# Patient Record
Sex: MALE | Race: WHITE | NOT HISPANIC OR LATINO | Employment: OTHER | URBAN - METROPOLITAN AREA
[De-identification: names, ages, dates, MRNs, and addresses within clinical notes are randomized per-mention and may not be internally consistent; named-entity substitution may affect disease eponyms.]

---

## 2019-02-04 ENCOUNTER — OFFICE VISIT (OUTPATIENT)
Dept: ORTHOPEDIC SURGERY | Facility: CLINIC | Age: 74
End: 2019-02-04

## 2019-02-04 VITALS — TEMPERATURE: 98.6 F | HEIGHT: 75 IN | BODY MASS INDEX: 30.44 KG/M2 | WEIGHT: 244.8 LBS

## 2019-02-04 DIAGNOSIS — M17.11 ARTHRITIS OF RIGHT KNEE: ICD-10-CM

## 2019-02-04 DIAGNOSIS — S89.91XA RIGHT KNEE INJURY, INITIAL ENCOUNTER: Primary | ICD-10-CM

## 2019-02-04 DIAGNOSIS — M25.561 ACUTE PAIN OF RIGHT KNEE: ICD-10-CM

## 2019-02-04 PROCEDURE — 73562 X-RAY EXAM OF KNEE 3: CPT | Performed by: ORTHOPAEDIC SURGERY

## 2019-02-04 PROCEDURE — 99203 OFFICE O/P NEW LOW 30 MIN: CPT | Performed by: ORTHOPAEDIC SURGERY

## 2019-02-04 RX ORDER — AMLODIPINE BESYLATE 5 MG/1
TABLET ORAL
COMMUNITY
Start: 2019-01-22 | End: 2019-08-14 | Stop reason: SDUPTHER

## 2019-02-04 RX ORDER — ATORVASTATIN CALCIUM 20 MG/1
TABLET, FILM COATED ORAL
COMMUNITY
Start: 2019-01-22 | End: 2019-08-14 | Stop reason: SDUPTHER

## 2019-02-04 RX ORDER — LIFITEGRAST 50 MG/ML
SOLUTION/ DROPS OPHTHALMIC
COMMUNITY
Start: 2019-01-25 | End: 2019-05-30

## 2019-02-04 NOTE — PROGRESS NOTES
Patient Name: Gary Grullon   YOB: 1945  Referring Primary Care Physician: Matthew Gomez MD  BMI: Body mass index is 31.01 kg/m².    Chief Complaint:    Chief Complaint   Patient presents with   • Right Knee - Establish Care, Pain        Subjective:    HPI:   Gary Grullon is a pleasant 73 y.o. year old who presents today for evaluation of   Chief Complaint   Patient presents with   • Right Knee - Establish Care, Pain    (Location). Duration: This has been going on for 1 week Timing: The pain is acute. Context or causative factors: unknown, playing tennis and it hurt- got sever that night interfering with his sleep.  improving.  Relieving Factors:  In the past has tried ice and advil.     This problem is new to this examiner.     Medications:   Home Medications:  Current Outpatient Medications on File Prior to Visit   Medication Sig   • amLODIPine (NORVASC) 5 MG tablet    • atorvastatin (LIPITOR) 20 MG tablet    • XIIDRA 5 % solution    • [DISCONTINUED] azithromycin (ZITHROMAX Z-TEVIN) 250 MG tablet Take 2 tablets the first day, then 1 tablet daily for 4 days.   • [DISCONTINUED] azithromycin (ZITHROMAX Z-TEVIN) 250 MG tablet Take 2 tablets the first day, then 1 tablet daily for 4 days.   • [DISCONTINUED] benzonatate (TESSALON) 200 MG capsule Take 1 capsule by mouth 3 (Three) Times a Day As Needed for Cough.   • [DISCONTINUED] benzonatate (TESSALON) 200 MG capsule Take 1 capsule by mouth 3 (Three) Times a Day As Needed for Cough.   • [DISCONTINUED] doxycycline (VIBRAMYCIN) 100 MG capsule 1 po bid   • [DISCONTINUED] tobramycin 0.3 % solution ophthalmic solution Use 2 drops in affected eye every two hours the first day then 2 drops 4 x's a day for 6 days.     No current facility-administered medications on file prior to visit.      Current Medications:  Scheduled Meds:  Continuous Infusions:  No current facility-administered medications for this visit.   PRN Meds:.    I have reviewed the patient's  medical history in detail and updated the computerized patient record.  Review and summarization of old records includes:    Past Medical History:   Diagnosis Date   • Fracture of elbow    • Fracture, finger     left; pinky   • Hyperlipidemia    • Hypertension         Past Surgical History:   Procedure Laterality Date   • CYST REMOVAL  1970        Social History     Occupational History   • Not on file   Tobacco Use   • Smoking status: Never Smoker   • Smokeless tobacco: Never Used   Substance and Sexual Activity   • Alcohol use: Yes     Comment: SOCIALLY   • Drug use: No   • Sexual activity: Defer    Social History     Social History Narrative   • Not on file        Family History   Problem Relation Age of Onset   • No Known Problems Mother    • Hypertension Father    • Diabetes Father    • Stroke Father    • Hypertension Sister    • Diabetes Sister    • Hypertension Brother    • Diabetes Brother    • No Known Problems Maternal Aunt    • No Known Problems Maternal Uncle    • No Known Problems Paternal Aunt    • No Known Problems Paternal Uncle    • No Known Problems Maternal Grandmother    • No Known Problems Maternal Grandfather    • No Known Problems Paternal Grandmother    • No Known Problems Paternal Grandfather    • Anesthesia problems Neg Hx    • Broken bones Neg Hx    • Cancer Neg Hx    • Clotting disorder Neg Hx    • Collagen disease Neg Hx    • Dislocations Neg Hx    • Osteoporosis Neg Hx    • Rheumatologic disease Neg Hx    • Scoliosis Neg Hx    • Severe sprains Neg Hx        ROS: 14 point review of systems was performed and all other systems were reviewed and are negative except for documented findings in HPI and today's encounter.     Allergies: No Known Allergies  Constitutional:  Denies fever, shaking or chills   Eyes:  Denies change in visual acuity   HENT:  Denies nasal congestion or sore throat   Respiratory:  Denies cough or shortness of breath   Cardiovascular:  Denies chest pain or severe LE  "edema   GI:  Denies abdominal pain, nausea, vomiting, bloody stools or diarrhea   Musculoskeletal:  Numbness, tingling, pain, or loss of motor function only as noted above in history of present illness.  : Denies painful urination or hematuria  Integument:  Denies rash, lesion or ulceration   Neurologic:  Denies headache or focal weakness  Endocrine:  Denies lymphadenopathy  Psych:  Denies confusion or change in mental status   Hem:  Denies active bleeding    Subjective     Objective:    Physical Exam: 73 y.o. male  Wt Readings from Last 3 Encounters:   02/04/19 111 kg (244 lb 12.8 oz)   11/11/17 103 kg (228 lb)   04/05/17 102 kg (225 lb)     Ht Readings from Last 3 Encounters:   02/04/19 189.2 cm (74.5\")   11/11/17 189.2 cm (74.5\")   04/05/17 189.2 cm (74.5\")     Body mass index is 31.01 kg/m².    Vitals:    02/04/19 0749   Temp: 98.6 °F (37 °C)       Vital signs reviewed.   General Appearance:    Alert, cooperative, in no acute distress                  Eyes: conjunctiva clear  ENT: external ears and nose atraumatic  CV: no peripheral edema  Resp: normal respiratory effort  Skin: no rashes or wounds; normal turgor  Psych: mood and affect appropriate  Lymph: no nodes appreciated  Neuro: gross sensation intact  Vascular:  Palpable peripheral pulse in noted extremity  Musculoskeletal Extremities: min joint line tenderness. mcm neg, min swelling, lig stable, crep      Radiology:   Imaging done today and discussed at length with the patient:    Indication: pain related symptoms,  Views: 3V AP, LAT & 40 degree PA right knee(s)   Findings: mod to advanced oa of the right knee best appreciated o nthe lateral  Comparison views: not available      Assessment:     ICD-10-CM ICD-9-CM   1. Right knee injury, initial encounter S89.91XA 959.7   2. Acute pain of right knee M25.561 719.46   3. Arthritis of right knee M17.11 716.96        Procedures       Plan: Biomechanics of pertinent body area discussed.  Risks, benefits, " alternatives, comparisons, and complications of accepted medicines, injections, recommendations, surgical procedures, and therapies explained and education provided in laymen's terms. The patient was given the opportunity to ask questions and they were answerved to their satisfaction.   Natural history and expected course of this patient's diagnosis discussed along with evaluation of therapies. Questions answered.  OTC analgesics as needed with dosage warning and instructions given: OTC meds: Ibuprofen  Cryotherapy/brachy therapy as indicated with instructions.   Biomechanics and proper use of ambulatory aids:  crutches, walker, cane, &/or trekking poles.      2/4/2019

## 2019-05-30 ENCOUNTER — OFFICE VISIT (OUTPATIENT)
Dept: INTERNAL MEDICINE | Facility: CLINIC | Age: 74
End: 2019-05-30

## 2019-05-30 VITALS
DIASTOLIC BLOOD PRESSURE: 76 MMHG | HEIGHT: 75 IN | BODY MASS INDEX: 30.24 KG/M2 | RESPIRATION RATE: 20 BRPM | OXYGEN SATURATION: 99 % | TEMPERATURE: 96.9 F | SYSTOLIC BLOOD PRESSURE: 118 MMHG | HEART RATE: 56 BPM | WEIGHT: 243.2 LBS

## 2019-05-30 DIAGNOSIS — E78.5 HYPERLIPIDEMIA, UNSPECIFIED HYPERLIPIDEMIA TYPE: Primary | ICD-10-CM

## 2019-05-30 DIAGNOSIS — N28.9 RENAL INSUFFICIENCY, MILD: ICD-10-CM

## 2019-05-30 DIAGNOSIS — D12.6 TUBULAR ADENOMA OF COLON: ICD-10-CM

## 2019-05-30 DIAGNOSIS — Z12.5 PROSTATE CANCER SCREENING: ICD-10-CM

## 2019-05-30 DIAGNOSIS — I10 ESSENTIAL HYPERTENSION: ICD-10-CM

## 2019-05-30 PROBLEM — S42.409A FRACTURE OF ELBOW: Status: RESOLVED | Noted: 2019-05-30 | Resolved: 2019-05-30

## 2019-05-30 PROBLEM — Z12.11 SCREENING FOR COLON CANCER: Status: ACTIVE | Noted: 2018-03-28

## 2019-05-30 PROCEDURE — 99213 OFFICE O/P EST LOW 20 MIN: CPT | Performed by: INTERNAL MEDICINE

## 2019-05-30 RX ORDER — ASPIRIN 81 MG/1
81 TABLET, CHEWABLE ORAL DAILY
COMMUNITY
End: 2020-06-18

## 2019-05-30 NOTE — PROGRESS NOTES
Subjective        Chief Complaint   Patient presents with   • Follow-up     fup  5 month fup   • Hypertension   • Hyperlipidemia       PHQ-2 Depression Screening  Little interest or pleasure in doing things? 0   Feeling down, depressed, or hopeless? 0   PHQ-2 Total Score 0         Gary Grullon is a 73 y.o. male who presents for    Patient Active Problem List   Diagnosis   • Seborrheic keratosis   • Hyperlipidemia   • Hypertension   • Tubular adenoma of colon   • Renal insufficiency, mild       History of Present Illness     He plays tennis 4 days per week. He denies chest pain, dyspnea, nausea or abdominal pain. He has been traveling some.   No Known Allergies    Current Outpatient Medications on File Prior to Visit   Medication Sig Dispense Refill   • amLODIPine (NORVASC) 5 MG tablet      • aspirin 81 MG chewable tablet Chew 81 mg Daily.     • atorvastatin (LIPITOR) 20 MG tablet      • Omega-3 Fatty Acids (FISH OIL) 1200 MG capsule delayed-release Take 1,200 mg by mouth Daily.     • TURMERIC PO Take  by mouth 2 (Two) Times a Day.     • [DISCONTINUED] XIIDRA 5 % solution        No current facility-administered medications on file prior to visit.        Past Medical History:   Diagnosis Date   • Fracture of elbow    • Hyperlipidemia    • Hypertension    • Renal insufficiency, mild        Past Surgical History:   Procedure Laterality Date   • CATARACT EXTRACTION     • COLONOSCOPY  04/2018    DR Gary Guerrero [facility]   • CYST REMOVAL  1970   • EYE SURGERY     • PILONIDAL CYST DRAINAGE         Family History   Problem Relation Age of Onset   • COPD Mother    • Hypertension Father    • Diabetes Father    • Stroke Father    • Coronary artery disease Father    • Kidney disease Sister    • Diabetes Brother    • No Known Problems Maternal Aunt    • No Known Problems Maternal Uncle    • No Known Problems Paternal Aunt    • No Known Problems Paternal Uncle    • No Known Problems Maternal Grandmother    • No Known  Problems Maternal Grandfather    • No Known Problems Paternal Grandmother    • No Known Problems Paternal Grandfather    • Anesthesia problems Neg Hx    • Broken bones Neg Hx    • Cancer Neg Hx    • Clotting disorder Neg Hx    • Collagen disease Neg Hx    • Dislocations Neg Hx    • Osteoporosis Neg Hx    • Rheumatologic disease Neg Hx    • Scoliosis Neg Hx    • Severe sprains Neg Hx        Social History     Socioeconomic History   • Marital status:      Spouse name: Not on file   • Number of children: Not on file   • Years of education: Not on file   • Highest education level: Not on file   Tobacco Use   • Smoking status: Never Smoker   • Smokeless tobacco: Never Used   Substance and Sexual Activity   • Alcohol use: Yes     Frequency: 4 or more times a week     Drinks per session: 1 or 2     Comment: 2 glasses of wine with dinner   • Drug use: No   • Sexual activity: Defer           The following portions of the patient's history were reviewed and updated as appropriate: problem list, allergies, current medications, past medical history, past family history, past social history and past surgical history.    Review of Systems   Respiratory: Negative for shortness of breath.    Cardiovascular: Negative for chest pain.       Immunization History   Administered Date(s) Administered   • Flu Vaccine High Dose PF 65YR+ 09/01/2016, 09/06/2018   • Hepatitis A 03/21/2006, 10/25/2011   • Hepatitis B 03/21/2006, 07/08/2007, 02/02/2012   • MMR 03/13/2008   • Meningococcal, Unspecified 01/14/2013   • Pneumococcal Conjugate 13-Valent (PCV13) 08/31/2016   • Pneumococcal Polysaccharide (PPSV23) 12/10/2011   • Polio, Unspecified 01/15/2003   • Tdap 01/01/2012   • Typhoid, Unspecified 01/14/2013   • Yellow Fever 01/14/2013   • Zostavax 10/19/2012       Objective   Vitals:    05/30/19 1352   BP: 118/76   Pulse: 56   Resp: 20   Temp: 96.9 °F (36.1 °C)   TempSrc: Oral   SpO2: 99%   Weight: 110 kg (243 lb 3.2 oz)   Height: 189.2  "cm (74.5\")     Physical Exam   Constitutional: He appears well-developed and well-nourished.   HENT:   Head: Normocephalic and atraumatic.   Cardiovascular: Normal rate, regular rhythm, S1 normal, S2 normal and normal heart sounds.   Pulmonary/Chest: Effort normal and breath sounds normal.   Neurological: He is alert.   Skin: Skin is warm.   Psychiatric: He has a normal mood and affect.   Vitals reviewed.      Procedures    Assessment/Plan   Gary was seen today for follow-up, hypertension and hyperlipidemia.    Diagnoses and all orders for this visit:    Hyperlipidemia, unspecified hyperlipidemia type  -     Comprehensive Metabolic Panel; Future  -     Lipid Panel With / Chol / HDL Ratio; Future    Essential hypertension    Tubular adenoma of colon    Renal insufficiency, mild    Prostate cancer screening  -     PSA Screen; Future        BP is good. Check labs next time including lipids and cr. He will need a repeat cscope in 4 years. Immunizations are UTD.         Return in about 3 months (around 8/30/2019) for Annual physical.  "

## 2019-08-14 RX ORDER — ATORVASTATIN CALCIUM 20 MG/1
TABLET, FILM COATED ORAL
Qty: 90 TABLET | Refills: 1 | Status: SHIPPED | OUTPATIENT
Start: 2019-08-14 | End: 2019-11-21 | Stop reason: SDUPTHER

## 2019-08-14 RX ORDER — AMLODIPINE BESYLATE 5 MG/1
TABLET ORAL
Qty: 90 TABLET | Refills: 1 | Status: SHIPPED | OUTPATIENT
Start: 2019-08-14 | End: 2019-11-21 | Stop reason: SDUPTHER

## 2019-08-28 ENCOUNTER — RESULTS ENCOUNTER (OUTPATIENT)
Dept: INTERNAL MEDICINE | Facility: CLINIC | Age: 74
End: 2019-08-28

## 2019-08-28 DIAGNOSIS — E78.5 HYPERLIPIDEMIA, UNSPECIFIED HYPERLIPIDEMIA TYPE: ICD-10-CM

## 2019-08-28 DIAGNOSIS — Z12.5 PROSTATE CANCER SCREENING: ICD-10-CM

## 2019-09-10 LAB
ALBUMIN SERPL-MCNC: 4.2 G/DL (ref 3.5–5.2)
ALBUMIN/GLOB SERPL: 2 G/DL
ALP SERPL-CCNC: 69 U/L (ref 39–117)
ALT SERPL-CCNC: 26 U/L (ref 1–41)
AST SERPL-CCNC: 27 U/L (ref 1–40)
BILIRUB SERPL-MCNC: 0.8 MG/DL (ref 0.2–1.2)
BUN SERPL-MCNC: 17 MG/DL (ref 8–23)
BUN/CREAT SERPL: 15.5 (ref 7–25)
CALCIUM SERPL-MCNC: 9.4 MG/DL (ref 8.6–10.5)
CHLORIDE SERPL-SCNC: 102 MMOL/L (ref 98–107)
CHOLEST SERPL-MCNC: 140 MG/DL (ref 0–200)
CHOLEST/HDLC SERPL: 2.59 {RATIO}
CO2 SERPL-SCNC: 24.5 MMOL/L (ref 22–29)
CREAT SERPL-MCNC: 1.1 MG/DL (ref 0.76–1.27)
GLOBULIN SER CALC-MCNC: 2.1 GM/DL
GLUCOSE SERPL-MCNC: 114 MG/DL (ref 65–99)
HDLC SERPL-MCNC: 54 MG/DL (ref 40–60)
LDLC SERPL CALC-MCNC: 67 MG/DL (ref 0–100)
POTASSIUM SERPL-SCNC: 4.5 MMOL/L (ref 3.5–5.2)
PROT SERPL-MCNC: 6.3 G/DL (ref 6–8.5)
PSA SERPL-MCNC: 1.52 NG/ML (ref 0–4)
SODIUM SERPL-SCNC: 141 MMOL/L (ref 136–145)
TRIGL SERPL-MCNC: 94 MG/DL (ref 0–150)
VLDLC SERPL CALC-MCNC: 18.8 MG/DL

## 2019-09-13 ENCOUNTER — OFFICE VISIT (OUTPATIENT)
Dept: INTERNAL MEDICINE | Facility: CLINIC | Age: 74
End: 2019-09-13

## 2019-09-13 VITALS
HEIGHT: 75 IN | SYSTOLIC BLOOD PRESSURE: 122 MMHG | WEIGHT: 242.2 LBS | BODY MASS INDEX: 30.11 KG/M2 | DIASTOLIC BLOOD PRESSURE: 80 MMHG

## 2019-09-13 DIAGNOSIS — E78.5 HYPERLIPIDEMIA, UNSPECIFIED HYPERLIPIDEMIA TYPE: Primary | ICD-10-CM

## 2019-09-13 DIAGNOSIS — I10 ESSENTIAL HYPERTENSION: ICD-10-CM

## 2019-09-13 PROCEDURE — 90653 IIV ADJUVANT VACCINE IM: CPT | Performed by: INTERNAL MEDICINE

## 2019-09-13 PROCEDURE — G0008 ADMIN INFLUENZA VIRUS VAC: HCPCS | Performed by: INTERNAL MEDICINE

## 2019-09-13 PROCEDURE — 99213 OFFICE O/P EST LOW 20 MIN: CPT | Performed by: INTERNAL MEDICINE

## 2019-09-13 RX ORDER — ASCORBIC ACID 500 MG
1000 TABLET ORAL DAILY
COMMUNITY

## 2019-09-13 RX ORDER — MELATONIN
1000 DAILY
COMMUNITY

## 2019-09-13 NOTE — PROGRESS NOTES
Subjective        Chief Complaint   Patient presents with   • Follow-up     6 month fup review labs    • Hyperlipidemia   • Hypertension           Gary Grullon is a 74 y.o. male who presents for    Patient Active Problem List   Diagnosis   • Seborrheic keratosis   • Hyperlipidemia   • Hypertension   • Tubular adenoma of colon   • Renal insufficiency, mild       History of Present Illness     He plays tennis 2-3 times per week. He denies chest pain, dyspnea, nausea or abdominal pain. He has not been checking his BP.   No Known Allergies    Current Outpatient Medications on File Prior to Visit   Medication Sig Dispense Refill   • amLODIPine (NORVASC) 5 MG tablet TAKE 1 TABLET DAILY 90 tablet 1   • aspirin 81 MG chewable tablet Chew 81 mg Daily.     • atorvastatin (LIPITOR) 20 MG tablet TAKE 1 TABLET DAILY 90 tablet 1   • cholecalciferol (VITAMIN D3) 1000 units tablet Take 1,000 Units by mouth Daily.     • Omega-3 Fatty Acids (FISH OIL) 1200 MG capsule delayed-release Take 1,200 mg by mouth Daily.     • TURMERIC PO Take  by mouth 2 (Two) Times a Day.     • vitamin C (ASCORBIC ACID) 500 MG tablet Take 500 mg by mouth Daily.       No current facility-administered medications on file prior to visit.        Past Medical History:   Diagnosis Date   • Fracture of elbow    • Hyperlipidemia    • Hypertension    • Renal insufficiency, mild        Past Surgical History:   Procedure Laterality Date   • CATARACT EXTRACTION     • COLONOSCOPY  04/04/2018    DR Gary Guerrero [facility]   • CYST REMOVAL  1970   • EYE SURGERY     • PILONIDAL CYST DRAINAGE         Family History   Problem Relation Age of Onset   • COPD Mother    • Hypertension Father    • Diabetes Father    • Stroke Father    • Coronary artery disease Father    • Kidney disease Sister    • Diabetes Brother    • No Known Problems Maternal Aunt    • No Known Problems Maternal Uncle    • No Known Problems Paternal Aunt    • No Known Problems Paternal Uncle    • No  Known Problems Maternal Grandmother    • No Known Problems Maternal Grandfather    • No Known Problems Paternal Grandmother    • No Known Problems Paternal Grandfather    • Anesthesia problems Neg Hx    • Broken bones Neg Hx    • Cancer Neg Hx    • Clotting disorder Neg Hx    • Collagen disease Neg Hx    • Dislocations Neg Hx    • Osteoporosis Neg Hx    • Rheumatologic disease Neg Hx    • Scoliosis Neg Hx    • Severe sprains Neg Hx        Social History     Socioeconomic History   • Marital status:      Spouse name: Not on file   • Number of children: Not on file   • Years of education: Not on file   • Highest education level: Not on file   Tobacco Use   • Smoking status: Never Smoker   • Smokeless tobacco: Never Used   Substance and Sexual Activity   • Alcohol use: Yes     Frequency: 4 or more times a week     Drinks per session: 1 or 2     Comment: 2 glasses of wine with dinner   • Drug use: No   • Sexual activity: Defer           The following portions of the patient's history were reviewed and updated as appropriate: problem list, allergies, current medications, past medical history, past family history, past social history and past surgical history.    Review of Systems   Respiratory: Negative for shortness of breath.    Cardiovascular: Negative for chest pain.       Immunization History   Administered Date(s) Administered   • Flu Vaccine High Dose PF 65YR+ 09/01/2016, 09/06/2018   • Fluad Quad 09/13/2019   • Hepatitis A 03/21/2006, 10/25/2011   • Hepatitis B 03/21/2006, 07/08/2007, 02/02/2012   • MMR 03/13/2008   • Meningococcal, Unspecified 01/14/2013   • Pneumococcal Conjugate 13-Valent (PCV13) 08/31/2016   • Pneumococcal Polysaccharide (PPSV23) 12/10/2011   • Polio, Unspecified 01/15/2003   • Shingrix 08/12/2019   • Tdap 01/01/2012   • Typhoid, Unspecified 01/14/2013   • Yellow Fever 01/14/2013   • Zostavax 10/19/2012       Objective   Vitals:    09/13/19 0826   BP: 122/80   Weight: 110 kg (242 lb  "3.2 oz)   Height: 189.2 cm (74.5\")     Physical Exam   Constitutional: He appears well-developed and well-nourished.   HENT:   Head: Normocephalic and atraumatic.   Cardiovascular: Normal rate, regular rhythm, S1 normal, S2 normal and normal heart sounds.   Pulmonary/Chest: Effort normal and breath sounds normal.   Neurological: He is alert.   Skin: Skin is warm.   Psychiatric: He has a normal mood and affect.   Vitals reviewed.      Procedures    Assessment/Plan   Gary was seen today for follow-up, hyperlipidemia and hypertension.    Diagnoses and all orders for this visit:    Hyperlipidemia, unspecified hyperlipidemia type    Essential hypertension    Other orders  -     Fluad Quad >65 years ([id number])             Labs reviewed. LDL and BP are at goal. Medicare Wellness with rectal next time.    Return in about 6 months (around 3/13/2020) for Medicare Wellness.  "

## 2019-11-12 ENCOUNTER — TELEPHONE (OUTPATIENT)
Dept: INTERNAL MEDICINE | Facility: CLINIC | Age: 74
End: 2019-11-12

## 2019-11-12 DIAGNOSIS — M25.519 SHOULDER PAIN, UNSPECIFIED CHRONICITY, UNSPECIFIED LATERALITY: Primary | ICD-10-CM

## 2019-11-12 NOTE — TELEPHONE ENCOUNTER
Gary Grullon would like a referral for a rehab facility for a shoulder impingement please.  He can be reached at [phone].  Thank you

## 2019-11-21 RX ORDER — AMLODIPINE BESYLATE 5 MG/1
5 TABLET ORAL DAILY
Qty: 90 TABLET | Refills: 1 | Status: SHIPPED | OUTPATIENT
Start: 2019-11-21 | End: 2020-06-18

## 2019-11-21 RX ORDER — ATORVASTATIN CALCIUM 20 MG/1
20 TABLET, FILM COATED ORAL DAILY
Qty: 90 TABLET | Refills: 1 | Status: SHIPPED | OUTPATIENT
Start: 2019-11-21 | End: 2020-11-23

## 2019-12-12 ENCOUNTER — TELEPHONE (OUTPATIENT)
Dept: INTERNAL MEDICINE | Facility: CLINIC | Age: 74
End: 2019-12-12

## 2019-12-13 ENCOUNTER — OFFICE VISIT (OUTPATIENT)
Dept: INTERNAL MEDICINE | Facility: CLINIC | Age: 74
End: 2019-12-13

## 2019-12-13 ENCOUNTER — LAB (OUTPATIENT)
Dept: LAB | Facility: HOSPITAL | Age: 74
End: 2019-12-13

## 2019-12-13 VITALS
DIASTOLIC BLOOD PRESSURE: 82 MMHG | WEIGHT: 240 LBS | BODY MASS INDEX: 29.84 KG/M2 | SYSTOLIC BLOOD PRESSURE: 142 MMHG | HEIGHT: 75 IN

## 2019-12-13 DIAGNOSIS — E78.5 HYPERLIPIDEMIA, UNSPECIFIED HYPERLIPIDEMIA TYPE: Primary | ICD-10-CM

## 2019-12-13 DIAGNOSIS — M25.511 ACUTE PAIN OF BOTH SHOULDERS: Primary | ICD-10-CM

## 2019-12-13 DIAGNOSIS — I10 ESSENTIAL HYPERTENSION: ICD-10-CM

## 2019-12-13 DIAGNOSIS — M25.512 ACUTE PAIN OF BOTH SHOULDERS: Primary | ICD-10-CM

## 2019-12-13 LAB
ALBUMIN SERPL-MCNC: 4.4 G/DL (ref 3.5–5.2)
ALBUMIN/GLOB SERPL: 1.6 G/DL
ALP SERPL-CCNC: 100 U/L (ref 39–117)
ALT SERPL W P-5'-P-CCNC: 26 U/L (ref 1–41)
ANION GAP SERPL CALCULATED.3IONS-SCNC: 12.7 MMOL/L (ref 5–15)
AST SERPL-CCNC: 23 U/L (ref 1–40)
BASOPHILS # BLD AUTO: 0.03 10*3/MM3 (ref 0–0.2)
BASOPHILS NFR BLD AUTO: 0.3 % (ref 0–1.5)
BILIRUB SERPL-MCNC: 0.3 MG/DL (ref 0.2–1.2)
BUN BLD-MCNC: 20 MG/DL (ref 8–23)
BUN/CREAT SERPL: 16.4 (ref 7–25)
CALCIUM SPEC-SCNC: 9.6 MG/DL (ref 8.6–10.5)
CHLORIDE SERPL-SCNC: 102 MMOL/L (ref 98–107)
CK SERPL-CCNC: 74 U/L (ref 20–200)
CO2 SERPL-SCNC: 27.3 MMOL/L (ref 22–29)
CREAT BLD-MCNC: 1.22 MG/DL (ref 0.76–1.27)
DEPRECATED RDW RBC AUTO: 40.3 FL (ref 37–54)
EOSINOPHIL # BLD AUTO: 0.37 10*3/MM3 (ref 0–0.4)
EOSINOPHIL NFR BLD AUTO: 3.8 % (ref 0.3–6.2)
ERYTHROCYTE [DISTWIDTH] IN BLOOD BY AUTOMATED COUNT: 12.2 % (ref 12.3–15.4)
ERYTHROCYTE [SEDIMENTATION RATE] IN BLOOD: 32 MM/HR (ref 0–20)
GFR SERPL CREATININE-BSD FRML MDRD: 58 ML/MIN/1.73
GLOBULIN UR ELPH-MCNC: 2.7 GM/DL
GLUCOSE BLD-MCNC: 92 MG/DL (ref 65–99)
HCT VFR BLD AUTO: 40.9 % (ref 37.5–51)
HGB BLD-MCNC: 14 G/DL (ref 13–17.7)
IMM GRANULOCYTES # BLD AUTO: 0.04 10*3/MM3 (ref 0–0.05)
IMM GRANULOCYTES NFR BLD AUTO: 0.4 % (ref 0–0.5)
LYMPHOCYTES # BLD AUTO: 2 10*3/MM3 (ref 0.7–3.1)
LYMPHOCYTES NFR BLD AUTO: 20.5 % (ref 19.6–45.3)
MCH RBC QN AUTO: 31.3 PG (ref 26.6–33)
MCHC RBC AUTO-ENTMCNC: 34.2 G/DL (ref 31.5–35.7)
MCV RBC AUTO: 91.3 FL (ref 79–97)
MONOCYTES # BLD AUTO: 1 10*3/MM3 (ref 0.1–0.9)
MONOCYTES NFR BLD AUTO: 10.2 % (ref 5–12)
NEUTROPHILS # BLD AUTO: 6.32 10*3/MM3 (ref 1.7–7)
NEUTROPHILS NFR BLD AUTO: 64.8 % (ref 42.7–76)
NRBC BLD AUTO-RTO: 0 /100 WBC (ref 0–0.2)
PLATELET # BLD AUTO: 293 10*3/MM3 (ref 140–450)
PMV BLD AUTO: 10.6 FL (ref 6–12)
POTASSIUM BLD-SCNC: 4.4 MMOL/L (ref 3.5–5.2)
PROT SERPL-MCNC: 7.1 G/DL (ref 6–8.5)
RBC # BLD AUTO: 4.48 10*6/MM3 (ref 4.14–5.8)
SODIUM BLD-SCNC: 142 MMOL/L (ref 136–145)
WBC NRBC COR # BLD: 9.76 10*3/MM3 (ref 3.4–10.8)

## 2019-12-13 PROCEDURE — 80053 COMPREHEN METABOLIC PANEL: CPT

## 2019-12-13 PROCEDURE — 99213 OFFICE O/P EST LOW 20 MIN: CPT | Performed by: INTERNAL MEDICINE

## 2019-12-13 PROCEDURE — 85025 COMPLETE CBC W/AUTO DIFF WBC: CPT

## 2019-12-13 PROCEDURE — 82550 ASSAY OF CK (CPK): CPT

## 2019-12-13 PROCEDURE — 85652 RBC SED RATE AUTOMATED: CPT

## 2019-12-13 RX ORDER — DICLOFENAC SODIUM 75 MG/1
TABLET, DELAYED RELEASE ORAL
COMMUNITY
Start: 2019-10-23 | End: 2020-01-13

## 2019-12-13 NOTE — PROGRESS NOTES
Subjective        Chief Complaint   Patient presents with   • Shoulder Pain     bilateral shoulder pain x1 month c/o not sleeping pain            Dinojason Grullon is a 74 y.o. male who presents for    Patient Active Problem List   Diagnosis   • Seborrheic keratosis   • Hyperlipidemia   • Hypertension   • Tubular adenoma of colon   • Renal insufficiency, mild   • Bilateral shoulder pain       History of Present Illness     He has pain in both of his shoulders without any inciting events. He did PT and it did not help. He went to see Dr. Rdz and he had xrays. He had cortisone shots in both shoulders and it may have helped for a few days. Tonight he is getting MRIs of both shoulders. It makes it hard for him to sleep at night. He has pain in his legs as well. The pain is not as much after he gets to walking. He denies fever, chills or red swollen joints.   No Known Allergies    Current Outpatient Medications on File Prior to Visit   Medication Sig Dispense Refill   • amLODIPine (NORVASC) 5 MG tablet Take 1 tablet by mouth Daily. 90 tablet 1   • aspirin 81 MG chewable tablet Chew 81 mg Daily.     • atorvastatin (LIPITOR) 20 MG tablet Take 1 tablet by mouth Daily. 90 tablet 1   • cholecalciferol (VITAMIN D3) 1000 units tablet Take 1,000 Units by mouth Daily.     • diclofenac (VOLTAREN) 75 MG EC tablet      • Omega-3 Fatty Acids (FISH OIL) 1200 MG capsule delayed-release Take 1,200 mg by mouth Daily.     • TURMERIC PO Take  by mouth 2 (Two) Times a Day.     • vitamin C (ASCORBIC ACID) 500 MG tablet Take 500 mg by mouth Daily.       No current facility-administered medications on file prior to visit.        Past Medical History:   Diagnosis Date   • Fracture of elbow    • Hyperlipidemia    • Hypertension    • Renal insufficiency, mild        Past Surgical History:   Procedure Laterality Date   • CATARACT EXTRACTION     • [facility]  [facility]04/2018    [facility]   • CYST REMOVAL  1970   • EYE SURGERY      • PILONIDAL CYST DRAINAGE         Family History   Problem Relation Age of Onset   • COPD Mother    • Hypertension Father    • Diabetes Father    • Stroke Father    • Coronary artery disease Father    • Kidney disease Sister    • Diabetes Brother    • No Known Problems Maternal Aunt    • No Known Problems Maternal Uncle    • No Known Problems Paternal Aunt    • No Known Problems Paternal Uncle    • No Known Problems Maternal Grandmother    • No Known Problems Maternal Grandfather    • No Known Problems Paternal Grandmother    • No Known Problems Paternal Grandfather    • Anesthesia problems Neg Hx    • Broken bones Neg Hx    • Cancer Neg Hx    • Clotting disorder Neg Hx    • Collagen disease Neg Hx    • Dislocations Neg Hx    • Osteoporosis Neg Hx    • Rheumatologic disease Neg Hx    • Scoliosis Neg Hx    • Severe sprains Neg Hx        Social History     Socioeconomic History   • Marital status:      Spouse name: Not on file   • Number of children: Not on file   • Years of education: Not on file   • Highest education level: Not on file   Tobacco Use   • Smoking status: Never Smoker   • Smokeless tobacco: Never Used   Substance and Sexual Activity   • Alcohol use: Yes     Frequency: 4 or more times a week     Drinks per session: 1 or 2     Comment: 2 glasses of wine with dinner   • Drug use: No   • Sexual activity: Defer           The following portions of the patient's history were reviewed and updated as appropriate: problem list, allergies, current medications, past medical history, past family history, past social history and past surgical history.    Review of Systems   Musculoskeletal: Positive for myalgias.       Immunization History   Administered Date(s) Administered   • Fluad Quad 09/13/2019   • Fluzone High Dose =>65 Years ([employer] ONLY) 09/01/2016, 09/06/2018   • Hepatitis A 03/21/2006, 10/25/2011   • Hepatitis B 03/21/2006, 07/08/2007, 02/02/2012   • MMR 03/13/2008   • Meningococcal, Unspecified  "01/14/2013   • Pneumococcal Conjugate 13-Valent (PCV13) 08/31/2016   • Pneumococcal Polysaccharide (PPSV23) 12/10/2011   • Polio, Unspecified 01/15/2003   • Shingrix 08/12/2019   • Tdap 01/01/2012, 11/02/2019   • Typhoid, Unspecified 01/14/2013   • Yellow Fever 01/14/2013   • Zostavax 10/19/2012, 08/12/2019       Objective   Vitals:    12/13/19 1705   BP: 142/82   Weight: 109 kg (240 lb)   Height: 189.2 cm (74.5\")     Body mass index is 30.4 kg/m².  Physical Exam   Constitutional: He appears well-developed and well-nourished.   HENT:   Head: Normocephalic and atraumatic.   Cardiovascular: Normal rate, regular rhythm, S1 normal, S2 normal and normal heart sounds.   Pulmonary/Chest: Effort normal and breath sounds normal.   Musculoskeletal:   5/5 strength except 4+/5 with abduction of shoulders    Arms and legs non tender but he does have pain in shoulders with taking off shirt   Neurological: He is alert.   Skin: Skin is warm.   Psychiatric: He has a normal mood and affect.   Vitals reviewed.      Procedures    Assessment/Plan   Edward was seen today for shoulder pain.    Diagnoses and all orders for this visit:    Acute pain of both shoulders  -     CK  -     Sedimentation rate, automated  -     CBC & Differential  -     Comprehensive Metabolic Panel  -     CBC Auto Differential               Hold statin over weekend. Check CPK and ESR to eval for muscle disease and PMR respectively.  No follow-ups on file.  "

## 2019-12-16 ENCOUNTER — TELEPHONE (OUTPATIENT)
Dept: INTERNAL MEDICINE | Facility: CLINIC | Age: 74
End: 2019-12-16

## 2020-01-06 ENCOUNTER — TELEPHONE (OUTPATIENT)
Dept: INTERNAL MEDICINE | Facility: CLINIC | Age: 75
End: 2020-01-06

## 2020-01-06 NOTE — TELEPHONE ENCOUNTER
Ortho should be managing all of  His shoulder  pain. That is what he is seeing them for. They are the ones to prescribe any pain meds. We never do it when being seen by someone else

## 2020-01-06 NOTE — TELEPHONE ENCOUNTER
Spoke with pt informed we recd the letter he sent me via e-mail he decided to call ortho  And has appt on Wenesday

## 2020-01-06 NOTE — TELEPHONE ENCOUNTER
Pt calling back again on shoulder pain and not sleeping has had issues with this and has saw ortho and had injections still in a lot of pain please advise

## 2020-01-09 DIAGNOSIS — M25.511 ACUTE PAIN OF BOTH SHOULDERS: Primary | ICD-10-CM

## 2020-01-09 DIAGNOSIS — M25.512 ACUTE PAIN OF BOTH SHOULDERS: Primary | ICD-10-CM

## 2020-01-13 ENCOUNTER — TELEPHONE (OUTPATIENT)
Dept: INTERNAL MEDICINE | Facility: CLINIC | Age: 75
End: 2020-01-13

## 2020-01-13 ENCOUNTER — OFFICE VISIT (OUTPATIENT)
Dept: INTERNAL MEDICINE | Facility: CLINIC | Age: 75
End: 2020-01-13

## 2020-01-13 VITALS
BODY MASS INDEX: 29.34 KG/M2 | DIASTOLIC BLOOD PRESSURE: 86 MMHG | HEIGHT: 75 IN | SYSTOLIC BLOOD PRESSURE: 122 MMHG | WEIGHT: 236 LBS

## 2020-01-13 DIAGNOSIS — M25.511 ACUTE PAIN OF BOTH SHOULDERS: Primary | ICD-10-CM

## 2020-01-13 DIAGNOSIS — M25.512 ACUTE PAIN OF BOTH SHOULDERS: Primary | ICD-10-CM

## 2020-01-13 PROCEDURE — 99213 OFFICE O/P EST LOW 20 MIN: CPT | Performed by: INTERNAL MEDICINE

## 2020-01-13 RX ORDER — MELOXICAM 7.5 MG/1
7.5 TABLET ORAL DAILY
Qty: 30 TABLET | Refills: 0 | Status: SHIPPED | OUTPATIENT
Start: 2020-01-13 | End: 2020-03-12

## 2020-01-13 NOTE — PROGRESS NOTES
Subjective  Answers for HPI/ROS submitted by the patient on 1/12/2020   How long have you been having these symptoms?: 1-4 weeks ago         Chief Complaint   Patient presents with   • Shoulder Pain     bilateral shoulder pain review labs mri from ortho            Dinojason Grullon is a 74 y.o. male who presents for    Patient Active Problem List   Diagnosis   • Seborrheic keratosis   • Hyperlipidemia   • Hypertension   • Tubular adenoma of colon   • Renal insufficiency, mild   • Bilateral shoulder pain       History of Present Illness     He had both shoulders injected again. He is 75 percent better. The pain is better; he does ache. He had an MRI of his right shoulder with arthrosis and tendinosis and bursitis and tendinosis on the left with capsulitis. He has been doing PT as well. He has no red hot swollen joints. He has no headaches. He is some weaker with the pain. He is not sure if his pain is any different off the Lipitor. He was having a lot of pain in his shoulders at night but that has improved. He has no pain in his jaws with chewing. He has no hip pain. He can be stiff for less than 10 minutes when he gets up.  No Known Allergies    Current Outpatient Medications on File Prior to Visit   Medication Sig Dispense Refill   • amLODIPine (NORVASC) 5 MG tablet Take 1 tablet by mouth Daily. 90 tablet 1   • aspirin 81 MG chewable tablet Chew 81 mg Daily.     • cholecalciferol (VITAMIN D3) 1000 units tablet Take 1,000 Units by mouth Daily.     • Omega-3 Fatty Acids (FISH OIL) 1200 MG capsule delayed-release Take 1,200 mg by mouth Daily.     • TURMERIC PO Take  by mouth 2 (Two) Times a Day.     • vitamin C (ASCORBIC ACID) 500 MG tablet Take 500 mg by mouth Daily.     • atorvastatin (LIPITOR) 20 MG tablet Take 1 tablet by mouth Daily. 90 tablet 1   • [DISCONTINUED] diclofenac (VOLTAREN) 75 MG EC tablet        No current facility-administered medications on file prior to visit.        Past Medical History:   Diagnosis  Date   • Fracture of elbow    • Hyperlipidemia    • Hypertension    • Renal insufficiency, mild        Past Surgical History:   Procedure Laterality Date   • CATARACT EXTRACTION     • COLONOSCOPY  04/04/2018    DR Gary Guerrero [facility]   • CYST REMOVAL  1970   • EYE SURGERY     • PILONIDAL CYST DRAINAGE         Family History   Problem Relation Age of Onset   • COPD Mother    • Hypertension Father    • Diabetes Father    • Stroke Father    • Coronary artery disease Father    • Kidney disease Sister    • Diabetes Brother    • No Known Problems Maternal Aunt    • No Known Problems Maternal Uncle    • No Known Problems Paternal Aunt    • No Known Problems Paternal Uncle    • No Known Problems Maternal Grandmother    • No Known Problems Maternal Grandfather    • No Known Problems Paternal Grandmother    • No Known Problems Paternal Grandfather    • Anesthesia problems Neg Hx    • Broken bones Neg Hx    • Cancer Neg Hx    • Clotting disorder Neg Hx    • Collagen disease Neg Hx    • Dislocations Neg Hx    • Osteoporosis Neg Hx    • Rheumatologic disease Neg Hx    • Scoliosis Neg Hx    • Severe sprains Neg Hx        Social History     Socioeconomic History   • Marital status:      Spouse name: Not on file   • Number of children: Not on file   • Years of education: Not on file   • Highest education level: Not on file   Tobacco Use   • Smoking status: Never Smoker   • Smokeless tobacco: Never Used   Substance and Sexual Activity   • Alcohol use: Yes     Frequency: 4 or more times a week     Drinks per session: 1 or 2     Comment: 2 glasses of wine with dinner   • Drug use: No   • Sexual activity: Defer           The following portions of the patient's history were reviewed and updated as appropriate:  .    Review of Systems   Constitutional: Negative for fever.   Musculoskeletal:        Bilateral shoulder pain       Immunization History   Administered Date(s) Administered   • Fluad Quad 09/13/2019   • Fluzone  "High Dose =>65 Years (Vaxcare ONLY) 09/01/2016, 09/06/2018   • Hepatitis A 03/21/2006, 10/25/2011   • Hepatitis B 03/21/2006, 07/08/2007, 02/02/2012   • MMR 03/13/2008   • Meningococcal, Unspecified 01/14/2013   • Pneumococcal Conjugate 13-Valent (PCV13) 08/31/2016   • Pneumococcal Polysaccharide (PPSV23) 12/10/2011   • Polio, Unspecified 01/15/2003   • Shingrix 08/12/2019   • Tdap 01/01/2012, 11/02/2019   • Typhoid, Unspecified 01/14/2013   • Yellow Fever 01/14/2013   • Zostavax 10/19/2012, 08/12/2019       Objective   Vitals:    01/13/20 1400   BP: 122/86   Weight: 107 kg (236 lb)   Height: 189.2 cm (74.5\")     Body mass index is 29.9 kg/m².  Physical Exam   Constitutional: He appears well-developed and well-nourished.   HENT:   Head: Normocephalic and atraumatic.   Cardiovascular: Normal rate, regular rhythm, S1 normal, S2 normal and normal heart sounds.   Pulmonary/Chest: Effort normal and breath sounds normal.   Musculoskeletal:   Bilateral FROM of his shoulders. He has no crepitus. He has no weakness. 5/5 strength in his arms.   Neurological: He is alert.   Skin: Skin is warm.   Psychiatric: He has a normal mood and affect.   Vitals reviewed.      Procedures    Assessment/Plan   Gary was seen today for shoulder pain.    Diagnoses and all orders for this visit:    Acute pain of both shoulders  -     meloxicam (MOBIC) 7.5 MG tablet; Take 1 tablet by mouth Daily.             Reviewed labs, Dr. Rdz's note, and his MRIs. ESR and CRP barely increased. TRAVON is pending. Send in Meloxicam. He will call if his pain goes back to where it was prior to his injections and then I would do a short burst of prednisone to see if PMR.    No follow-ups on file.  Answers for HPI/ROS submitted by the patient on 1/12/2020   How long have you been having these symptoms?: 1-4 weeks ago    "

## 2020-01-14 LAB
ALBUMIN SERPL-MCNC: 4.1 G/DL (ref 3.5–5.2)
ALBUMIN/GLOB SERPL: 1.6 G/DL
ALP SERPL-CCNC: 77 U/L (ref 39–117)
ALT SERPL-CCNC: 20 U/L (ref 1–41)
ANA TITR SER IF: POSITIVE {TITER}
AST SERPL-CCNC: 22 U/L (ref 1–40)
BASOPHILS # BLD AUTO: 0.06 10*3/MM3 (ref 0–0.2)
BASOPHILS NFR BLD AUTO: 0.7 % (ref 0–1.5)
BILIRUB SERPL-MCNC: 0.4 MG/DL (ref 0.2–1.2)
BUN SERPL-MCNC: 27 MG/DL (ref 8–23)
BUN/CREAT SERPL: 25.2 (ref 7–25)
CALCIUM SERPL-MCNC: 9.6 MG/DL (ref 8.6–10.5)
CCP IGA+IGG SERPL IA-ACNC: 10 UNITS (ref 0–19)
CENTROMERE AB TITR SER IF: ABNORMAL {TITER}
CENTROMERE B AB SER-ACNC: <0.2 AI (ref 0–0.9)
CHLORIDE SERPL-SCNC: 102 MMOL/L (ref 98–107)
CHROMATIN AB SERPL-ACNC: <0.2 AI (ref 0–0.9)
CK SERPL-CCNC: 96 U/L (ref 20–200)
CO2 SERPL-SCNC: 24.8 MMOL/L (ref 22–29)
CREAT SERPL-MCNC: 1.07 MG/DL (ref 0.76–1.27)
CRP SERPL-MCNC: 0.86 MG/DL (ref 0–0.5)
DSDNA AB SER-ACNC: <1 IU/ML (ref 0–9)
ENA JO1 AB SER-ACNC: <0.2 AI (ref 0–0.9)
ENA RNP AB SER-ACNC: 0.5 AI (ref 0–0.9)
ENA SCL70 AB SER-ACNC: <0.2 AI (ref 0–0.9)
ENA SM AB SER-ACNC: <0.2 AI (ref 0–0.9)
ENA SS-A AB SER-ACNC: <0.2 AI (ref 0–0.9)
ENA SS-B AB SER-ACNC: <0.2 AI (ref 0–0.9)
EOSINOPHIL # BLD AUTO: 0.09 10*3/MM3 (ref 0–0.4)
EOSINOPHIL NFR BLD AUTO: 1.1 % (ref 0.3–6.2)
ERYTHROCYTE [DISTWIDTH] IN BLOOD BY AUTOMATED COUNT: 12.4 % (ref 12.3–15.4)
ERYTHROCYTE [SEDIMENTATION RATE] IN BLOOD BY WESTERGREN METHOD: 36 MM/HR (ref 0–20)
GLOBULIN SER CALC-MCNC: 2.6 GM/DL
GLUCOSE SERPL-MCNC: 107 MG/DL (ref 65–99)
HCT VFR BLD AUTO: 40.2 % (ref 37.5–51)
HGB BLD-MCNC: 13.6 G/DL (ref 13–17.7)
IMM GRANULOCYTES # BLD AUTO: 0.03 10*3/MM3 (ref 0–0.05)
IMM GRANULOCYTES NFR BLD AUTO: 0.4 % (ref 0–0.5)
LABORATORY COMMENT REPORT: ABNORMAL
LYMPHOCYTES # BLD AUTO: 2.34 10*3/MM3 (ref 0.7–3.1)
LYMPHOCYTES NFR BLD AUTO: 28.5 % (ref 19.6–45.3)
Lab: ABNORMAL
Lab: ABNORMAL
MCH RBC QN AUTO: 30.9 PG (ref 26.6–33)
MCHC RBC AUTO-ENTMCNC: 33.8 G/DL (ref 31.5–35.7)
MCV RBC AUTO: 91.4 FL (ref 79–97)
MONOCYTES # BLD AUTO: 0.74 10*3/MM3 (ref 0.1–0.9)
MONOCYTES NFR BLD AUTO: 9 % (ref 5–12)
NEUTROPHILS # BLD AUTO: 4.95 10*3/MM3 (ref 1.7–7)
NEUTROPHILS NFR BLD AUTO: 60.3 % (ref 42.7–76)
NRBC BLD AUTO-RTO: 0 /100 WBC (ref 0–0.2)
PLATELET # BLD AUTO: 288 10*3/MM3 (ref 140–450)
POTASSIUM SERPL-SCNC: 4.3 MMOL/L (ref 3.5–5.2)
PROT SERPL-MCNC: 6.7 G/DL (ref 6–8.5)
RBC # BLD AUTO: 4.4 10*6/MM3 (ref 4.14–5.8)
RHEUMATOID FACT SERPL-ACNC: <10 IU/ML (ref 0–13.9)
SODIUM SERPL-SCNC: 141 MMOL/L (ref 136–145)
WBC # BLD AUTO: 8.21 10*3/MM3 (ref 3.4–10.8)

## 2020-01-15 DIAGNOSIS — M25.511 BILATERAL SHOULDER PAIN, UNSPECIFIED CHRONICITY: ICD-10-CM

## 2020-01-15 DIAGNOSIS — M25.512 BILATERAL SHOULDER PAIN, UNSPECIFIED CHRONICITY: ICD-10-CM

## 2020-01-15 DIAGNOSIS — R76.8 POSITIVE ANA (ANTINUCLEAR ANTIBODY): Primary | ICD-10-CM

## 2020-01-20 ENCOUNTER — TELEPHONE (OUTPATIENT)
Dept: INTERNAL MEDICINE | Facility: CLINIC | Age: 75
End: 2020-01-20

## 2020-01-20 NOTE — TELEPHONE ENCOUNTER
Pt calling states he recd a call from rheumatology and wanted to know why Dr NASEEM thacker referred him states he does not recall at last visit talking about this

## 2020-02-26 DIAGNOSIS — Z00.00 WELLNESS EXAMINATION: ICD-10-CM

## 2020-02-26 DIAGNOSIS — I10 ESSENTIAL HYPERTENSION: Primary | ICD-10-CM

## 2020-02-26 DIAGNOSIS — E78.5 HYPERLIPIDEMIA, UNSPECIFIED HYPERLIPIDEMIA TYPE: ICD-10-CM

## 2020-03-05 LAB
ALBUMIN SERPL-MCNC: 3.9 G/DL (ref 3.5–5.2)
ALBUMIN/GLOB SERPL: 1.8 G/DL
ALP SERPL-CCNC: 59 U/L (ref 39–117)
ALT SERPL-CCNC: 16 U/L (ref 1–41)
AST SERPL-CCNC: 13 U/L (ref 1–40)
BILIRUB SERPL-MCNC: 0.7 MG/DL (ref 0.2–1.2)
BUN SERPL-MCNC: 15 MG/DL (ref 8–23)
BUN/CREAT SERPL: 12.7 (ref 7–25)
CALCIUM SERPL-MCNC: 9.2 MG/DL (ref 8.6–10.5)
CHLORIDE SERPL-SCNC: 102 MMOL/L (ref 98–107)
CHOLEST SERPL-MCNC: 226 MG/DL (ref 0–200)
CHOLEST/HDLC SERPL: 2.66 {RATIO}
CO2 SERPL-SCNC: 23.9 MMOL/L (ref 22–29)
CREAT SERPL-MCNC: 1.18 MG/DL (ref 0.76–1.27)
GLOBULIN SER CALC-MCNC: 2.2 GM/DL
GLUCOSE SERPL-MCNC: 108 MG/DL (ref 65–99)
HDLC SERPL-MCNC: 85 MG/DL (ref 40–60)
LDLC SERPL CALC-MCNC: 116 MG/DL (ref 0–100)
POTASSIUM SERPL-SCNC: 4.2 MMOL/L (ref 3.5–5.2)
PROT SERPL-MCNC: 6.1 G/DL (ref 6–8.5)
SODIUM SERPL-SCNC: 139 MMOL/L (ref 136–145)
TRIGL SERPL-MCNC: 124 MG/DL (ref 0–150)
VLDLC SERPL CALC-MCNC: 24.8 MG/DL

## 2020-03-12 ENCOUNTER — OFFICE VISIT (OUTPATIENT)
Dept: INTERNAL MEDICINE | Facility: CLINIC | Age: 75
End: 2020-03-12

## 2020-03-12 VITALS
DIASTOLIC BLOOD PRESSURE: 78 MMHG | SYSTOLIC BLOOD PRESSURE: 122 MMHG | BODY MASS INDEX: 29.37 KG/M2 | HEIGHT: 75 IN | WEIGHT: 236.2 LBS

## 2020-03-12 DIAGNOSIS — E78.49 OTHER HYPERLIPIDEMIA: ICD-10-CM

## 2020-03-12 DIAGNOSIS — I10 ESSENTIAL HYPERTENSION: ICD-10-CM

## 2020-03-12 DIAGNOSIS — Z00.00 MEDICARE ANNUAL WELLNESS VISIT, SUBSEQUENT: Primary | ICD-10-CM

## 2020-03-12 DIAGNOSIS — M35.3 PMR (POLYMYALGIA RHEUMATICA) (HCC): ICD-10-CM

## 2020-03-12 PROCEDURE — G0439 PPPS, SUBSEQ VISIT: HCPCS | Performed by: INTERNAL MEDICINE

## 2020-03-12 RX ORDER — PREDNISONE 1 MG/1
TABLET ORAL DAILY
COMMUNITY
Start: 2020-02-26 | End: 2021-06-15

## 2020-03-12 NOTE — PROGRESS NOTES
The ABCs of the Annual Wellness Visit  Subsequent Medicare Wellness Visit    Chief Complaint   Patient presents with   • Medicare Wellness-subsequent     yearly exam lab review        Subjective   History of Present Illness:  Gary Grullon is a 74 y.o. male who presents for a Subsequent Medicare Wellness Visit.  He is on oral steroids for PMR. His pain resolved in his shoulders immediately. He is being weaned to 15 mg of prednisone. He is back to playing tennis. He denies chest pain or dyspnea. He has been checking his BP and it has been 115/80.  HEALTH RISK ASSESSMENT    Recent Hospitalizations:  No hospitalization(s) within the last year.    Current Medical Providers:  Patient Care Team:  Rommel Wilkerson MD as PCP - General (Internal Medicine)  Rommel Wilkerson MD as PCP - Claims Attributed    Smoking Status:  Social History     Tobacco Use   Smoking Status Never Smoker   Smokeless Tobacco Never Used       Alcohol Consumption:  Social History     Substance and Sexual Activity   Alcohol Use Yes   • Frequency: 4 or more times a week   • Drinks per session: 1 or 2    Comment: 2 glasses of wine with dinner       Depression Screen:   PHQ-2/PHQ-9 Depression Screening 3/12/2020   Little interest or pleasure in doing things 0   Feeling down, depressed, or hopeless 0   Trouble falling or staying asleep, or sleeping too much 0   Feeling tired or having little energy 0   Poor appetite or overeating 0   Feeling bad about yourself - or that you are a failure or have let yourself or your family down 0   Trouble concentrating on things, such as reading the newspaper or watching television 0   Moving or speaking so slowly that other people could have noticed. Or the opposite - being so fidgety or restless that you have been moving around a lot more than usual 0   Thoughts that you would be better off dead, or of hurting yourself in some way 0   Total Score 0       Fall Risk Screen:  LAURA Fall Risk Assessment was  completed, and patient is at LOW risk for falls.Assessment completed on:1/13/2020    Health Habits and Functional and Cognitive Screening:  Functional & Cognitive Status 3/12/2020   Do you have difficulty preparing food and eating? No   Do you have difficulty bathing yourself, getting dressed or grooming yourself? No   Do you have difficulty using the toilet? No   Do you have difficulty moving around from place to place? No   Do you have trouble with steps or getting out of a bed or a chair? No   Current Diet Well Balanced Diet   Dental Exam Up to date   Eye Exam Up to date   Exercise (times per week) 3 times per week   Current Exercise Activities Include Walking   Do you need help using the phone?  No   Are you deaf or do you have serious difficulty hearing?  No   Do you need help with transportation? No   Do you need help shopping? No   Do you need help preparing meals?  No   Do you need help with housework?  No   Do you need help with laundry? No   Do you need help taking your medications? No   Do you need help managing money? No   Do you ever drive or ride in a car without wearing a seat belt? No   Have you felt unusual stress, anger or loneliness in the last month? No   Who do you live with? Spouse   If you need help, do you have trouble finding someone available to you? No   Have you been bothered in the last four weeks by sexual problems? No   Do you have difficulty concentrating, remembering or making decisions? No         Does the patient have evidence of cognitive impairment? No    Asprin use counseling:Does not need ASA (and currently is not on it)    Age-appropriate Screening Schedule:  Refer to the list below for future screening recommendations based on patient's age, sex and/or medical conditions. Orders for these recommended tests are listed in the plan section. The patient has been provided with a written plan.    Health Maintenance   Topic Date Due   • ZOSTER VACCINE (3 of 3) 10/07/2019   • LIPID  PANEL  03/05/2021   • COLONOSCOPY  04/04/2028   • TDAP/TD VACCINES (3 - Td) 11/02/2029   • INFLUENZA VACCINE  Completed          The following portions of the patient's history were reviewed and updated as appropriate: allergies, current medications, past family history, past medical history, past social history, past surgical history and problem list.    Outpatient Medications Prior to Visit   Medication Sig Dispense Refill   • amLODIPine (NORVASC) 5 MG tablet Take 1 tablet by mouth Daily. 90 tablet 1   • aspirin 81 MG chewable tablet Chew 81 mg Daily.     • cholecalciferol (VITAMIN D3) 1000 units tablet Take 1,000 Units by mouth Daily.     • Omega-3 Fatty Acids (FISH OIL) 1200 MG capsule delayed-release Take 1,200 mg by mouth Daily.     • predniSONE (DELTASONE) 5 MG tablet      • vitamin C (ASCORBIC ACID) 500 MG tablet Take 500 mg by mouth Daily.     • atorvastatin (LIPITOR) 20 MG tablet Take 1 tablet by mouth Daily. 90 tablet 1   • TURMERIC PO Take  by mouth 2 (Two) Times a Day.     • meloxicam (MOBIC) 7.5 MG tablet Take 1 tablet by mouth Daily. 30 tablet 0     No facility-administered medications prior to visit.        Patient Active Problem List   Diagnosis   • Seborrheic keratosis   • Hyperlipidemia   • Hypertension   • Tubular adenoma of colon   • Renal insufficiency, mild   • Medicare annual wellness visit, subsequent   • PMR (polymyalgia rheumatica) (CMS/[facility])       Advanced Care Planning:  ACP discussion was held with the patient during this visit. Patient has an advance directive (not in EMR), copy requested.    Review of Systems   Respiratory: Negative for shortness of breath.    Cardiovascular: Negative for chest pain.       Compared to one year ago, the patient feels his physical health is the same.  Compared to one year ago, the patient feels his mental health is the same.    Reviewed chart for potential of high risk medication in the elderly: yes  Reviewed chart for potential of harmful drug  "interactions in the elderly:yes    Objective         Vitals:    03/12/20 0821   BP: 122/78   Weight: 107 kg (236 lb 3.2 oz)   Height: 189.2 cm (74.5\")   PainSc: 0-No pain       Body mass index is 29.92 kg/m².  Discussed the patient's BMI with him. The BMI is above average; BMI management plan is completed.    Physical Exam   Constitutional: He appears well-developed and well-nourished.   Neck: Carotid bruit is not present.   Cardiovascular: Normal rate, regular rhythm and normal heart sounds.   Pulmonary/Chest: Effort normal and breath sounds normal.   Genitourinary: Rectum normal and prostate normal.   Nursing note and vitals reviewed.      Lab Results   Component Value Date     (H) 03/05/2020    CHLPL 226 (H) 03/05/2020    TRIG 124 03/05/2020    HDL 85 (H) 03/05/2020     (H) 03/05/2020    VLDL 24.8 03/05/2020        Assessment/Plan   Medicare Risks and Personalized Health Plan  CMS Preventative Services Quick Reference  Advance Directive Discussion    The above risks/problems have been discussed with the patient.  Pertinent information has been shared with the patient in the After Visit Summary.  Follow up plans and orders are seen below in the Assessment/Plan Section.    Diagnoses and all orders for this visit:    1. Medicare annual wellness visit, subsequent (Primary)    2. Essential hypertension    3. Other hyperlipidemia  -     Comprehensive Metabolic Panel; Future  -     Lipid Panel With / Chol / HDL Ratio; Future    4. PMR (polymyalgia rheumatica) (CMS/[facility])      Follow Up:  Return in about 3 months (around 6/12/2020).     An After Visit Summary and PPPS were given to the patient.       Reviewed labs. He will r/s his Lipitor as his shoulder pain appears to be related to PMR. Asked for advanced directive. Discussed exercising 150 minutes per week.  BP is good.    Answers for HPI/ROS submitted by the patient on 3/10/2020   What is the primary reason for your visit?: Physical    "

## 2020-06-10 ENCOUNTER — RESULTS ENCOUNTER (OUTPATIENT)
Dept: INTERNAL MEDICINE | Facility: CLINIC | Age: 75
End: 2020-06-10

## 2020-06-10 DIAGNOSIS — E78.49 OTHER HYPERLIPIDEMIA: ICD-10-CM

## 2020-06-16 LAB
ALBUMIN SERPL-MCNC: 4.1 G/DL (ref 3.5–5.2)
ALBUMIN/GLOB SERPL: 2.1 G/DL
ALP SERPL-CCNC: 55 U/L (ref 39–117)
ALT SERPL-CCNC: 19 U/L (ref 1–41)
AST SERPL-CCNC: 17 U/L (ref 1–40)
BILIRUB SERPL-MCNC: 0.8 MG/DL (ref 0.2–1.2)
BUN SERPL-MCNC: 21 MG/DL (ref 8–23)
BUN/CREAT SERPL: 16.9 (ref 7–25)
CALCIUM SERPL-MCNC: 8.9 MG/DL (ref 8.6–10.5)
CHLORIDE SERPL-SCNC: 105 MMOL/L (ref 98–107)
CHOLEST SERPL-MCNC: 174 MG/DL (ref 0–200)
CHOLEST/HDLC SERPL: 2.35 {RATIO}
CO2 SERPL-SCNC: 23.3 MMOL/L (ref 22–29)
CREAT SERPL-MCNC: 1.24 MG/DL (ref 0.76–1.27)
GLOBULIN SER CALC-MCNC: 2 GM/DL
GLUCOSE SERPL-MCNC: 136 MG/DL (ref 65–99)
HDLC SERPL-MCNC: 74 MG/DL (ref 40–60)
LDLC SERPL CALC-MCNC: 79 MG/DL (ref 0–100)
POTASSIUM SERPL-SCNC: 4.3 MMOL/L (ref 3.5–5.2)
PROT SERPL-MCNC: 6.1 G/DL (ref 6–8.5)
SODIUM SERPL-SCNC: 140 MMOL/L (ref 136–145)
TRIGL SERPL-MCNC: 107 MG/DL (ref 0–150)
VLDLC SERPL CALC-MCNC: 21.4 MG/DL

## 2020-06-18 ENCOUNTER — OFFICE VISIT (OUTPATIENT)
Dept: INTERNAL MEDICINE | Facility: CLINIC | Age: 75
End: 2020-06-18

## 2020-06-18 VITALS
HEIGHT: 75 IN | TEMPERATURE: 96.4 F | HEART RATE: 120 BPM | DIASTOLIC BLOOD PRESSURE: 80 MMHG | BODY MASS INDEX: 29.47 KG/M2 | SYSTOLIC BLOOD PRESSURE: 110 MMHG | WEIGHT: 237 LBS

## 2020-06-18 DIAGNOSIS — I48.19 PERSISTENT ATRIAL FIBRILLATION (HCC): ICD-10-CM

## 2020-06-18 DIAGNOSIS — E78.49 OTHER HYPERLIPIDEMIA: ICD-10-CM

## 2020-06-18 DIAGNOSIS — R73.9 HYPERGLYCEMIA: ICD-10-CM

## 2020-06-18 DIAGNOSIS — I10 ESSENTIAL HYPERTENSION: Primary | ICD-10-CM

## 2020-06-18 PROCEDURE — 93000 ELECTROCARDIOGRAM COMPLETE: CPT | Performed by: INTERNAL MEDICINE

## 2020-06-18 PROCEDURE — 99214 OFFICE O/P EST MOD 30 MIN: CPT | Performed by: INTERNAL MEDICINE

## 2020-06-18 RX ORDER — ALENDRONATE SODIUM 70 MG/1
TABLET ORAL
COMMUNITY
Start: 2020-06-04 | End: 2020-11-30

## 2020-06-18 RX ORDER — METOPROLOL SUCCINATE 50 MG/1
50 TABLET, EXTENDED RELEASE ORAL DAILY
Qty: 30 TABLET | Refills: 3 | Status: SHIPPED | OUTPATIENT
Start: 2020-06-18 | End: 2020-07-09 | Stop reason: SDUPTHER

## 2020-06-18 NOTE — PROGRESS NOTES
Subjective  Answers for HPI/ROS submitted by the patient on 6/11/2020   What is the primary reason for your visit?: Physical         Chief Complaint   Patient presents with   • Hypertension   • Hyperlipidemia           Gary Grullon is a 74 y.o. male who presents for    Patient Active Problem List   Diagnosis   • Seborrheic keratosis   • Hyperlipidemia   • Hypertension   • Tubular adenoma of colon   • Renal insufficiency, mild   • PMR (polymyalgia rheumatica) (CMS/HCC)   • Hyperglycemia   • Persistent atrial fibrillation (CMS/HCC)       History of Present Illness     He is in the process of weaning down on his prednisone to 5 mg daily for PMR. The pain is down to 1/10. He is playing tennis 4 days per week. He denies palpitations, presyncope, melena, hematochezia, chest pain or dyspnea. He has not been checking his BP. He has no problem with Lipitor.  No Known Allergies    Current Outpatient Medications on File Prior to Visit   Medication Sig Dispense Refill   • alendronate (FOSAMAX) 70 MG tablet      • atorvastatin (LIPITOR) 20 MG tablet Take 1 tablet by mouth Daily. 90 tablet 1   • cholecalciferol (VITAMIN D3) 1000 units tablet Take 1,000 Units by mouth Daily.     • Omega-3 Fatty Acids (FISH OIL) 1200 MG capsule delayed-release Take 1,200 mg by mouth Daily.     • predniSONE (DELTASONE) 5 MG tablet      • vitamin C (ASCORBIC ACID) 500 MG tablet Take 500 mg by mouth Daily.     • [DISCONTINUED] amLODIPine (NORVASC) 5 MG tablet Take 1 tablet by mouth Daily. 90 tablet 1   • [DISCONTINUED] aspirin 81 MG chewable tablet Chew 81 mg Daily.     • [DISCONTINUED] TURMERIC PO Take  by mouth 2 (Two) Times a Day.       No current facility-administered medications on file prior to visit.        Past Medical History:   Diagnosis Date   • Fracture of elbow    • Hyperlipidemia    • Hypertension    • Polymyalgia rheumatica (CMS/HCC)    • Renal insufficiency, mild        Past Surgical History:   Procedure Laterality Date   • CATARACT  EXTRACTION     • COLONOSCOPY  04/04/2018    DR Gary Guerrero [facility]   • CYST REMOVAL  1970   • EYE SURGERY     • PILONIDAL CYST DRAINAGE         Family History   Problem Relation Age of Onset   • COPD Mother    • Hypertension Father    • Diabetes Father    • Stroke Father    • Coronary artery disease Father    • Kidney disease Sister    • Diabetes Brother    • No Known Problems Maternal Aunt    • No Known Problems Maternal Uncle    • No Known Problems Paternal Aunt    • No Known Problems Paternal Uncle    • No Known Problems Maternal Grandmother    • No Known Problems Maternal Grandfather    • No Known Problems Paternal Grandmother    • No Known Problems Paternal Grandfather    • Anesthesia problems Neg Hx    • Broken bones Neg Hx    • Cancer Neg Hx    • Clotting disorder Neg Hx    • Collagen disease Neg Hx    • Dislocations Neg Hx    • Osteoporosis Neg Hx    • Rheumatologic disease Neg Hx    • Scoliosis Neg Hx    • Severe sprains Neg Hx        Social History     Socioeconomic History   • Marital status:      Spouse name: Not on file   • Number of children: Not on file   • Years of education: Not on file   • Highest education level: Not on file   Tobacco Use   • Smoking status: Never Smoker   • Smokeless tobacco: Never Used   Substance and Sexual Activity   • Alcohol use: Yes     Frequency: 4 or more times a week     Drinks per session: 1 or 2     Comment: 2 glasses of wine with dinner   • Drug use: No   • Sexual activity: Defer           The following portions of the patient's history were reviewed and updated as appropriate: problem list, allergies, current medications, past medical history, past family history, past social history and past surgical history.    Review of Systems   Respiratory: Negative for shortness of breath.    Cardiovascular: Negative for chest pain.       Immunization History   Administered Date(s) Administered   • Fluad Quad 09/13/2019   • Fluzone High Dose =>65 Years (Vaxcare  "ONLY) 09/01/2016, 09/06/2018   • Hepatitis A 03/21/2006, 10/25/2011   • Hepatitis B 03/21/2006, 07/08/2007, 02/02/2012   • MMR 03/13/2008   • Meningococcal, Unspecified 01/14/2013   • Pneumococcal Conjugate 13-Valent (PCV13) 08/31/2016   • Pneumococcal Polysaccharide (PPSV23) 12/10/2011   • Polio, Unspecified 01/15/2003   • Shingrix 08/12/2019   • Tdap 01/01/2012, 11/02/2019   • Typhoid, Unspecified 01/14/2013   • Yellow Fever 01/14/2013   • Zostavax 10/19/2012, 08/12/2019       Objective   Vitals:    06/18/20 1117   BP: 110/80   Pulse: 120   Temp: 96.4 °F (35.8 °C)   Weight: 108 kg (237 lb)   Height: 189.2 cm (74.5\")     Body mass index is 30.02 kg/m².  Physical Exam   Constitutional: He appears well-developed and well-nourished.   HENT:   Head: Normocephalic and atraumatic.   Cardiovascular: S1 normal, S2 normal and normal heart sounds. An irregularly irregular rhythm present. Tachycardia present.   Pulmonary/Chest: Effort normal and breath sounds normal.   Neurological: He is alert.   Skin: Skin is warm.   Psychiatric: He has a normal mood and affect.   Vitals reviewed.        ECG 12 Lead  Date/Time: 6/18/2020 12:37 PM  Performed by: Rommel Wilkerson MD  Authorized by: Rommel Wilkerson MD   Comparison: not compared with previous ECG   Rhythm: atrial fibrillation  Rate: tachycardic    Clinical impression: abnormal EKG  Comments: afib with RVR            Assessment/Plan   Edward was seen today for hypertension and hyperlipidemia.    Diagnoses and all orders for this visit:    Essential hypertension  -     metoprolol succinate XL (TOPROL-XL) 50 MG 24 hr tablet; Take 1 tablet by mouth Daily.    Other hyperlipidemia  Comments:  LDL is good. Tolerating Lipitor    Hyperglycemia  Comments:  Likely related to prednisone; it is being tapered.  Orders:  -     Hemoglobin A1c    Persistent atrial fibrillation (CMS/HCC)  -     metoprolol succinate XL (TOPROL-XL) 50 MG 24 hr tablet; Take 1 tablet by mouth Daily.  -     " apixaban (ELIQUIS) 5 MG tablet tablet; Take 1 tablet by mouth Every 12 (Twelve) Hours.  -     Adult Transthoracic Echo Complete W/ Cont if Necessary Per Protocol; Future  -     TSH  -     CBC & Differential  -     Magnesium  -     Ambulatory Referral to Cardiology  -     ECG 12 Lead             New onset asymptomatic afib. Stop aspirin and Norvasc. Start eliquis and toprol; discussed risk bleeding. He will start Prilosec OTC since on Prednisone. His heart rate by me is 120 so I think he is okay to go home and start Toprol since no symptoms. His CHADSVASC2 score is 2.    Return in about 6 weeks (around 7/30/2020), or 30 minutes.

## 2020-06-19 LAB
BASOPHILS # BLD AUTO: 0.05 10*3/MM3 (ref 0–0.2)
BASOPHILS NFR BLD AUTO: 0.5 % (ref 0–1.5)
EOSINOPHIL # BLD AUTO: 0.13 10*3/MM3 (ref 0–0.4)
EOSINOPHIL NFR BLD AUTO: 1.3 % (ref 0.3–6.2)
ERYTHROCYTE [DISTWIDTH] IN BLOOD BY AUTOMATED COUNT: 12.6 % (ref 12.3–15.4)
HBA1C MFR BLD: 6 % (ref 4.8–5.6)
HCT VFR BLD AUTO: 48.7 % (ref 37.5–51)
HGB BLD-MCNC: 16.1 G/DL (ref 13–17.7)
IMM GRANULOCYTES # BLD AUTO: 0.04 10*3/MM3 (ref 0–0.05)
IMM GRANULOCYTES NFR BLD AUTO: 0.4 % (ref 0–0.5)
LYMPHOCYTES # BLD AUTO: 1.77 10*3/MM3 (ref 0.7–3.1)
LYMPHOCYTES NFR BLD AUTO: 18.2 % (ref 19.6–45.3)
MAGNESIUM SERPL-MCNC: 2 MG/DL (ref 1.6–2.4)
MCH RBC QN AUTO: 31.1 PG (ref 26.6–33)
MCHC RBC AUTO-ENTMCNC: 33.1 G/DL (ref 31.5–35.7)
MCV RBC AUTO: 94 FL (ref 79–97)
MONOCYTES # BLD AUTO: 0.66 10*3/MM3 (ref 0.1–0.9)
MONOCYTES NFR BLD AUTO: 6.8 % (ref 5–12)
NEUTROPHILS # BLD AUTO: 7.08 10*3/MM3 (ref 1.7–7)
NEUTROPHILS NFR BLD AUTO: 72.8 % (ref 42.7–76)
NRBC BLD AUTO-RTO: 0 /100 WBC (ref 0–0.2)
PLATELET # BLD AUTO: 283 10*3/MM3 (ref 140–450)
RBC # BLD AUTO: 5.18 10*6/MM3 (ref 4.14–5.8)
TSH SERPL DL<=0.005 MIU/L-ACNC: 2.27 UIU/ML (ref 0.27–4.2)
WBC # BLD AUTO: 9.73 10*3/MM3 (ref 3.4–10.8)

## 2020-06-22 ENCOUNTER — TELEPHONE (OUTPATIENT)
Dept: INTERNAL MEDICINE | Facility: CLINIC | Age: 75
End: 2020-06-22

## 2020-06-22 NOTE — TELEPHONE ENCOUNTER
He sent Roberta an email as he has not heard about his cardiologist appointment. Can you look into and let him know?    Please tell him we don't use email

## 2020-06-25 ENCOUNTER — TELEPHONE (OUTPATIENT)
Dept: INTERNAL MEDICINE | Facility: CLINIC | Age: 75
End: 2020-06-25

## 2020-07-04 ENCOUNTER — TELEPHONE (OUTPATIENT)
Dept: INTERNAL MEDICINE | Facility: CLINIC | Age: 75
End: 2020-07-04

## 2020-07-04 DIAGNOSIS — I48.19 PERSISTENT ATRIAL FIBRILLATION (HCC): Primary | ICD-10-CM

## 2020-07-04 NOTE — TELEPHONE ENCOUNTER
Tell him that with his atrial fibrillation, I am going to refer him for a sleep evaluation to check him for sleep apnea.

## 2020-07-07 ENCOUNTER — OFFICE VISIT (OUTPATIENT)
Dept: CARDIOLOGY | Facility: CLINIC | Age: 75
End: 2020-07-07

## 2020-07-07 ENCOUNTER — HOSPITAL ENCOUNTER (OUTPATIENT)
Dept: CARDIOLOGY | Facility: HOSPITAL | Age: 75
Discharge: HOME | End: 2020-07-07
Admitting: INTERNAL MEDICINE

## 2020-07-07 VITALS
DIASTOLIC BLOOD PRESSURE: 80 MMHG | HEIGHT: 73 IN | WEIGHT: 237 LBS | BODY MASS INDEX: 31.41 KG/M2 | HEART RATE: 63 BPM | SYSTOLIC BLOOD PRESSURE: 120 MMHG

## 2020-07-07 VITALS
DIASTOLIC BLOOD PRESSURE: 84 MMHG | HEART RATE: 53 BPM | SYSTOLIC BLOOD PRESSURE: 126 MMHG | BODY MASS INDEX: 30.75 KG/M2 | OXYGEN SATURATION: 98 % | HEIGHT: 73 IN | WEIGHT: 232 LBS

## 2020-07-07 DIAGNOSIS — M35.3 PMR (POLYMYALGIA RHEUMATICA) (HCC): ICD-10-CM

## 2020-07-07 DIAGNOSIS — I48.0 PAROXYSMAL ATRIAL FIBRILLATION (HCC): Primary | ICD-10-CM

## 2020-07-07 DIAGNOSIS — I48.19 PERSISTENT ATRIAL FIBRILLATION (HCC): ICD-10-CM

## 2020-07-07 DIAGNOSIS — E78.2 MIXED HYPERLIPIDEMIA: ICD-10-CM

## 2020-07-07 DIAGNOSIS — I10 ESSENTIAL HYPERTENSION: ICD-10-CM

## 2020-07-07 PROCEDURE — 99204 OFFICE O/P NEW MOD 45 MIN: CPT | Performed by: INTERNAL MEDICINE

## 2020-07-07 PROCEDURE — 93306 TTE W/DOPPLER COMPLETE: CPT

## 2020-07-07 PROCEDURE — 93306 TTE W/DOPPLER COMPLETE: CPT | Performed by: INTERNAL MEDICINE

## 2020-07-07 PROCEDURE — 93000 ELECTROCARDIOGRAM COMPLETE: CPT | Performed by: INTERNAL MEDICINE

## 2020-07-07 NOTE — PROGRESS NOTES
PATIENTINFORMATION    Date of Office Visit: 2020  Encounter Provider: Yash Soliz MD  Place of Service: [facility] CARDIOLOGY  Patient Name: Gary Grullon  : 1945    Subjective:     Encounter Date:2020      Patient ID: Gary Grullon is a 74 y.o. male.    Chief Complaint   Patient presents with   • Atrial Fibrillation     new patient      HPI  Mr. Grullon is a 74 years old man with past medical history of hyperlipidemia, hypertension, polymyalgia rheumatica referred to cardiology clinic for evaluation treatment of newly diagnosed paroxysmal atrial fibrillation.  He is pretty active physically and plays tennis most days of the week without any significant symptoms and specifically denied any chest pain or shortness of breath.  During routine monitoring of his blood pressure and heart rate at home his machine kept saying irregular heartbeat that prompted him to go to his PCP Dr. Wilkerson 2020 and he was noted to  have A. fib with RVR in the office.  He was a started on metoprolol and Eliquis right after and eventually converted to sinus rhythm.  Baseline rhythm is sinus bradycardia.  He had few recurrences after that and last one was on .  He denied any palpitations, shortness of breath or any other new symptoms during these episodes.  He was noted to have hypertension in the past besides hyperlipidemia that is on treatment.  He was diagnosed with polymyalgia rheumatica after he developed bilateral shoulder pain radiating to both arms and currently on tapering dose of prednisone.   Otherwise no prior diagnosis of heart disease.  He used to have frequent stress tests when he was serving in the [employer] and it was always normal.  No current tobacco, alcohol or recreational drug use.    ROS   All systems reviewed and negative except as noted in HPI.    Past Medical History:   Diagnosis Date   • Fracture of elbow    • Hyperlipidemia    • Hypertension    •  Persistent atrial fibrillation (CMS/HCC)    • Polymyalgia rheumatica (CMS/HCC)    • Renal insufficiency, mild        Past Surgical History:   Procedure Laterality Date   • CATARACT EXTRACTION     • COLONOSCOPY  04/04/2018    DR aGry Guerrero [facility]   • CYST REMOVAL  1970   • EYE SURGERY     • PILONIDAL CYST DRAINAGE         Social History     Socioeconomic History   • Marital status:      Spouse name: Not on file   • Number of children: Not on file   • Years of education: Not on file   • Highest education level: Not on file   Tobacco Use   • Smoking status: Never Smoker   • Smokeless tobacco: Never Used   Substance and Sexual Activity   • Alcohol use: Yes     Frequency: 4 or more times a week     Drinks per session: 1 or 2     Comment: 2 glasses of wine with dinner   • Drug use: No   • Sexual activity: Defer       Family History   Problem Relation Age of Onset   • COPD Mother    • Hypertension Father    • Diabetes Father    • Stroke Father    • Coronary artery disease Father    • Kidney disease Sister    • Diabetes Brother    • No Known Problems Maternal Aunt    • No Known Problems Maternal Uncle    • No Known Problems Paternal Aunt    • No Known Problems Paternal Uncle    • No Known Problems Maternal Grandmother    • No Known Problems Maternal Grandfather    • No Known Problems Paternal Grandmother    • No Known Problems Paternal Grandfather    • Anesthesia problems Neg Hx    • Broken bones Neg Hx    • Cancer Neg Hx    • Clotting disorder Neg Hx    • Collagen disease Neg Hx    • Dislocations Neg Hx    • Osteoporosis Neg Hx    • Rheumatologic disease Neg Hx    • Scoliosis Neg Hx    • Severe sprains Neg Hx            ECG 12 Lead  Date/Time: 7/7/2020 12:32 PM  Performed by: Yash Soliz MD  Authorized by: Yash Soliz MD   Comparison: compared with previous ECG from 6/18/2020  Comparison to previous ECG: A. fib resolved  Rhythm: sinus rhythm  Rate: normal  Conduction: conduction  "normal  ST Segments: ST segments normal  T Waves: T waves normal  QRS axis: normal  Other: no other findings    Clinical impression: normal ECG               Objective:     /84   Pulse 53   Ht 185.4 cm (73\")   Wt 105 kg (232 lb)   SpO2 98%   BMI 30.61 kg/m²  Body mass index is 30.61 kg/m².     Physical Exam   Constitutional: He is oriented to person, place, and time. He appears well-developed and well-nourished. No distress.   HENT:   Head: Normocephalic and atraumatic.   Eyes: Pupils are equal, round, and reactive to light. EOM are normal.   Neck: Normal range of motion. Neck supple. No thyromegaly present.   Cardiovascular: Normal rate, regular rhythm, normal heart sounds and intact distal pulses. Exam reveals no gallop and no friction rub.   No murmur heard.  Pulmonary/Chest: Effort normal and breath sounds normal. No respiratory distress. He has no wheezes. He has no rales. He exhibits no tenderness.   Abdominal: Soft. Bowel sounds are normal. He exhibits no distension. There is no guarding.   Musculoskeletal: Normal range of motion. He exhibits no edema or deformity.   Neurological: He is alert and oriented to person, place, and time. He has normal reflexes. No cranial nerve deficit.   Skin: Skin is warm and dry. No rash noted. He is not diaphoretic.   Psychiatric: He has a normal mood and affect. Judgment normal.       Review Of Data: I have obtained reviewed documents from his PCPs office visit and labs      Assessment/Plan:         Paroxysmal atrial fibrillation (CMS/HCC)    Essential hypertension    Mixed hyperlipidemia    PMR (polymyalgia rheumatica) (CMS/[facility])     Patient with history of asymptomatic paroxysmal A. Fib( [id number] score of 2).  He had echocardiogram in the office today and he has normal left ventricular systolic and diastolic functions.  He had thickening of the noncoronary aortic cusp with no significant aortic valve stenosis or regurgitation.  I will follow-up the valve with a " repeat echocardiogram in 6-12 months.  Continue Eliquis and metoprolol for now.  Patient encouraged to continue regular exercising.  BP controlled in the office today.    Diagnosis and plan of care discussed with patient and verbalized understanding.           Yash Soliz MD  07/07/20  13:28    Answers for HPI/ROS submitted by the patient on 7/6/2020   What is the primary reason for your visit?: Other  Please describe your symptoms.: AFib episode during physical with Dr. Gasper Wilkerson.  Have you had these symptoms before?: Yes  How long have you been having these symptoms?: 1-4 days  Please list any medications you are currently taking for this condition.: As of June 19, 2020 - AFIB Diagnosis, 2- Eliquis (5mg) - prescription (blood thinner), 1 - Toprol   - Metoprol Succ ER 50mg prescription (beta blocker - heart rate / blood pressure), 1 - AtorvaStatin 20mg - prescription - Cholesterol , 1 - Prilosec OTC - (stomach), 1 - Citracal 1200mg - Calcium Supplement, 1 - Vitamin C - 500 mg, 1 - Vitamin D3 - (2,000 I.U.), 2 - Nordic Naturals Complete Omega 3 - (Softgel 565 mg capsules), Prednisone -  5mg decreasing  Please describe any probable cause for these symptoms. : Don’t know - that why I have this appointment and echocardiogram - will have full record of B/P/HR since the [facility] Appointment with me when I arrive showing AFib episodes

## 2020-07-08 LAB
ASCENDING AORTA: 4.2 CM
BH CV ECHO MEAS - ACS: 1.6 CM
BH CV ECHO MEAS - AO MAX PG (FULL): 3.7 MMHG
BH CV ECHO MEAS - AO MAX PG: 7.1 MMHG
BH CV ECHO MEAS - AO MEAN PG (FULL): 2.3 MMHG
BH CV ECHO MEAS - AO MEAN PG: 4.2 MMHG
BH CV ECHO MEAS - AO ROOT AREA (BSA CORRECTED): 1.9
BH CV ECHO MEAS - AO ROOT AREA: 15.1 CM^2
BH CV ECHO MEAS - AO ROOT DIAM: 4.4 CM
BH CV ECHO MEAS - AO V2 MAX: 133 CM/SEC
BH CV ECHO MEAS - AO V2 MEAN: 98.1 CM/SEC
BH CV ECHO MEAS - AO V2 VTI: 30.6 CM
BH CV ECHO MEAS - ASC AORTA: 4.2 CM
BH CV ECHO MEAS - AVA(I,A): 2.1 CM^2
BH CV ECHO MEAS - AVA(I,D): 2.1 CM^2
BH CV ECHO MEAS - AVA(V,A): 2.2 CM^2
BH CV ECHO MEAS - AVA(V,D): 2.2 CM^2
BH CV ECHO MEAS - BSA(HAYCOCK): 2.4 M^2
BH CV ECHO MEAS - BSA: 2.3 M^2
BH CV ECHO MEAS - BZI_BMI: 30.4 KILOGRAMS/M^2
BH CV ECHO MEAS - BZI_METRIC_HEIGHT: 188 CM
BH CV ECHO MEAS - BZI_METRIC_WEIGHT: 107.5 KG
BH CV ECHO MEAS - EDV(MOD-SP2): 97 ML
BH CV ECHO MEAS - EDV(MOD-SP4): 103 ML
BH CV ECHO MEAS - EDV(TEICH): 129 ML
BH CV ECHO MEAS - EF(CUBED): 75.4 %
BH CV ECHO MEAS - EF(MOD-BP): 60.3 %
BH CV ECHO MEAS - EF(MOD-SP2): 61.9 %
BH CV ECHO MEAS - EF(MOD-SP4): 58.3 %
BH CV ECHO MEAS - EF(TEICH): 66.9 %
BH CV ECHO MEAS - ESV(MOD-SP2): 37 ML
BH CV ECHO MEAS - ESV(MOD-SP4): 43 ML
BH CV ECHO MEAS - ESV(TEICH): 42.7 ML
BH CV ECHO MEAS - FS: 37.3 %
BH CV ECHO MEAS - IVS/LVPW: 1
BH CV ECHO MEAS - IVSD: 1.1 CM
BH CV ECHO MEAS - LAT PEAK E' VEL: 8.9 CM/SEC
BH CV ECHO MEAS - LV DIASTOLIC VOL/BSA (35-75): 44.1 ML/M^2
BH CV ECHO MEAS - LV MASS(C)D: 206.6 GRAMS
BH CV ECHO MEAS - LV MASS(C)DI: 88.5 GRAMS/M^2
BH CV ECHO MEAS - LV MAX PG: 3.3 MMHG
BH CV ECHO MEAS - LV MEAN PG: 1.9 MMHG
BH CV ECHO MEAS - LV SYSTOLIC VOL/BSA (12-30): 18.4 ML/M^2
BH CV ECHO MEAS - LV V1 MAX: 91.3 CM/SEC
BH CV ECHO MEAS - LV V1 MEAN: 65.2 CM/SEC
BH CV ECHO MEAS - LV V1 VTI: 19.6 CM
BH CV ECHO MEAS - LVIDD: 5.2 CM
BH CV ECHO MEAS - LVIDS: 3.3 CM
BH CV ECHO MEAS - LVLD AP2: 8.7 CM
BH CV ECHO MEAS - LVLD AP4: 8.8 CM
BH CV ECHO MEAS - LVLS AP2: 7.4 CM
BH CV ECHO MEAS - LVLS AP4: 7.5 CM
BH CV ECHO MEAS - LVOT AREA (M): 3.1 CM^2
BH CV ECHO MEAS - LVOT AREA: 3.3 CM^2
BH CV ECHO MEAS - LVOT DIAM: 2 CM
BH CV ECHO MEAS - LVPWD: 1 CM
BH CV ECHO MEAS - MED PEAK E' VEL: 7.2 CM/SEC
BH CV ECHO MEAS - MR MAX PG: 40.7 MMHG
BH CV ECHO MEAS - MR MAX VEL: 319 CM/SEC
BH CV ECHO MEAS - MV A DUR: 0.11 SEC
BH CV ECHO MEAS - MV A MAX VEL: 73.3 CM/SEC
BH CV ECHO MEAS - MV DEC SLOPE: 158.2 CM/SEC^2
BH CV ECHO MEAS - MV DEC TIME: 0.24 SEC
BH CV ECHO MEAS - MV E MAX VEL: 87.4 CM/SEC
BH CV ECHO MEAS - MV E/A: 1.2
BH CV ECHO MEAS - MV MAX PG: 3.2 MMHG
BH CV ECHO MEAS - MV MEAN PG: 1.3 MMHG
BH CV ECHO MEAS - MV P1/2T MAX VEL: 78.7 CM/SEC
BH CV ECHO MEAS - MV P1/2T: 145.8 MSEC
BH CV ECHO MEAS - MV V2 MAX: 89.6 CM/SEC
BH CV ECHO MEAS - MV V2 MEAN: 54.1 CM/SEC
BH CV ECHO MEAS - MV V2 VTI: 38.3 CM
BH CV ECHO MEAS - MVA P1/2T LCG: 2.8 CM^2
BH CV ECHO MEAS - MVA(P1/2T): 1.5 CM^2
BH CV ECHO MEAS - MVA(VTI): 1.7 CM^2
BH CV ECHO MEAS - PA ACC TIME: 0.17 SEC
BH CV ECHO MEAS - PA MAX PG (FULL): 2.2 MMHG
BH CV ECHO MEAS - PA MAX PG: 3.7 MMHG
BH CV ECHO MEAS - PA PR(ACCEL): 4.1 MMHG
BH CV ECHO MEAS - PA V2 MAX: 96.4 CM/SEC
BH CV ECHO MEAS - PULM A REVS DUR: 0.11 SEC
BH CV ECHO MEAS - PULM A REVS VEL: 27.9 CM/SEC
BH CV ECHO MEAS - PULM DIAS VEL: 36.3 CM/SEC
BH CV ECHO MEAS - PULM S/D: 1.6
BH CV ECHO MEAS - PULM SYS VEL: 59.4 CM/SEC
BH CV ECHO MEAS - PVA(V,A): 1.8 CM^2
BH CV ECHO MEAS - PVA(V,D): 1.8 CM^2
BH CV ECHO MEAS - QP/QS: 0.72
BH CV ECHO MEAS - RAP SYSTOLE: 3 MMHG
BH CV ECHO MEAS - RV BASE (<4.1) - OBSOLETE: 3.4 CM
BH CV ECHO MEAS - RV LENGTH (<8.5) - OBSOLETE: 6 CM
BH CV ECHO MEAS - RV MAX PG: 1.5 MMHG
BH CV ECHO MEAS - RV MEAN PG: 0.95 MMHG
BH CV ECHO MEAS - RV V1 MAX: 62.1 CM/SEC
BH CV ECHO MEAS - RV V1 MEAN: 47.1 CM/SEC
BH CV ECHO MEAS - RV V1 VTI: 16 CM
BH CV ECHO MEAS - RVOT AREA: 2.9 CM^2
BH CV ECHO MEAS - RVOT DIAM: 1.9 CM
BH CV ECHO MEAS - RVSP: 22.6 MMHG
BH CV ECHO MEAS - SI(AO): 197.9 ML/M^2
BH CV ECHO MEAS - SI(CUBED): 45.1 ML/M^2
BH CV ECHO MEAS - SI(LVOT): 27.4 ML/M^2
BH CV ECHO MEAS - SI(MOD-SP2): 25.7 ML/M^2
BH CV ECHO MEAS - SI(MOD-SP4): 25.7 ML/M^2
BH CV ECHO MEAS - SI(TEICH): 37 ML/M^2
BH CV ECHO MEAS - SV(AO): 462.3 ML
BH CV ECHO MEAS - SV(CUBED): 105.4 ML
BH CV ECHO MEAS - SV(LVOT): 64 ML
BH CV ECHO MEAS - SV(MOD-SP2): 60 ML
BH CV ECHO MEAS - SV(MOD-SP4): 60 ML
BH CV ECHO MEAS - SV(RVOT): 46 ML
BH CV ECHO MEAS - SV(TEICH): 86.3 ML
BH CV ECHO MEAS - TAPSE (>1.6): 2.6 CM2
BH CV ECHO MEAS - TR MAX VEL: 221.3 CM/SEC
BH CV ECHO MEASUREMENTS AVERAGE E/E' RATIO: 10.86
BH CV XLRA - RV BASE: 3.4 CM
BH CV XLRA - RV LENGTH: 6 CM
BH CV XLRA - RV MID: 2.5 CM
BH CV XLRA - TDI S': 14 CM/SEC
LEFT ATRIUM VOLUME INDEX: 23 ML/M2
MAXIMAL PREDICTED HEART RATE: 147 BPM
SINUS: 3.7 CM
STJ: 3.8 CM
STRESS TARGET HR: 125 BPM

## 2020-07-09 ENCOUNTER — TELEPHONE (OUTPATIENT)
Dept: INTERNAL MEDICINE | Facility: CLINIC | Age: 75
End: 2020-07-09

## 2020-07-09 DIAGNOSIS — I48.19 PERSISTENT ATRIAL FIBRILLATION (HCC): ICD-10-CM

## 2020-07-09 DIAGNOSIS — I10 ESSENTIAL HYPERTENSION: ICD-10-CM

## 2020-07-09 RX ORDER — METOPROLOL SUCCINATE 50 MG/1
50 TABLET, EXTENDED RELEASE ORAL DAILY
Qty: 30 TABLET | Refills: 3 | Status: SHIPPED | OUTPATIENT
Start: 2020-07-09 | End: 2020-07-20 | Stop reason: SDUPTHER

## 2020-07-09 NOTE — TELEPHONE ENCOUNTER
PATIENT IS CALLING TO GET A MEDICATION SENT TO PHARMACY:    metoprolol succinate XL (TOPROL-XL) 50 MG 24 hr tablet    PATIENT ONLY HAS ONE LEFT.    IT WAS SENT TO HIS MAIL ORDER PHARMACY AND IT IS DELAYED SAYING DUE TO CHANGING TO THIS PHARMACY; CAN THIS MED BE CALLED IN TO [facility] SINCE HE WILL NOT GET IN TIME.    BEST CONTACT PHONE#: [phone]

## 2020-07-20 DIAGNOSIS — I48.19 PERSISTENT ATRIAL FIBRILLATION (HCC): ICD-10-CM

## 2020-07-20 DIAGNOSIS — I10 ESSENTIAL HYPERTENSION: ICD-10-CM

## 2020-07-20 RX ORDER — METOPROLOL SUCCINATE 50 MG/1
50 TABLET, EXTENDED RELEASE ORAL DAILY
Qty: 60 TABLET | Refills: 0 | Status: SHIPPED | OUTPATIENT
Start: 2020-07-20 | End: 2020-09-16

## 2020-07-20 NOTE — TELEPHONE ENCOUNTER
PATIENT CALLED FOR MED REFILL    apixaban (ELIQUIS) 5 MG tablet tablet  90 DAY SUPPLY    metoprolol succinate XL (TOPROL-XL) 50 MG 24 hr tablet  A 30 DAY SUPPLY WAS SENT TO [facility] AND ALSO HAD A 3 MONTH REFILL. HE WOULD LIKE FOR THE REMAINDER OF THE 3 MONTH REFILL( 2 MONTHS)  TO GO TO     [facility] - [address] - [phone]  - [phone]   [phone]    PATIENT CALL BACK NUMBER [phone]    HE ALSO WANTS THE ELIQUIS TO GO TO [facility] AS WELL 90 DAY SUPPLY    HE ALSO WANTS TO KNOW WHY DR. LOZA WANTED TO HIM TO DO A SLEEP STUDY, HE HAS APPOINTMENT ON Thursday. IT WAS NEVER DISCUSSED. PLEASE ADVISE

## 2020-07-23 ENCOUNTER — OFFICE VISIT (OUTPATIENT)
Dept: SLEEP MEDICINE | Facility: HOSPITAL | Age: 75
End: 2020-07-23

## 2020-07-23 VITALS
WEIGHT: 236.4 LBS | SYSTOLIC BLOOD PRESSURE: 135 MMHG | HEART RATE: 60 BPM | BODY MASS INDEX: 31.33 KG/M2 | HEIGHT: 73 IN | DIASTOLIC BLOOD PRESSURE: 74 MMHG | OXYGEN SATURATION: 97 %

## 2020-07-23 DIAGNOSIS — R06.83 SNORING: ICD-10-CM

## 2020-07-23 DIAGNOSIS — G47.30 OBSERVED SLEEP APNEA: Primary | ICD-10-CM

## 2020-07-23 DIAGNOSIS — I48.19 PERSISTENT ATRIAL FIBRILLATION (HCC): ICD-10-CM

## 2020-07-23 PROCEDURE — G0463 HOSPITAL OUTPT CLINIC VISIT: HCPCS

## 2020-07-23 PROCEDURE — 99204 OFFICE O/P NEW MOD 45 MIN: CPT | Performed by: INTERNAL MEDICINE

## 2020-07-23 NOTE — PROGRESS NOTES
[facility]  [address] [address]  Phone   Fax       Gary Grullon  1945  74 y.o.  male      Referring Provider and PCP Rommel Wilkerson MD    Type of service: Initial consult  Date of service: 7/23/2020      Chief Complaint   Patient presents with   • Witnessed Apnea   • Snoring       History of present illness;  Thank you for asking to see Gary Grullon, 74 y.o.  for evaluation of sleep apnea. The patient was seen today on 7/23/2020 at [facility].   Patient is a retired [employer] officer.  He has developed a new onset of atrial fibrillation and is here for evaluation of sleep apnea.  He reports that occasionally he had snoring in the past.    Patient gives the following sleep history.  Sleep schedule:  Bedtime: Between 11 and 12 midnight  Wake time: 8 AM  Normally takes about 10 minutes to fall asleep  Average hours of sleep 7-8  Number of naps per day none  When patient wakes up still feels tired and not rested  Snoring; yes  Witnessed apneas; not sure  Have you ever awakened gasping for breath, coughing, choking: Not sure      Past Medical History:   Diagnosis Date   • Fracture of elbow    • Hyperlipidemia    • Hypertension    • Persistent atrial fibrillation (CMS/HCC)    • Polymyalgia rheumatica (CMS/HCC)    • Renal insufficiency, mild        Social history:  Shift work: No  Tobacco use: No  Alcohol use: 2/day  Caffeinated drinks: 2  Over-the-counter sleeping aid and medications: No  Narcotic medications: No    Family Hx  Family history of collapse even with his brother  Family History   Problem Relation Age of Onset   • COPD Mother    • Hypertension Father    • Diabetes Father    • Stroke Father    • Coronary artery disease Father    • Kidney disease Sister    • Diabetes Brother    • No Known Problems Maternal Aunt    • No Known Problems Maternal Uncle    • No Known Problems Paternal Aunt    • No Known Problems  Paternal Uncle    • No Known Problems Maternal Grandmother    • No Known Problems Maternal Grandfather    • No Known Problems Paternal Grandmother    • No Known Problems Paternal Grandfather    • Anesthesia problems Neg Hx    • Broken bones Neg Hx    • Cancer Neg Hx    • Clotting disorder Neg Hx    • Collagen disease Neg Hx    • Dislocations Neg Hx    • Osteoporosis Neg Hx    • Rheumatologic disease Neg Hx    • Scoliosis Neg Hx    • Severe sprains Neg Hx        Medications: reviewed    Current Outpatient Medications:   •  alendronate (FOSAMAX) 70 MG tablet, , Disp: , Rfl:   •  apixaban (ELIQUIS) 5 MG tablet tablet, Take 1 tablet by mouth Every 12 (Twelve) Hours., Disp: 180 tablet, Rfl: 0  •  atorvastatin (LIPITOR) 20 MG tablet, Take 1 tablet by mouth Daily., Disp: 90 tablet, Rfl: 1  •  cholecalciferol (VITAMIN D3) 1000 units tablet, Take 1,000 Units by mouth Daily., Disp: , Rfl:   •  metoprolol succinate XL (TOPROL-XL) 50 MG 24 hr tablet, Take 1 tablet by mouth Daily., Disp: 60 tablet, Rfl: 0  •  Omega-3 Fatty Acids (FISH OIL) 1200 MG capsule delayed-release, Take 1,200 mg by mouth Daily., Disp: , Rfl:   •  predniSONE (DELTASONE) 5 MG tablet, , Disp: , Rfl:   •  vitamin C (ASCORBIC ACID) 500 MG tablet, Take 500 mg by mouth Daily., Disp: , Rfl:     Review of systems:  Drayden Sleepiness Scale: Total score: 4   Positive for snoring, fatigue  Negative for shortness of breath, cough, wheezing, chest pain, nausea and vomiting, palpitation, swelling of feet:    Morning headaches: no  Awaken with sore throat or dry mouth :no  Leg jerking at night: no  Crawly feeling/urge sensation to move in the legs:no  Teeth grinding:no  Sleepwalking, nightmares, muscle weakness with laughing or anger,sleep paralysis: no  Nasal Congestion: no  Nocturia (how many times/night): 2  Memory Problem: no  Change in weight, gained about 10 pounds    Physical exam:  Vitals:    07/23/20 1452   BP: 135/74   Pulse: 60   SpO2: 97%   Weight: 107 kg  "(236 lb 6.4 oz)   Height: 185.4 cm (73\")    Body mass index is 31.19 kg/m². Neck Circumference: 16.25 inches  HEENT: Head is atraumatic, normocephalic   Eyes:pupils are round equal and reacting to light and accommodation, conjunctiva normal  Nose:no nasal septal defects or deviation and the nasal passages are clear, no nasal polyps,   Throat: tonsils are not enlarged, tongue normal size, oral airway Mallampati class 2  NECK: No lymphadenopathy, trachea is in the midline, thyroid not enlarged  RESPIRATORY SYSTEM: Breath sounds are equal on both sides, there are no wheezes or crackles  CARDIOVASULAR SYSTEM: Heart sounds are regular rhythm and jadon rate, there are no murmurs or thrills  ABDOMEN: Soft, no hepatosplenomegaly, no evidence of ascites  EXTREMITES: No cyanosis, clubbing or edema   NEUROLOGICAL SYSTEM: Oriented x 3, no gross neurological defects, gait normal    Medical records and labs reviewed in Epic thyroid function normal    Assessment and plan:  · Sleep apnea unspecified, (G47.30) Patient's symptoms and examination is consistent with sleep apnea.  I have talked to the patient about the signs and symptoms of sleep apnea and consequences of untreated sleep apnea. Discussed sleep testing.  I have placed a order in epic for a home sleep test.  Patient will have a follow-up after this sleep test is done.   · Obesity, patient's BMI is Body mass index is 31.19 kg/m².. I have discussed the relationship between weight and sleep apnea.There is direct correction between weight and severity of sleep apnea.  Weight reduction is encouraged. I have also discussed with the patient diet and exercise.  · Snoring secondary to sleep apnea  · New onset of atrial fibrillation      I once again thank you for asking me to see this patient in consultation and I have forwarded my opinion and treatment plan.  Please do not hesitate to call me if you have any questions.     Justin Phipps MD, [facility]  Sleep " Medicine.(Board-certified)  [facility]  Medical Director for [facility]  7/23/2020 ,

## 2020-07-31 ENCOUNTER — TELEPHONE (OUTPATIENT)
Dept: INTERNAL MEDICINE | Facility: CLINIC | Age: 75
End: 2020-07-31

## 2020-07-31 ENCOUNTER — OFFICE VISIT (OUTPATIENT)
Dept: INTERNAL MEDICINE | Facility: CLINIC | Age: 75
End: 2020-07-31

## 2020-07-31 VITALS
HEIGHT: 73 IN | HEART RATE: 54 BPM | WEIGHT: 237.8 LBS | TEMPERATURE: 97.9 F | DIASTOLIC BLOOD PRESSURE: 82 MMHG | BODY MASS INDEX: 31.51 KG/M2 | SYSTOLIC BLOOD PRESSURE: 122 MMHG

## 2020-07-31 DIAGNOSIS — I10 ESSENTIAL HYPERTENSION: ICD-10-CM

## 2020-07-31 DIAGNOSIS — R73.03 PRE-DIABETES: Primary | ICD-10-CM

## 2020-07-31 DIAGNOSIS — I48.19 PERSISTENT ATRIAL FIBRILLATION (HCC): ICD-10-CM

## 2020-07-31 PROCEDURE — 99214 OFFICE O/P EST MOD 30 MIN: CPT | Performed by: INTERNAL MEDICINE

## 2020-07-31 NOTE — TELEPHONE ENCOUNTER
PATIENT STATES HE SENT US THE INFORMATION FOR HIS LATEST SHINGLES VACCINATION BUT IS STILL RECEIVING REMINDERS ON [employer]. PLEASE ADVISE    PATIENT CALLBACK [phone]

## 2020-07-31 NOTE — PROGRESS NOTES
Subjective        Chief Complaint   Patient presents with   • Hypertension           Gary Grullon is a 74 y.o. male who presents for    Patient Active Problem List   Diagnosis   • Seborrheic keratosis   • Hyperlipidemia   • Hypertension   • Tubular adenoma of colon   • Renal insufficiency, mild   • PMR (polymyalgia rheumatica) (CMS/HCC)   • Persistent atrial fibrillation (CMS/HCC)   • Observed sleep apnea   • Snoring   • Pre-diabetes       History of Present Illness     He feels like he goes into afib once per week. Exercise makes it go away. He denies chest pain or dyspnea. His BP has been 115/67. His heart rate has been 55-60. He saw sleep medicine and he is to have a home sleep study.  No Known Allergies    Current Outpatient Medications on File Prior to Visit   Medication Sig Dispense Refill   • alendronate (FOSAMAX) 70 MG tablet      • apixaban (ELIQUIS) 5 MG tablet tablet Take 1 tablet by mouth Every 12 (Twelve) Hours. 180 tablet 0   • atorvastatin (LIPITOR) 20 MG tablet Take 1 tablet by mouth Daily. 90 tablet 1   • cholecalciferol (VITAMIN D3) 1000 units tablet Take 1,000 Units by mouth Daily.     • metoprolol succinate XL (TOPROL-XL) 50 MG 24 hr tablet Take 1 tablet by mouth Daily. 60 tablet 0   • Omega-3 Fatty Acids (FISH OIL) 1200 MG capsule delayed-release Take 1,200 mg by mouth Daily.     • predniSONE (DELTASONE) 5 MG tablet      • vitamin C (ASCORBIC ACID) 500 MG tablet Take 500 mg by mouth Daily.       No current facility-administered medications on file prior to visit.        Past Medical History:   Diagnosis Date   • Fracture of elbow    • Hyperlipidemia    • Hypertension    • Persistent atrial fibrillation (CMS/HCC)    • Polymyalgia rheumatica (CMS/HCC)    • Renal insufficiency, mild        Past Surgical History:   Procedure Laterality Date   • CATARACT EXTRACTION     • COLONOSCOPY  04/04/2018    DR [facility]errero [facility]   • CYST REMOVAL  1970   • EYE SURGERY     • PILONIDAL CYST DRAINAGE          Family History   Problem Relation Age of Onset   • COPD Mother    • Hypertension Father    • Diabetes Father    • Stroke Father    • Coronary artery disease Father    • Kidney disease Sister    • Diabetes Brother    • No Known Problems Maternal Aunt    • No Known Problems Maternal Uncle    • No Known Problems Paternal Aunt    • No Known Problems Paternal Uncle    • No Known Problems Maternal Grandmother    • No Known Problems Maternal Grandfather    • No Known Problems Paternal Grandmother    • No Known Problems Paternal Grandfather    • Anesthesia problems Neg Hx    • Broken bones Neg Hx    • Cancer Neg Hx    • Clotting disorder Neg Hx    • Collagen disease Neg Hx    • Dislocations Neg Hx    • Osteoporosis Neg Hx    • Rheumatologic disease Neg Hx    • Scoliosis Neg Hx    • Severe sprains Neg Hx        Social History     Socioeconomic History   • Marital status:      Spouse name: Not on file   • Number of children: Not on file   • Years of education: Not on file   • Highest education level: Not on file   Tobacco Use   • Smoking status: Never Smoker   • Smokeless tobacco: Never Used   Substance and Sexual Activity   • Alcohol use: Yes     Frequency: 4 or more times a week     Drinks per session: 1 or 2     Comment: 2 glasses of wine with dinner   • Drug use: No   • Sexual activity: Defer           The following portions of the patient's history were reviewed and updated as appropriate: problem list, allergies, current medications, past medical history, past family history, past social history and past surgical history.    Review of Systems   Respiratory: Negative for shortness of breath.    Cardiovascular: Negative for chest pain.       Immunization History   Administered Date(s) Administered   • Fluad Quad 09/13/2019   • Fluzone High Dose =>65 Years ([employer] ONLY) 09/01/2016, 09/06/2018   • Hepatitis A 03/21/2006, 10/25/2011   • Hepatitis B 03/21/2006, 07/08/2007, 02/02/2012   • MMR 03/13/2008   •  "Meningococcal, Unspecified 01/14/2013   • Pneumococcal Conjugate 13-Valent (PCV13) 08/31/2016   • Pneumococcal Polysaccharide (PPSV23) 12/10/2011   • Polio, Unspecified 01/15/2003   • Shingrix 08/12/2019   • Tdap 01/01/2012, 11/02/2019   • Typhoid, Unspecified 01/14/2013   • Yellow Fever 01/14/2013   • Zostavax 10/19/2012, 08/12/2019       Objective   Vitals:    07/31/20 0836   BP: 122/82   Pulse: 54   Temp: 97.9 °F (36.6 °C)   Weight: 108 kg (237 lb 12.8 oz)   Height: 185.4 cm (73\")     Body mass index is 31.37 kg/m².  Physical Exam   Constitutional: He appears well-developed and well-nourished.   HENT:   Head: Normocephalic and atraumatic.   Cardiovascular: Normal rate, regular rhythm, S1 normal, S2 normal and normal heart sounds.   Pulmonary/Chest: Effort normal and breath sounds normal.   Neurological: He is alert.   Skin: Skin is warm.   Psychiatric: He has a normal mood and affect.   Vitals reviewed.      Procedures    Assessment/Plan   Gary was seen today for hypertension.    Diagnoses and all orders for this visit:    Pre-diabetes  -     Hemoglobin A1c; Future    Essential hypertension  -     Comprehensive Metabolic Panel; Future    Persistent atrial fibrillation (CMS/HCC)      BP is excellent. He met with cards and is tolerating his meds. He is to have a sleep study. 25 minutes with over 50 percent on coordinating and counseling especially reviewing his increased risks with COVID with age, being male, HTN, and afib. Discussed not a good time for grandchildren from [place] coming to visit.           Return in about 4 months (around 11/30/2020), or 30 minutes.  "

## 2020-08-20 ENCOUNTER — HOSPITAL ENCOUNTER (OUTPATIENT)
Dept: SLEEP MEDICINE | Facility: HOSPITAL | Age: 75
Discharge: HOME | End: 2020-08-20
Admitting: INTERNAL MEDICINE

## 2020-08-20 DIAGNOSIS — R06.83 SNORING: ICD-10-CM

## 2020-08-20 DIAGNOSIS — I48.19 PERSISTENT ATRIAL FIBRILLATION (HCC): ICD-10-CM

## 2020-08-20 DIAGNOSIS — G47.30 OBSERVED SLEEP APNEA: ICD-10-CM

## 2020-08-20 PROCEDURE — 95806 SLEEP STUDY UNATT&RESP EFFT: CPT | Performed by: INTERNAL MEDICINE

## 2020-08-20 PROCEDURE — 95806 SLEEP STUDY UNATT&RESP EFFT: CPT

## 2020-08-28 ENCOUNTER — TELEPHONE (OUTPATIENT)
Dept: SLEEP MEDICINE | Facility: HOSPITAL | Age: 75
End: 2020-08-28

## 2020-08-28 NOTE — TELEPHONE ENCOUNTER
Called patient with the results of his sleep study.  He chose not to pursue CPAP treatment at this time and scheduled a follow up appointment to discuss with Dr. Phipps, the study and other treatment options on 10/1/2020 at 8:45am.

## 2020-09-15 DIAGNOSIS — I48.19 PERSISTENT ATRIAL FIBRILLATION (HCC): ICD-10-CM

## 2020-09-15 DIAGNOSIS — I10 ESSENTIAL HYPERTENSION: ICD-10-CM

## 2020-09-16 ENCOUNTER — TELEPHONE (OUTPATIENT)
Dept: INTERNAL MEDICINE | Facility: CLINIC | Age: 75
End: 2020-09-16

## 2020-09-16 DIAGNOSIS — I48.19 PERSISTENT ATRIAL FIBRILLATION (HCC): ICD-10-CM

## 2020-09-16 RX ORDER — METOPROLOL SUCCINATE 50 MG
TABLET, EXTENDED RELEASE 24 HR ORAL
Qty: 60 TABLET | Refills: 5 | Status: SHIPPED | OUTPATIENT
Start: 2020-09-16 | End: 2021-01-19

## 2020-09-16 NOTE — TELEPHONE ENCOUNTER
Patient is calling again to make sure Dr. Wilkerson did receive a fax that showed that he received the second part of the shingles shot.  He got the first one on in 08/2019 which is in his record.    Please advise.    Patient call back [phone]

## 2020-09-16 NOTE — TELEPHONE ENCOUNTER
Patient is calling to request a New 90 da RX with refills for mail order.     apixaban (ELIQUIS) 5 MG tablet tablet     [facility] - [place], [place] [address] - [phone] [facility] [phone]   [phone]    Please advise.    Patient call back [phone]

## 2020-09-16 NOTE — TELEPHONE ENCOUNTER
I don't see under immunizations and I don't see a fax in media.    Can you call him and just enter date of second Shingrix?    Thank you

## 2020-09-18 DIAGNOSIS — I48.19 PERSISTENT ATRIAL FIBRILLATION (HCC): ICD-10-CM

## 2020-09-18 NOTE — TELEPHONE ENCOUNTER
PATIENT STATES HE HAS CALLED [facility] AND THEY SAID ALL YOU NEED TO DO TO GET MEDICATION ORDERED FOR  HIM IS TO CALL [phone]. HE IS WORRIED HE WILL NOT GET IT BEFORE HE RUNS OUT.    PLEASE ADVISE  [phone]

## 2020-10-01 ENCOUNTER — OFFICE VISIT (OUTPATIENT)
Dept: SLEEP MEDICINE | Facility: HOSPITAL | Age: 75
End: 2020-10-01

## 2020-10-01 VITALS
BODY MASS INDEX: 30.24 KG/M2 | HEART RATE: 54 BPM | HEIGHT: 75 IN | WEIGHT: 243.2 LBS | OXYGEN SATURATION: 96 % | DIASTOLIC BLOOD PRESSURE: 86 MMHG | SYSTOLIC BLOOD PRESSURE: 153 MMHG

## 2020-10-01 DIAGNOSIS — I48.19 PERSISTENT ATRIAL FIBRILLATION (HCC): ICD-10-CM

## 2020-10-01 DIAGNOSIS — R06.83 SNORING: ICD-10-CM

## 2020-10-01 DIAGNOSIS — G47.33 OSA (OBSTRUCTIVE SLEEP APNEA): Primary | ICD-10-CM

## 2020-10-01 PROCEDURE — G0463 HOSPITAL OUTPT CLINIC VISIT: HCPCS

## 2020-10-01 PROCEDURE — 99214 OFFICE O/P EST MOD 30 MIN: CPT | Performed by: INTERNAL MEDICINE

## 2020-10-01 NOTE — PROGRESS NOTES
[facility]  [address] [facility]  3rd [place] [address]  Phone   Fax       SLEEP CLINIC FOLLOW UP PROGRESS NOTE.    Gary Grullon  1945  75 y.o.  male      PCP: Rommel Wilkerson MD      Date of visit: 10/1/2020    Chief Complaint   Patient presents with   • Sleep Apnea       INTERM HISTORY:  This is a 75 y.o. years old patient who has a history of snoring and new onset of atrial fibrillation was evaluated for sleep apnea underwent a home sleep test.  The home sleep test shows moderate sleep apnea with AHI of 15/h and in supine position it is severe at 49/h and he has snoring for 27% of sleep time.  Patient is here to discuss the test results and treatment options.  He is a retired [employer].    Sleep schedule  Normally goes to bed at between 11 and 12 midnight  Wakes up at 8 AM  Do you feel refreshed after waking up ?:  Yes          Past Medical History:   Diagnosis Date   • Fracture of elbow    • Hyperlipidemia    • Hypertension    • HEDY (obstructive sleep apnea)     AHI 15/h supine 49/h   • Persistent atrial fibrillation (CMS/HCC)    • Polymyalgia rheumatica (CMS/HCC)    • Renal insufficiency, mild        MEDICATIONS: reviewed by me    Current Outpatient Medications:   •  alendronate (FOSAMAX) 70 MG tablet, , Disp: , Rfl:   •  apixaban (ELIQUIS) 5 MG tablet tablet, Take 1 tablet by mouth Every 12 (Twelve) Hours., Disp: 180 tablet, Rfl: 3  •  atorvastatin (LIPITOR) 20 MG tablet, Take 1 tablet by mouth Daily., Disp: 90 tablet, Rfl: 1  •  cholecalciferol (VITAMIN D3) 1000 units tablet, Take 1,000 Units by mouth Daily., Disp: , Rfl:   •  Omega-3 Fatty Acids (FISH OIL) 1200 MG capsule delayed-release, Take 1,200 mg by mouth Daily., Disp: , Rfl:   •  predniSONE (DELTASONE) 5 MG tablet, , Disp: , Rfl:   •  Toprol XL 50 MG 24 hr tablet, TAKE 1 TABLET DAILY, Disp: 60 tablet, Rfl: 5  •  vitamin C (ASCORBIC ACID) 500 MG tablet, Take 500 mg by mouth Daily., Disp:  ", Rfl:     No Known Allergies reviewed by me    SOCIAL, FAMILY HISTORY: Medical records are reviewed and noted by me.  History of smoking no  History of alcohol use, 10/week  Caffeine use 2 cups of coffee    REVIEW OF SYSTEMS:   [place] Sleepiness Scale :Total score: 2   Snoring: Yes  Morning headache: No  Nasal congestion: No  Leg movements: No  Number of times you wake up once asleep: 1  Heart burn no    PHYSICAL EXAMINATION:  Vitals:    10/01/20 0838   BP: 153/86   Pulse: 54   SpO2: 96%   Weight: 110 kg (243 lb 3.2 oz)   Height: 189.2 cm (74.5\")    Body mass index is 30.81 kg/m².    HEENT: pupils are round equal and reacting to light and accommodation, nasal passage is clear, no nasal polyps, no lymphadenopathy, throat is clear, oral airway Mallampati class 2  RESPRATORY SYSTEM: Breath sounds are equal on both sides and are normal, no wheezes or crackles  CARDIOVASULAR SYSTEM: Heart rate is regular without murmur  ABDOMEN: Soft, no ascites, no hepatosplenomegaly.  EXTREMITIES: No cyanosis, clubbing or edema       ASSESSMENT AND PLAN:  · Obstructive sleep apnea, had a long discussion with the patient about the test results and the treatment options which include CPAP or a mandibular advancement device with weight loss.  I did also mention inspire but patient is not interested in the device.  Finally patient is agreed to try the CPAP.  I am going to send him to [facility] for a mask fitting and give him an auto CPAP.  I will see him again 6 to 8 weeks for another follow-up for compliance  · Obesity, patient's BMI is Body mass index is 30.81 kg/m²..  I have discussed weight reduction and the health benefits.  I have also discuss the relationship between the weight and sleep apnea and encouraged the patient to lose weight which is beneficial in treating sleep apnea. Discussed diet and exercise with the patient.  · Atrial fibrillation on anticoagulation and medical management  · Return to clinic in 2 months for " follow-up        Justin Phipps MD  Sleep Medicine.(Board-certified)  [facility]  Medical Director for [facility]  10/1/2020

## 2020-11-13 NOTE — TELEPHONE ENCOUNTER
PT informed and understood information.    He also wanted me to ask you if it is ok to take the Prilosec otc everyday? He said the package states to not take this longer then 14 days.  This was a video telehealth visit.  Patient agreed to the visit.  He was at home and I connected from my office.    SUBJECTIVE:     Nakul Doss is a 66 year old male     He comes in for a follow-up on his Kidney transplant and chronic allograft dysfunction.       He was admitted at [facility] 6/4/16 through 6/10/16 with a right heel ulcer. Osteomyelitis was ruled out. Calcaneal osteotomy did not show any osteomyelitis. Urine culture grew Enterobacter but he has an ileal conduit. He was discharged on Vantin 200 mg po bid for ten days. Due to hypotension, lisinopril was discontinued. Creatinine was 1.77 on admission and 1.21 on discharge.            Has chronic low back pain and weakness in his legs.    In August of 2012, he   underwent a minimally invasive L3-L4 laminectomy for lumbar stenosis.  He did not improve and continued to have pain.  On 1/8/13 he had T12-L1 laminectomy and untethering of the spinal cord and   L3-L5 laminectomy and fusion. Creatinine did go up to 1.6.    He had LE venous dopplers for mass thought to be an inflamed lymph node   superior to one of his common femoral arteries. It is unclear exactly which one, because that   was not defined in the report.     CT of the pelvis on 4/10/13 does not show any lymphadenopathy.    No fever or chills.    His last creatinine was 1.1 GFR >60 mL/min on 11/6/2020.   Kidney transplant October 1990 from sister.     Urine for microalbumin in urine 2.15g/G on 8/8/7,2.47g/g on 1/25/08,2.833 g/g on 12/26/08,2.98 g/g on 11/12/09.  Urine microalbumin 886 mcg/mg on 7/10/12, 1419.4 mcg/mg on 7/16/13.  Urine microalbumin 1713 mg/g on 1//18//14, 4.696 g/g on 9/13/14, 4.1282 g/g on 6/8/15, 2.9262 g/g on 4/18/16, 881.6 mg/g on 6/20/2017, 2.6362 g/g on 12/11/2017, too high to calculate on 4/9/2018, 2.5885 g/g on 5/20/2019, 2.4399 g/g on 11/6/2020.    Urine protein/Creatinine 8.32 on 10/10/15,7.85 on 1/23/16, 8.26 on 4/18/16, 5.74 on 8/25/2018, 3.63 on 1/17/2019, 6  g/g on 9/20/2019.    PTH 40 on 10/22/07,67.2 on 12/26/08, 50 on 4/28/14, 32.8 , on 1/23/16, 37.2 on 1/20/2020.        He was admitted to [facility] 4/7/15 to 4/18/15 with sepsis due to cellulitis left leg. Had acute renal failure with Creatinine 2.9. Improved to 1.1 on discharge.     He was admitted at [facility] 2/16/16 to 2/24/16.  Admitted with acute osteomyelitis and right calcaneus.  Had acute renal failure with a Creatinine of 1.73. Discharge Creatinine 1.25.  Was discharged on Rocephin. Treated 2/24/16 to 4/13/16.   Right IJ line removed on 4/14/16.      On 12/22/16 he had a colonoscopy done. Large ulcerated rectal polyp, internal and external hemorrhoids and diverticulosis.  Polyp positive for CMV. Imuran reduced to 50 mg daily and treated with valganciclovir. Now back on Imuran 50 mg po bid.  Colonoscopy 3/13/17 colon polyps. Negative for CMV.    At times he notices there is some leak of fluid as the edema increases and he developed blisters and skin breakdown.  Wrapping his legs to try to reduce the edema.  Treated for cellulitis on 12/20/2018 with Keflex.    Developed an open wound and cellulitis on the right leg.  Seen at immediate care 5/6/2019.  Treated with Keflex.    He has not noticed any skin breakdown in the legs.    For edema of legs, takes Furosemide 40 mg once a day if needed.    He has chronic low back pain. Takes [place].     Admitted [facility] 7/10/2020 and discharged on 7/15/2020.  He had cellulitis and gram-negative sepsis.  Lactic acid was elevated at 2.9.  Creatinine had gone up to 1.8.  He was treated with cefepime and vancomycin.  Subsequently antibiotics changed to Ancef.  Blood cultures positive for gram-negative bacteria.  But subsequently no growth.      Reports edema is controlled and there is no skin breakdown.        ALLERGIES:   Allergen Reactions   • Penicillin G RASH   • Glimepiride Nausea & Vomiting             Current Outpatient Medications   Medication Sig   •  HYDROcodone-acetaminophen (NORCO) 7.5-325 MG per tablet Take 1 tablet by mouth every 6 hours as needed for Pain. Supervising MD is Dr. Oumar Napoles MD   • Lantus SoloStar 100 UNIT/ML pen-injector Inject 16-20 Units into the skin nightly.   • bisacodyl (Dulcolax) 5 MG EC tablet See Colonoscopy Instructions.   • simethicone (MYLICON) 125 MG chewable tablet See Colonoscopy Instructions.   • electrolyte/PEG 3350 (Golytely) 236 g solution See Colonoscopy Instructions.   • predniSONE (DELTASONE) 5 MG tablet TAKE 2 AND 1/2 TABLETS BY MOUTH DAILY   • B-D U/F PEN NEEDLE 5/16\" 31G X 8 MM Misc INJECT INTO THE SKIN ONCE DAILY   • azaTHIOprine (IMURAN) 50 MG tablet TAKE TWO TABLETS BY MOUTH DAILY    • Blood Glucose Monitoring Suppl (ACCU-CHEK SHAWN PLUS) w/Device Kit 1 kit 2 (two) times a day. E11.22.   • blood glucose (ACCU-CHEK SHAWN PLUS) test strip Test blood sugar two times daily as directed. Diagnosis: 1E11.22.   • SOFTCLIX LANCETS Misc Test Bid E11.22   • atorvastatin (LIPITOR) 40 MG tablet Take 1 tablet by mouth nightly.   • metFORMIN (GLUCOPHAGE-XR) 500 MG 24 hr tablet Take 1 tablet by mouth 2 times daily.   • furosemide (LASIX) 40 MG tablet Take 1 tablet by mouth daily as needed (edema).   • potassium CHLORIDE (KLOR-CON M) 20 MEQ german ER tablet Take 1 tablet by mouth daily. To be taken with the furosemide. Supervising MD is Dr. Oumar Napoles MD   • naLOXone (NARCAN) 4 MG/0.1ML nasal spray Call 911. [place] the content of 1 device into 1 nostril. May repeat with 2nd device in alternate nostril if no response in 2-3 minutes.   • naLOXone (NARCAN) 4 MG/0.1ML nasal spray Call 911. [place] the content of 1 device into 1 nostril. May repeat with 2nd device in alternate nostril if no response in 2-3 minutes.   • Probiotic Product (PROBIOTIC ACIDOPHILUS BIOBEADS) Cap One tab daily while on antibiotics   • Folic Acid 5 MG Cap    • ferrous sulfate 325 (65 FE) MG tablet Take 325 mg by mouth.   • aspirin 81 MG tablet    •  ascorbic acid (VITAMIN C) 250 MG tablet    • albuterol (VENTOLIN) (2.5 MG/3ML) 0.083% nebulizer solution Inhale 2.5 mg into the lungs.   • albuterol 108 (90 Base) MCG/ACT inhaler Inhale 2 puffs into the lungs.   • MULTIPLE VITAMIN PO            Immunization History   Administered Date(s) Administered   • Influenza, Unspecified Formulation 01/05/2001, 12/19/2001, 10/23/2002, 01/28/2004, 10/19/2004, 11/02/2006, 11/01/2007, 11/05/2008, 10/31/2009, 11/02/2010, 10/25/2011, 11/13/2012, 10/29/2013, 10/28/2014, 10/13/2015, 10/31/2016, 10/16/2017, 10/16/2018   • Influenza, injectable, quadrivalent, preservative-free 09/07/2019, 11/09/2020   • Novel Influenza [id number], Unspecified Formulation 01/20/2010   • Pneumococcal Conjugate 13 valent 06/09/2015   • Pneumococcal polysaccharide, adult, 23 valent 10/19/2004, 07/30/2020   • Shingles Zoster (Shingrix) 05/22/2019, 09/07/2019   • Tdap 12/19/2012   • Tetanus Toxoid, Unspecified Formulation 11/11/2001       PMH:   Upper endoscopy 7/11/5 Gastritis, duodenitis, +Helicobacter Treated with Helidac     Colonoscopy 7/11/5 Tubular adenoma and Internal hemorrhoids. Colon polyp s/p polypectomy 3/14/12. Colonoscopy 12/13/13. Colon polyps.    Influenza Type A 2/9/6     Hemorrhoids       Cellulitis of the left finger treated with septra 9/24/07    Hydrocelectomy left 1/24/12        SOCIAL HISTORY:   Quit smoking Feb 2006. No alchohol or drug abuse.    ROS     No Chest pain, shortness of breath. No abd pain.     No abdominal pain    No fever or chills.    No nausea, vomiting or diarrhea.    Eye exam 3/11/2019 shows no diabetic retinopathy.      Received steroid injection on 12/20/2018 into the right shoulder.      OBJECTIVE:       Telehealth visit.          Last Labs:  Lab Results   Component Value Date/Time    CREATININESE 1.1 11/06/2020 12:43 PM    CREATININESE 1.2 01/20/2020 11:30 AM    CREATININESE 1.3 09/20/2019 10:51 AM    BLOODUREANIT 18 11/06/2020 12:43 PM    BLOODUREANIT 25  01/20/2020 11:30 AM    BLOODUREANIT 20 09/20/2019 10:51 AM    YC0LZBHRZ 27 11/06/2020 12:43 PM    UQ1IUZJIZ 31 01/20/2020 11:30 AM    KPOTASSIUMSE 3.6 11/06/2020 12:43 PM    KPOTASSIUMSE 3.7 01/20/2020 11:30 AM       Lab Results   Component Value Date/Time    EGFR* >60 11/06/2020 12:43 PM    EGFR*NONAFRI >60 11/06/2020 12:43 PM     GFR was calculated from most recent Creatinine:  Lab Results   Component Value Date/Time    CREATININESE 1.1 11/06/2020 12:43 PM       Lab Results   Component Value Date/Time    HEMOGLOBIN 12.1 (L) 11/06/2020 12:43 PM    HEMOGLOBIN 13.2 (L) 01/20/2020 11:55 AM    HEMOGLOBIN 12.8 (L) 09/20/2019 10:51 AM    HEMATOCRIT 39.4 (L) 11/06/2020 12:43 PM    HEMATOCRIT 41.5 01/20/2020 11:55 AM    HEMATOCRIT 41.2 09/20/2019 10:51 AM        Iron Saturation:  Lab Results   Component Value Date/Time    FERRITINSERU 77 01/20/2020 11:30 AM    FERRITINSERU 76 09/20/2019 10:51 AM     Lab Results   Component Value Date/Time    PARATHYROIDH 37.2 01/20/2020 11:30 AM    CALCIUMSERUM 9.2 11/06/2020 12:43 PM    CALCIUMSERUM 9.8 01/20/2020 11:30 AM    CALCIUMSERUM 9.8 01/20/2020 11:30 AM    PHOSPHORUSSE 3.5 01/20/2020 11:30 AM    PHOSPHORUSSE 2.7 01/23/2016 11:20 AM       Lab Results   Component Value Date/Time    IRONBINDINGC 271 01/20/2020 11:30 AM    ENTIRON 35 01/20/2020 11:30 AM            Lab Results   Component Value Date/Time    URPRTNMGDL >600.0 (H) 09/20/2019 10:51 AM    CREATININEUR 127.3 (H) 09/20/2019 10:51 AM    URPRTNCRTRAT UNABLE TO CALCULATE DUE TO INCREASED PROTEIN 09/20/2019 10:51 AM     Lab Results   Component Value Date/Time    MICALBUR 2,153.6 11/06/2020 12:43 PM    CREATURRND 88.3 11/06/2020 12:43 PM    MICALBCRTRT 2,439.9 (H) 11/06/2020 12:43 PM       A1C 6.8% on 7/6/2020.    PSA 2.87 on 9/20/19.    Hep C negative on 9/20/2019.    Results for VIVEK NOE (MRN [id number]) as of 11/12/2020 20:05   Ref. Range 11/6/2020 12:43   CHOLESTEROL Latest Ref Range: 140 - 200 mg/dL 160   HDL  Latest Ref Range: >40 mg/dL 34 (L)   LDL (Direct) Latest Ref Range: 30 - 100 mg/dL 84   TRIGLYCERIDE Latest Ref Range: 0 - 200 mg/dL 278 (H)           ASSESSMENT/PLAN:         1. Status post kidney transplant, status post ileal conduit.     2. Diabetes mellitus type 2. controlled.    3. Ex smoker Feb 2006.     4. Hyperlipidemia.     5. Arthritis, status post fusion, right ankle.     6. Chronic Allograft dysfunction/Diabetic nephropathy/CKD stage undetermined    7. Chronic immunosuppression. Continue Prednisone/Imuran.  Immunosuppressed status.    8. CKD     9. Proteinuria    10. Acute renal failure - resolved.    11. Anemia CKD and iron deficiency.    12. Recurrent leg cellulitis.    13. Hypertension             Creatinine is stable.     He may have diabetic nephropathy. Urine protein is high. Not sure if this is reliable. He has an ileal conduit.    Blood pressure has been excellent. Not on any medication.    Continue low fat low cholesterol diet. Triglycerides elevated and HDL low. Triglycerides are lower. Continue Lipitor. Follow up with Dr Napoles.    Continue diabetic diet, Lantus and Metformin.       Monitor anemia.  Hemoglobin is stable.  Ferritin is low at 76. Continue iron pill.    Return after four months for a follow up.    Last PTH normal.    Immunosuppression: same meds.     Importance of diabetes control discussed. A1c is lower.    Some of the records since the last visit reviewed.      CBC,CMP,Ferritin, Urine protein/Creatinine random and transferrin saturation after 3 months.        Emre Gilliam MD

## 2020-11-23 RX ORDER — ATORVASTATIN CALCIUM 20 MG/1
TABLET, FILM COATED ORAL
Qty: 90 TABLET | Refills: 3 | Status: SHIPPED | OUTPATIENT
Start: 2020-11-23 | End: 2021-09-28

## 2020-11-24 ENCOUNTER — RESULTS ENCOUNTER (OUTPATIENT)
Dept: INTERNAL MEDICINE | Facility: CLINIC | Age: 75
End: 2020-11-24

## 2020-11-24 DIAGNOSIS — R73.03 PRE-DIABETES: ICD-10-CM

## 2020-11-24 DIAGNOSIS — I10 ESSENTIAL HYPERTENSION: ICD-10-CM

## 2020-11-30 ENCOUNTER — OFFICE VISIT (OUTPATIENT)
Dept: INTERNAL MEDICINE | Facility: CLINIC | Age: 75
End: 2020-11-30

## 2020-11-30 VITALS
WEIGHT: 243 LBS | HEART RATE: 54 BPM | DIASTOLIC BLOOD PRESSURE: 80 MMHG | BODY MASS INDEX: 30.21 KG/M2 | HEIGHT: 75 IN | TEMPERATURE: 97 F | SYSTOLIC BLOOD PRESSURE: 116 MMHG

## 2020-11-30 DIAGNOSIS — N28.9 RENAL INSUFFICIENCY, MILD: ICD-10-CM

## 2020-11-30 DIAGNOSIS — R73.03 PRE-DIABETES: ICD-10-CM

## 2020-11-30 DIAGNOSIS — Z12.5 SCREENING FOR PROSTATE CANCER: ICD-10-CM

## 2020-11-30 DIAGNOSIS — I10 ESSENTIAL HYPERTENSION: Primary | ICD-10-CM

## 2020-11-30 DIAGNOSIS — G47.33 OSA (OBSTRUCTIVE SLEEP APNEA): ICD-10-CM

## 2020-11-30 PROBLEM — E78.49 OTHER HYPERLIPIDEMIA: Status: ACTIVE | Noted: 2019-05-30

## 2020-11-30 LAB
ALBUMIN SERPL-MCNC: 4.3 G/DL (ref 3.5–5.2)
ALBUMIN/GLOB SERPL: 2 G/DL
ALP SERPL-CCNC: 58 U/L (ref 39–117)
ALT SERPL-CCNC: 21 U/L (ref 1–41)
AST SERPL-CCNC: 25 U/L (ref 1–40)
BILIRUB SERPL-MCNC: 0.8 MG/DL (ref 0–1.2)
BUN SERPL-MCNC: 17 MG/DL (ref 8–23)
BUN/CREAT SERPL: 12.5 (ref 7–25)
CALCIUM SERPL-MCNC: 9.3 MG/DL (ref 8.6–10.5)
CHLORIDE SERPL-SCNC: 104 MMOL/L (ref 98–107)
CO2 SERPL-SCNC: 24.9 MMOL/L (ref 22–29)
CREAT SERPL-MCNC: 1.36 MG/DL (ref 0.76–1.27)
GLOBULIN SER CALC-MCNC: 2.1 GM/DL
GLUCOSE SERPL-MCNC: 106 MG/DL (ref 65–99)
HBA1C MFR BLD: 5.6 % (ref 4.8–5.6)
POTASSIUM SERPL-SCNC: 4.3 MMOL/L (ref 3.5–5.2)
PROT SERPL-MCNC: 6.4 G/DL (ref 6–8.5)
PSA SERPL-MCNC: 1.38 NG/ML (ref 0–4)
SODIUM SERPL-SCNC: 139 MMOL/L (ref 136–145)

## 2020-11-30 PROCEDURE — 99214 OFFICE O/P EST MOD 30 MIN: CPT | Performed by: INTERNAL MEDICINE

## 2020-11-30 NOTE — PROGRESS NOTES
Subjective        Chief Complaint   Patient presents with   • Hypertension           Gary Grullon is a 75 y.o. male who presents for    Patient Active Problem List   Diagnosis   • Seborrheic keratosis   • Other hyperlipidemia   • Essential hypertension   • Tubular adenoma of colon   • Renal insufficiency, mild   • PMR (polymyalgia rheumatica) (CMS/HCC)   • Persistent atrial fibrillation (CMS/HCC)   • HEDY (obstructive sleep apnea)   • Pre-diabetes       History of Present Illness     He played tennis 5 days last week. He denies chest pain or dyspnea. He saw the sleep doctor in October and he was told that he has HEDY. He is wearing the CPAP all night and he has more energy. He has been checking his BP and it runs 120/80. He sees rheum every 3-4 months. His Prednisone has been weaned to 5 mg daily as he hurt with 3 mg.   No Known Allergies    Current Outpatient Medications on File Prior to Visit   Medication Sig Dispense Refill   • apixaban (ELIQUIS) 5 MG tablet tablet Take 1 tablet by mouth Every 12 (Twelve) Hours. 180 tablet 3   • atorvastatin (LIPITOR) 20 MG tablet TAKE 1 TABLET DAILY 90 tablet 3   • cholecalciferol (VITAMIN D3) 1000 units tablet Take 1,000 Units by mouth Daily.     • Omega-3 Fatty Acids (FISH OIL) 1200 MG capsule delayed-release Take 1,200 mg by mouth Daily.     • predniSONE (DELTASONE) 5 MG tablet Daily. Takes 3mg every other day and 4 every other day     • Toprol XL 50 MG 24 hr tablet TAKE 1 TABLET DAILY 60 tablet 5   • vitamin C (ASCORBIC ACID) 500 MG tablet Take 500 mg by mouth Daily.     • [DISCONTINUED] alendronate (FOSAMAX) 70 MG tablet        No current facility-administered medications on file prior to visit.        Past Medical History:   Diagnosis Date   • Fracture of elbow    • HEDY (obstructive sleep apnea)     AHI 15/h supine 49/h   • Persistent atrial fibrillation (CMS/HCC)    • Polymyalgia rheumatica (CMS/HCC)    • Renal insufficiency, mild        Past Surgical History:   Procedure  Laterality Date   • CATARACT EXTRACTION     • [facility]   • CYST REMOVAL  1970   • EYE SURGERY     • PILONIDAL CYST DRAINAGE         Family History   Problem Relation Age of Onset   • COPD Mother    • Hypertension Father    • Diabetes Father    • Stroke Father    • Coronary artery disease Father    • Kidney disease Sister    • Diabetes Brother    • No Known Problems Maternal Aunt    • No Known Problems Maternal Uncle    • No Known Problems Paternal Aunt    • No Known Problems Paternal Uncle    • No Known Problems Maternal Grandmother    • No Known Problems Maternal Grandfather    • No Known Problems Paternal Grandmother    • No Known Problems Paternal Grandfather    • Anesthesia problems Neg Hx    • Broken bones Neg Hx    • Cancer Neg Hx    • Clotting disorder Neg Hx    • Collagen disease Neg Hx    • Dislocations Neg Hx    • Osteoporosis Neg Hx    • Rheumatologic disease Neg Hx    • Scoliosis Neg Hx    • Severe sprains Neg Hx        Social History     Socioeconomic History   • Marital status:      Spouse name: Not on file   • Number of children: Not on file   • Years of education: Not on file   • Highest education level: Not on file   Tobacco Use   • Smoking status: Never Smoker   • Smokeless tobacco: Never Used   Substance and Sexual Activity   • Alcohol use: Yes     Frequency: 4 or more times a week     Drinks per session: 1 or 2     Comment: 2 glasses of wine with dinner   • Drug use: No   • Sexual activity: Defer           The following portions of the patient's history were reviewed and updated as appropriate: problem list, allergies, current medications, past medical history, past family history, past social history and past surgical history.    Review of Systems   Respiratory: Negative for shortness of breath.    Cardiovascular: Negative for chest pain.       Immunization History   Administered Date(s) Administered   • Fluad Quad 65+ 09/13/2019   •  "Fluzone High Dose =>65 Years ([employer] ONLY) 09/01/2016, 09/06/2018, 09/01/2020   • Hepatitis A 03/21/2006, 10/25/2011   • Hepatitis B 03/21/2006, 07/08/2007, 02/02/2012   • MMR 03/13/2008   • Meningococcal, Unspecified 01/14/2013   • Pneumococcal Conjugate 13-Valent (PCV13) 08/31/2016   • Pneumococcal Polysaccharide (PPSV23) 12/10/2011   • Polio, Unspecified 01/15/2003   • Shingrix 08/12/2019, 06/19/2020   • Tdap 01/01/2012, 11/02/2019   • Typhoid, Unspecified 01/14/2013   • Yellow Fever 01/14/2013   • Zostavax 10/19/2012, 08/12/2019       Objective   Vitals:    11/30/20 0753   BP: 116/80   Pulse: 54   Temp: 97 °F (36.1 °C)   Weight: 110 kg (243 lb)   Height: 189.2 cm (74.5\")     Body mass index is 30.78 kg/m².  Physical Exam  Vitals signs reviewed.   Constitutional:       Appearance: He is well-developed.   HENT:      Head: Normocephalic and atraumatic.   Cardiovascular:      Rate and Rhythm: Regular rhythm. Bradycardia present.      Heart sounds: Normal heart sounds, S1 normal and S2 normal.   Pulmonary:      Effort: Pulmonary effort is normal.      Breath sounds: Normal breath sounds.   Skin:     General: Skin is warm.   Neurological:      Mental Status: He is alert.   Psychiatric:         Behavior: Behavior normal.         Procedures    Assessment/Plan   Diagnoses and all orders for this visit:    1. Essential hypertension (Primary)  Comments:  BP is good  Orders:  -     Lipid Panel With / Chol / HDL Ratio; Future    2. HEDY (obstructive sleep apnea)  Comments:  Using CPAP and he feels more energetic with it    3. Pre-diabetes  Comments:  a1c today  Orders:  -     Hemoglobin A1c  -     Hemoglobin A1c; Future    4. Renal insufficiency, mild  Comments:  Check Cr today  Orders:  -     Comprehensive Metabolic Panel  -     Comprehensive Metabolic Panel; Future    5. Screening for prostate cancer  -     PSA Screen             BP is good. He is in the process of weaning prednisone. Answered questions about Covid and " vaccines.    Return in about 6 months (around 5/30/2021) for Medicare Wellness.

## 2021-01-07 ENCOUNTER — OFFICE VISIT (OUTPATIENT)
Dept: SLEEP MEDICINE | Facility: HOSPITAL | Age: 76
End: 2021-01-07

## 2021-01-07 VITALS
HEIGHT: 75 IN | HEART RATE: 54 BPM | OXYGEN SATURATION: 96 % | BODY MASS INDEX: 29.84 KG/M2 | DIASTOLIC BLOOD PRESSURE: 84 MMHG | WEIGHT: 240 LBS | SYSTOLIC BLOOD PRESSURE: 132 MMHG

## 2021-01-07 DIAGNOSIS — Z99.89 OSA ON CPAP: Primary | ICD-10-CM

## 2021-01-07 DIAGNOSIS — G47.33 OSA ON CPAP: Primary | ICD-10-CM

## 2021-01-07 PROBLEM — E66.09 CLASS 1 OBESITY DUE TO EXCESS CALORIES WITHOUT SERIOUS COMORBIDITY WITH BODY MASS INDEX (BMI) OF 31.0 TO 31.9 IN ADULT: Status: ACTIVE | Noted: 2021-01-07

## 2021-01-07 PROBLEM — E66.811 CLASS 1 OBESITY DUE TO EXCESS CALORIES WITHOUT SERIOUS COMORBIDITY WITH BODY MASS INDEX (BMI) OF 31.0 TO 31.9 IN ADULT: Status: ACTIVE | Noted: 2021-01-07

## 2021-01-07 PROCEDURE — G0463 HOSPITAL OUTPT CLINIC VISIT: HCPCS

## 2021-01-07 PROCEDURE — 99213 OFFICE O/P EST LOW 20 MIN: CPT | Performed by: INTERNAL MEDICINE

## 2021-01-07 NOTE — PROGRESS NOTES
[facility]  [address]  3rd [place] [address]  Phone   Fax       SLEEP CLINIC FOLLOW UP PROGRESS NOTE.    Gary Grullon  1945  75 y.o.  male      PCP: Rommel Wilkerson MD      Date of visit: 1/7/2021    Chief Complaint   Patient presents with   • Sleep Apnea   • Follow-up       INTERM HISTORY:  This is a 75 y.o. years old patient who has a history of sleep apnea and is on CPAP.  Patient reports good compliance with the device.  Patient has moderate sleep apnea with AHI of 15/h and in supine it is 49.  He is using the CPAP on a regular basis and feels much better when he wakes up.  He is retired.    Sleep schedule  Normally goes to bed at 11 PM  Wakes up at 7 AM  Do you feel refreshed after waking up ?:  Yes      The Smart card downloaded on 1/7/2021 has been reviewed by me and shows the following..  Compliance; 100%  > 4 hr use, 100%  Average use of the device 8 hours and 5 minutes per night  Residual AHI: 1.0 /hr (normal less than 5)  Mask type: Nasal mask  DME: [employer]      Past Medical History:   Diagnosis Date   • Fracture of elbow    • HEDY (obstructive sleep apnea)     AHI 15/h supine 49/h   • Persistent atrial fibrillation (CMS/HCC)    • Polymyalgia rheumatica (CMS/HCC)    • Renal insufficiency, mild        MEDICATIONS: reviewed by me    Current Outpatient Medications:   •  apixaban (ELIQUIS) 5 MG tablet tablet, Take 1 tablet by mouth Every 12 (Twelve) Hours., Disp: 180 tablet, Rfl: 3  •  atorvastatin (LIPITOR) 20 MG tablet, TAKE 1 TABLET DAILY, Disp: 90 tablet, Rfl: 3  •  cholecalciferol (VITAMIN D3) 1000 units tablet, Take 1,000 Units by mouth Daily., Disp: , Rfl:   •  Omega-3 Fatty Acids (FISH OIL) 1200 MG capsule delayed-release, Take 1,200 mg by mouth Daily., Disp: , Rfl:   •  predniSONE (DELTASONE) 5 MG tablet, Daily. Takes 3mg every other day and 4 every other day, Disp: , Rfl:   •  Toprol XL 50 MG 24 hr tablet, TAKE 1 TABLET DAILY,  "Disp: 60 tablet, Rfl: 5  •  vitamin C (ASCORBIC ACID) 500 MG tablet, Take 500 mg by mouth Daily., Disp: , Rfl:     No Known Allergies reviewed by me    SOCIAL, FAMILY HISTORY: Medical records are reviewed and noted by me.  History of smoking no  History of alcohol use yes  Caffeine use 2    REVIEW OF SYSTEMS:   [place] Sleepiness Scale :Total score: 3   Snoring: Solved  Morning headache: No  Nasal congestion: No  Leg movements: No  Number of times you wake up once asleep: 1  Heart burn no    PHYSICAL EXAMINATION:  Vitals:    01/07/21 0829   BP: 132/84   Pulse: 54   SpO2: 96%   Weight: 109 kg (240 lb)   Height: 189.2 cm (74.5\")    Body mass index is 30.4 kg/m².    HEENT: pupils are round equal and reacting to light and accommodation, nasal passage is clear, no nasal polyps, no lymphadenopathy, throat is clear, oral airway Mallampati class 3  RESPRATORY SYSTEM: Breath sounds are equal on both sides and are normal, no wheezes or crackles  CARDIOVASULAR SYSTEM: Heart rate is regular without murmur  ABDOMEN: Soft, no ascites, no hepatosplenomegaly.  EXTREMITIES: No cyanosis, clubbing or edema       ASSESSMENT AND PLAN:  · Obstructive sleep apnea, patient is using the device with good compliance for treatment of sleep apnea.  I have reviewed the smart card down load and discussed with the patient the download data and encouarged the patient to continue to use the device.  The symptoms have improved and the residual AHI is acceptable.  The device is benefiting the patient and the device is medically necessary. The patient is also instructed to get the supplies from the [employer] and a prescription for supplies has been sent to the DME company.    · Obesity, patient's BMI is Body mass index is 30.4 kg/m²..  I have discussed weight reduction and the health benefits.  I have also discuss the relationship between the weight and sleep apnea and encouraged the patient to lose weight which is beneficial in treating sleep apnea. " Discussed diet and exercise with the patient.    · Return to clinic in 12 months for follow-up        Justin Phipps MD  Sleep Medicine  Medical Director for [facility]  1/7/2021

## 2021-01-19 ENCOUNTER — OFFICE VISIT (OUTPATIENT)
Dept: CARDIOLOGY | Facility: CLINIC | Age: 76
End: 2021-01-19

## 2021-01-19 VITALS
DIASTOLIC BLOOD PRESSURE: 82 MMHG | WEIGHT: 242 LBS | BODY MASS INDEX: 30.09 KG/M2 | SYSTOLIC BLOOD PRESSURE: 128 MMHG | HEART RATE: 63 BPM | HEIGHT: 75 IN | OXYGEN SATURATION: 98 %

## 2021-01-19 DIAGNOSIS — E78.00 HYPERCHOLESTEREMIA: ICD-10-CM

## 2021-01-19 DIAGNOSIS — Z99.89 OSA ON CPAP: ICD-10-CM

## 2021-01-19 DIAGNOSIS — M35.3 PMR (POLYMYALGIA RHEUMATICA) (HCC): ICD-10-CM

## 2021-01-19 DIAGNOSIS — G47.33 OSA ON CPAP: ICD-10-CM

## 2021-01-19 DIAGNOSIS — N18.2 CKD (CHRONIC KIDNEY DISEASE) STAGE 2, GFR 60-89 ML/MIN: ICD-10-CM

## 2021-01-19 DIAGNOSIS — I48.0 PAROXYSMAL ATRIAL FIBRILLATION (HCC): Primary | ICD-10-CM

## 2021-01-19 DIAGNOSIS — I10 ESSENTIAL HYPERTENSION: ICD-10-CM

## 2021-01-19 DIAGNOSIS — Z79.01 CHRONIC ANTICOAGULATION: ICD-10-CM

## 2021-01-19 PROBLEM — I48.19 PERSISTENT ATRIAL FIBRILLATION: Status: RESOLVED | Noted: 2020-06-18 | Resolved: 2021-01-19

## 2021-01-19 PROCEDURE — 93000 ELECTROCARDIOGRAM COMPLETE: CPT | Performed by: INTERNAL MEDICINE

## 2021-01-19 PROCEDURE — 99214 OFFICE O/P EST MOD 30 MIN: CPT | Performed by: INTERNAL MEDICINE

## 2021-01-19 RX ORDER — METOPROLOL SUCCINATE 25 MG/1
25 TABLET, EXTENDED RELEASE ORAL DAILY
Qty: 90 TABLET | Refills: 3 | OUTPATIENT
Start: 2021-01-19 | End: 2021-12-20

## 2021-01-19 NOTE — PROGRESS NOTES
PATIENTINFORMATION    Date of Office Visit: 2021  Encounter Provider: Yash Soliz MD  Place of Service: [facility] CARDIOLOGY  Patient Name: Gary Grullon  : 1945    Subjective:     Encounter Date:2021      Patient ID: Gary Grullon is a 75 y.o. male.    Chief Complaint   Patient presents with   • Atrial Fibrillation     6 months    • Diabetes     HPI  Mr. Grullon is a 75 years old man with past medical history of paroxysmal atrial fibrillation, hypertension, type II DM and polymyalgia rheumatica came to cardiology clinic for follow-up visit.  Since I saw him last time he reports only few recurrence of palpitation that did not last long and denied any bleeding from any site.  He exercise regularly playing tennis and denied any rest or exertional symptoms.  He checks his blood pressure at home and runs normal.  Today he reports he gets occasionally lightheaded when he stands up but denied passing out.  Otherwise no other significant complaints today.  He is compliant with his medications and denied ER visits since last visit.  No bleeding from any site      ROS  All systems reviewed and he reports  right shoulder pain for which she uses Tylenol otherwise negative as noted in HPI.      Past Medical History:   Diagnosis Date   • Fracture of elbow    • HEDY (obstructive sleep apnea)     AHI 15/h supine 49/h   • Persistent atrial fibrillation (CMS/HCC)    • Polymyalgia rheumatica (CMS/HCC)    • Renal insufficiency, mild        Past Surgical History:   Procedure Laterality Date   • CATARACT EXTRACTION     • COLONOSCOPY  2018    DR [facility]   • CYST REMOVAL     • EYE SURGERY     • PILONIDAL CYST DRAINAGE         Social History     Socioeconomic History   • Marital status:      Spouse name: Not on file   • Number of children: Not on file   • Years of education: Not on file   • Highest education level: Not on file   Tobacco Use   •  "Smoking status: Never Smoker   • Smokeless tobacco: Never Used   Substance and Sexual Activity   • Alcohol use: Yes     Frequency: 4 or more times a week     Drinks per session: 1 or 2     Comment: 2 glasses of wine with dinner   • Drug use: No   • Sexual activity: Defer       Family History   Problem Relation Age of Onset   • COPD Mother    • Hypertension Father    • Diabetes Father    • Stroke Father    • Coronary artery disease Father    • Kidney disease Sister    • Diabetes Brother    • No Known Problems Maternal Aunt    • No Known Problems Maternal Uncle    • No Known Problems Paternal Aunt    • No Known Problems Paternal Uncle    • No Known Problems Maternal Grandmother    • No Known Problems Maternal Grandfather    • No Known Problems Paternal Grandmother    • No Known Problems Paternal Grandfather    • Anesthesia problems Neg Hx    • Broken bones Neg Hx    • Cancer Neg Hx    • Clotting disorder Neg Hx    • Collagen disease Neg Hx    • Dislocations Neg Hx    • Osteoporosis Neg Hx    • Rheumatologic disease Neg Hx    • Scoliosis Neg Hx    • Severe sprains Neg Hx            ECG 12 Lead    Date/Time: 1/19/2021 11:56 AM  Performed by: Yash Soliz MD  Authorized by: Yash Soliz MD   Comparison: compared with previous ECG from 7/7/2020  Comparison to previous ECG: PACs noted on this tracing  Rhythm: sinus rhythm  Ectopy: atrial premature contractions  Rate: normal  Conduction: conduction normal  ST Segments: ST segments normal  T Waves: T waves normal  QRS axis: normal  Other: no other findings    Clinical impression: abnormal EKG               Objective:     /82   Pulse 63   Ht 189.2 cm (74.5\")   Wt 110 kg (242 lb)   SpO2 98%   BMI 30.66 kg/m²  Body mass index is 30.66 kg/m².     Constitutional:       General: Not in acute distress.     Appearance: Well-developed. Not diaphoretic.   Eyes:      Pupils: Pupils are equal, round, and reactive to light.   HENT:      Head: Normocephalic " and atraumatic.   Neck:      Musculoskeletal: Normal range of motion and neck supple.      Thyroid: No thyromegaly.   Pulmonary:      Effort: Pulmonary effort is normal. No respiratory distress.      Breath sounds: Normal breath sounds. No wheezing. No rales.   Chest:      Chest wall: Not tender to palpatation.   Cardiovascular:      Normal rate. Regular rhythm.      No gallop.   Pulses:     Intact distal pulses.   Edema:     Peripheral edema absent.   Abdominal:      General: Bowel sounds are normal. There is no distension.      Palpations: Abdomen is soft.      Tenderness: There is no guarding.   Musculoskeletal: Normal range of motion.         General: No deformity.   Skin:     General: Skin is warm and dry.      Findings: No rash.   Neurological:      Mental Status: Alert and oriented to person, place, and time.      Cranial Nerves: No cranial nerve deficit.      Deep Tendon Reflexes: Reflexes are normal and symmetric.   Psychiatric:         Judgment: Judgment normal.         Review Of Data:       Assessment/Plan:         1. Paroxysmal atrial fibrillation-currently in sinus rhythm with PACs  2.  Hypertension-concern for episodes of hypotension  3.  Hypercholesterolemia-on statin  4.  CKD stage II-monitor creatinine periodically and I have reduced metoprolol dose to 25 mg daily   5.  Chronic anticoagulation-no bleeding from any site    He reports occasional dizziness which may be due to low blood pressure episodes.  I have decreased metoprolol to 25 mg p.o. daily.  Otherwise continue current care.  No significant murmurs heard and likely the noncoronary cusp thickening is probably calcium deposition.  No further testing.    Return to clinic in 1 year        Diagnosis and plan of care discussed with patient and verbalized understanding.           Yash Soliz MD  01/19/21  12:14 EST

## 2021-03-09 DIAGNOSIS — Z23 IMMUNIZATION DUE: ICD-10-CM

## 2021-06-15 ENCOUNTER — OFFICE VISIT (OUTPATIENT)
Dept: INTERNAL MEDICINE | Facility: CLINIC | Age: 76
End: 2021-06-15

## 2021-06-15 VITALS
SYSTOLIC BLOOD PRESSURE: 124 MMHG | HEIGHT: 75 IN | HEART RATE: 96 BPM | WEIGHT: 241 LBS | BODY MASS INDEX: 29.97 KG/M2 | TEMPERATURE: 97.8 F | DIASTOLIC BLOOD PRESSURE: 80 MMHG

## 2021-06-15 DIAGNOSIS — E78.00 HYPERCHOLESTEREMIA: Chronic | ICD-10-CM

## 2021-06-15 DIAGNOSIS — R73.03 PRE-DIABETES: ICD-10-CM

## 2021-06-15 DIAGNOSIS — N18.31 STAGE 3A CHRONIC KIDNEY DISEASE (HCC): ICD-10-CM

## 2021-06-15 DIAGNOSIS — I48.0 PAROXYSMAL ATRIAL FIBRILLATION (HCC): Chronic | ICD-10-CM

## 2021-06-15 DIAGNOSIS — Z12.5 SCREENING FOR PROSTATE CANCER: ICD-10-CM

## 2021-06-15 DIAGNOSIS — I10 ESSENTIAL HYPERTENSION: Chronic | ICD-10-CM

## 2021-06-15 DIAGNOSIS — Z99.89 OSA ON CPAP: ICD-10-CM

## 2021-06-15 DIAGNOSIS — Z00.00 MEDICARE ANNUAL WELLNESS VISIT, SUBSEQUENT: Primary | ICD-10-CM

## 2021-06-15 DIAGNOSIS — G47.33 OSA ON CPAP: ICD-10-CM

## 2021-06-15 PROBLEM — N28.9 RENAL INSUFFICIENCY, MILD: Status: RESOLVED | Noted: 2019-05-30 | Resolved: 2021-06-15

## 2021-06-15 PROCEDURE — 99213 OFFICE O/P EST LOW 20 MIN: CPT | Performed by: INTERNAL MEDICINE

## 2021-06-15 PROCEDURE — G0439 PPPS, SUBSEQ VISIT: HCPCS | Performed by: INTERNAL MEDICINE

## 2021-06-15 RX ORDER — PREDNISONE 1 MG/1
0.5 TABLET ORAL DAILY
COMMUNITY
End: 2022-02-16 | Stop reason: HOSPADM

## 2021-06-15 NOTE — PROGRESS NOTES
The ABCs of the Annual Wellness Visit  Subsequent Medicare Wellness Visit    Chief Complaint   Patient presents with   • Medicare Wellness-subsequent       Subjective   History of Present Illness:  Gary Grullon is a 75 y.o. male who presents for a Subsequent Medicare Wellness Visit.  He sees rheum regularly. He has found that 3 mg is the magic number that if he goes below then he hurts. He denies palpitations, chest pain or dyspnea. He plays golf and tennis. He rides his Peloton indoors. He uses his CPAP nightly. He does not feel his afib today.  HEALTH RISK ASSESSMENT    Recent Hospitalizations:  No hospitalization(s) within the last year.    Current Medical Providers:  Patient Care Team:  Rommel Wilkerson MD as PCP - General (Internal Medicine)    Smoking Status:  Social History     Tobacco Use   Smoking Status Never Smoker   Smokeless Tobacco Never Used       Alcohol Consumption:  Social History     Substance and Sexual Activity   Alcohol Use Yes    Comment: 2 glasses of wine with dinner       Depression Screen:   PHQ-2/PHQ-9 Depression Screening 6/15/2021   Little interest or pleasure in doing things 0   Feeling down, depressed, or hopeless 0   Trouble falling or staying asleep, or sleeping too much -   Feeling tired or having little energy -   Poor appetite or overeating -   Feeling bad about yourself - or that you are a failure or have let yourself or your family down -   Trouble concentrating on things, such as reading the newspaper or watching television -   Moving or speaking so slowly that other people could have noticed. Or the opposite - being so fidgety or restless that you have been moving around a lot more than usual -   Thoughts that you would be better off dead, or of hurting yourself in some way -   Total Score 0       Fall Risk Screen:  JESSYADI Fall Risk Assessment was completed, and patient is at LOW risk for falls.Assessment completed on:6/15/2021    Health Habits and Functional and Cognitive  Screening:  Functional & Cognitive Status 6/15/2021   Do you have difficulty preparing food and eating? No   Do you have difficulty bathing yourself, getting dressed or grooming yourself? No   Do you have difficulty using the toilet? No   Do you have difficulty moving around from place to place? No   Do you have trouble with steps or getting out of a bed or a chair? No   Current Diet Well Balanced Diet   Dental Exam Up to date        Dental Exam Comment -   Eye Exam Up to date   Exercise (times per week) 4 times per week   Current Exercise Activities Include Aerobics   Do you need help using the phone?  No   Are you deaf or do you have serious difficulty hearing?  No   Do you need help with transportation? No   Do you need help shopping? No   Do you need help preparing meals?  No   Do you need help with housework?  No   Do you need help with laundry? No   Do you need help taking your medications? No   Do you need help managing money? No   Do you ever drive or ride in a car without wearing a seat belt? No   Have you felt unusual stress, anger or loneliness in the last month? No   Who do you live with? Spouse   If you need help, do you have trouble finding someone available to you? No   Have you been bothered in the last four weeks by sexual problems? No   Do you have difficulty concentrating, remembering or making decisions? No         Does the patient have evidence of cognitive impairment? No    Asprin use counseling:Does not need ASA (and currently is not on it)    Age-appropriate Screening Schedule:  Refer to the list below for future screening recommendations based on patient's age, sex and/or medical conditions. Orders for these recommended tests are listed in the plan section. The patient has been provided with a written plan.    Health Maintenance   Topic Date Due   • INFLUENZA VACCINE  08/01/2021   • LIPID PANEL  05/28/2022   • TDAP/TD VACCINES (3 - Td or Tdap) 11/02/2029   • ZOSTER VACCINE  Completed           The following portions of the patient's history were reviewed and updated as appropriate: allergies, current medications, past family history, past medical history, past social history, past surgical history and problem list.    Outpatient Medications Prior to Visit   Medication Sig Dispense Refill   • apixaban (ELIQUIS) 5 MG tablet tablet Take 1 tablet by mouth Every 12 (Twelve) Hours. 180 tablet 3   • atorvastatin (LIPITOR) 20 MG tablet TAKE 1 TABLET DAILY 90 tablet 3   • cholecalciferol (VITAMIN D3) 1000 units tablet Take 1,000 Units by mouth Daily.     • metoprolol succinate XL (TOPROL-XL) 25 MG 24 hr tablet Take 1 tablet by mouth Daily. 90 tablet 3   • Omega-3 Fatty Acids (FISH OIL) 1200 MG capsule delayed-release Take 1,200 mg by mouth Daily.     • predniSONE (DELTASONE) 1 MG tablet Take 1 mg by mouth Daily. Three tabs daily     • vitamin C (ASCORBIC ACID) 500 MG tablet Take 500 mg by mouth Daily.     • predniSONE (DELTASONE) 5 MG tablet Daily. Takes 3mg every other day and 4 every other day       No facility-administered medications prior to visit.       Patient Active Problem List   Diagnosis   • Seborrheic keratosis   • Hypercholesteremia   • Essential hypertension   • Tubular adenoma of colon   • PMR (polymyalgia rheumatica) (CMS/HCC)   • HEDY on CPAP   • Pre-diabetes   • Class 1 obesity due to excess calories without serious comorbidity with body mass index (BMI) of 31.0 to 31.9 in adult   • Paroxysmal atrial fibrillation (CMS/HCC)   • Stage 3a chronic kidney disease (CMS/HCC)       Advanced Care Planning:  ACP discussion was held with the patient during this visit. Patient does not have an advance directive, information provided.    Review of Systems    Compared to one year ago, the patient feels his physical health is the same.  Compared to one year ago, the patient feels his mental health is the same.    Reviewed chart for potential of high risk medication in the elderly: yes  Reviewed chart for  "potential of harmful drug interactions in the elderly:yes    Objective         Vitals:    06/15/21 1359   BP: 124/80   Pulse: 96   Temp: 97.8 °F (36.6 °C)   Weight: 109 kg (241 lb)   Height: 189.2 cm (74.5\")       Body mass index is 30.53 kg/m².  Discussed the patient's BMI with him. The BMI is in the acceptable range.    Physical Exam  Vitals reviewed.   Constitutional:       Appearance: He is well-developed.   HENT:      Head: Normocephalic and atraumatic.   Neck:      Vascular: No carotid bruit.   Cardiovascular:      Rate and Rhythm: Normal rate. Rhythm irregularly irregular.      Heart sounds: Normal heart sounds, S1 normal and S2 normal.   Pulmonary:      Effort: Pulmonary effort is normal.      Breath sounds: Normal breath sounds.   Skin:     General: Skin is warm.   Neurological:      Mental Status: He is alert.   Psychiatric:         Behavior: Behavior normal.         Lab Results   Component Value Date     (H) 05/28/2021    CHLPL 148 05/28/2021    TRIG 82 05/28/2021    HDL 66 (H) 05/28/2021    LDL 66 05/28/2021    VLDL 16 05/28/2021    HGBA1C 5.50 05/28/2021        Assessment/Plan   Medicare Risks and Personalized Health Plan  CMS Preventative Services Quick Reference  Advance Directive Discussion    The above risks/problems have been discussed with the patient.  Pertinent information has been shared with the patient in the After Visit Summary.  Follow up plans and orders are seen below in the Assessment/Plan Section.    Diagnoses and all orders for this visit:    1. Medicare annual wellness visit, subsequent (Primary)    2. HEDY on CPAP    3. Stage 3a chronic kidney disease (CMS/HCC)  -     Comprehensive Metabolic Panel; Future  -     CBC & Differential; Future  -     Vitamin D 25 Hydroxy; Future    4. Pre-diabetes  -     Hemoglobin A1c; Future    5. Paroxysmal atrial fibrillation (CMS/HCC)    6. Essential hypertension    7. Hypercholesteremia    8. Screening for prostate cancer  -     PSA Screen; " Future      Follow Up:  Return in about 6 months (around 12/15/2021), or 30 minutes, for Lab Before FUP.     An After Visit Summary and PPPS were given to the patient.         Reviewed labs. LDL is at goal. He is in afib today and does not realize it; we discussed when he gets his Apple Watch he needs to make sure his heart rate is below 100.

## 2021-09-10 NOTE — TELEPHONE ENCOUNTER
Patient aware.  
Please kindly ask him not to continue to email Roberta his medical issues but rather call here    Stay off Lipitor until I see him in March    I do not recc take any supplements  Like curcumin, prostate health or OTC testosterone supplements.  
I have reviewed and confirmed nurses' notes for patient's medications, allergies, medical history, and surgical history.

## 2021-09-28 DIAGNOSIS — I48.19 PERSISTENT ATRIAL FIBRILLATION (HCC): ICD-10-CM

## 2021-09-28 RX ORDER — ATORVASTATIN CALCIUM 20 MG/1
TABLET, FILM COATED ORAL
Qty: 90 TABLET | Refills: 3 | Status: SHIPPED | OUTPATIENT
Start: 2021-09-28 | End: 2022-08-24

## 2021-09-28 RX ORDER — APIXABAN 5 MG/1
TABLET, FILM COATED ORAL
Qty: 180 TABLET | Refills: 3 | Status: SHIPPED | OUTPATIENT
Start: 2021-09-28 | End: 2022-08-24

## 2021-12-20 ENCOUNTER — OFFICE VISIT (OUTPATIENT)
Dept: INTERNAL MEDICINE | Facility: CLINIC | Age: 76
End: 2021-12-20

## 2021-12-20 ENCOUNTER — APPOINTMENT (OUTPATIENT)
Dept: CARDIOLOGY | Facility: HOSPITAL | Age: 76
End: 2021-12-20

## 2021-12-20 ENCOUNTER — APPOINTMENT (OUTPATIENT)
Dept: GENERAL RADIOLOGY | Facility: HOSPITAL | Age: 76
End: 2021-12-20

## 2021-12-20 ENCOUNTER — HOSPITAL ENCOUNTER (EMERGENCY)
Facility: HOSPITAL | Age: 76
Discharge: HOME | End: 2021-12-20
Attending: EMERGENCY MEDICINE | Admitting: EMERGENCY MEDICINE

## 2021-12-20 VITALS
HEIGHT: 72 IN | SYSTOLIC BLOOD PRESSURE: 116 MMHG | DIASTOLIC BLOOD PRESSURE: 82 MMHG | TEMPERATURE: 97.1 F | WEIGHT: 244 LBS | BODY MASS INDEX: 33.05 KG/M2

## 2021-12-20 VITALS
BODY MASS INDEX: 30.54 KG/M2 | DIASTOLIC BLOOD PRESSURE: 89 MMHG | SYSTOLIC BLOOD PRESSURE: 107 MMHG | RESPIRATION RATE: 18 BRPM | OXYGEN SATURATION: 97 % | HEIGHT: 74 IN | WEIGHT: 238 LBS | TEMPERATURE: 97.9 F | HEART RATE: 108 BPM

## 2021-12-20 DIAGNOSIS — I10 ESSENTIAL HYPERTENSION: Primary | Chronic | ICD-10-CM

## 2021-12-20 DIAGNOSIS — I48.91 ATRIAL FIBRILLATION WITH RVR (HCC): Primary | ICD-10-CM

## 2021-12-20 DIAGNOSIS — I48.91 ATRIAL FIBRILLATION WITH RVR (HCC): ICD-10-CM

## 2021-12-20 DIAGNOSIS — R73.03 PRE-DIABETES: ICD-10-CM

## 2021-12-20 DIAGNOSIS — Z20.822 COVID-19 RULED OUT: ICD-10-CM

## 2021-12-20 DIAGNOSIS — R97.20 INCREASED PROSTATE SPECIFIC ANTIGEN (PSA) VELOCITY: ICD-10-CM

## 2021-12-20 LAB
ALBUMIN SERPL-MCNC: 4.1 G/DL (ref 3.5–5.2)
ALBUMIN/GLOB SERPL: 1.5 G/DL
ALP SERPL-CCNC: 91 U/L (ref 39–117)
ALT SERPL W P-5'-P-CCNC: 36 U/L (ref 1–41)
ANION GAP SERPL CALCULATED.3IONS-SCNC: 11.8 MMOL/L (ref 5–15)
AST SERPL-CCNC: 31 U/L (ref 1–40)
BASOPHILS # BLD AUTO: 0.03 10*3/MM3 (ref 0–0.2)
BASOPHILS NFR BLD AUTO: 0.5 % (ref 0–1.5)
BILIRUB SERPL-MCNC: 1.3 MG/DL (ref 0–1.2)
BUN SERPL-MCNC: 20 MG/DL (ref 8–23)
BUN/CREAT SERPL: 15.4 (ref 7–25)
CALCIUM SPEC-SCNC: 9.6 MG/DL (ref 8.6–10.5)
CHLORIDE SERPL-SCNC: 107 MMOL/L (ref 98–107)
CO2 SERPL-SCNC: 22.2 MMOL/L (ref 22–29)
CREAT SERPL-MCNC: 1.3 MG/DL (ref 0.76–1.27)
DEPRECATED RDW RBC AUTO: 42 FL (ref 37–54)
EOSINOPHIL # BLD AUTO: 0.33 10*3/MM3 (ref 0–0.4)
EOSINOPHIL NFR BLD AUTO: 5.2 % (ref 0.3–6.2)
ERYTHROCYTE [DISTWIDTH] IN BLOOD BY AUTOMATED COUNT: 12.6 % (ref 12.3–15.4)
GFR SERPL CREATININE-BSD FRML MDRD: 54 ML/MIN/1.73
GLOBULIN UR ELPH-MCNC: 2.8 GM/DL
GLUCOSE SERPL-MCNC: 124 MG/DL (ref 65–99)
HCT VFR BLD AUTO: 44.8 % (ref 37.5–51)
HGB BLD-MCNC: 15.1 G/DL (ref 13–17.7)
HOLD SPECIMEN: NORMAL
HOLD SPECIMEN: NORMAL
IMM GRANULOCYTES # BLD AUTO: 0.01 10*3/MM3 (ref 0–0.05)
IMM GRANULOCYTES NFR BLD AUTO: 0.2 % (ref 0–0.5)
LYMPHOCYTES # BLD AUTO: 1.36 10*3/MM3 (ref 0.7–3.1)
LYMPHOCYTES NFR BLD AUTO: 21.6 % (ref 19.6–45.3)
MCH RBC QN AUTO: 30.8 PG (ref 26.6–33)
MCHC RBC AUTO-ENTMCNC: 33.7 G/DL (ref 31.5–35.7)
MCV RBC AUTO: 91.4 FL (ref 79–97)
MONOCYTES # BLD AUTO: 0.72 10*3/MM3 (ref 0.1–0.9)
MONOCYTES NFR BLD AUTO: 11.4 % (ref 5–12)
NEUTROPHILS NFR BLD AUTO: 3.86 10*3/MM3 (ref 1.7–7)
NEUTROPHILS NFR BLD AUTO: 61.1 % (ref 42.7–76)
NRBC BLD AUTO-RTO: 0 /100 WBC (ref 0–0.2)
PLATELET # BLD AUTO: 176 10*3/MM3 (ref 140–450)
PMV BLD AUTO: 11.3 FL (ref 6–12)
POTASSIUM SERPL-SCNC: 4 MMOL/L (ref 3.5–5.2)
PROT SERPL-MCNC: 6.9 G/DL (ref 6–8.5)
QT INTERVAL: 312 MS
RBC # BLD AUTO: 4.9 10*6/MM3 (ref 4.14–5.8)
SODIUM SERPL-SCNC: 141 MMOL/L (ref 136–145)
T4 FREE SERPL-MCNC: 1.25 NG/DL (ref 0.93–1.7)
TROPONIN T SERPL-MCNC: <0.01 NG/ML (ref 0–0.03)
TSH SERPL DL<=0.05 MIU/L-ACNC: 3.41 UIU/ML (ref 0.27–4.2)
WBC NRBC COR # BLD: 6.31 10*3/MM3 (ref 3.4–10.8)
WHOLE BLOOD HOLD SPECIMEN: NORMAL
WHOLE BLOOD HOLD SPECIMEN: NORMAL

## 2021-12-20 PROCEDURE — 93010 ELECTROCARDIOGRAM REPORT: CPT | Performed by: INTERNAL MEDICINE

## 2021-12-20 PROCEDURE — 93246 EXT ECG>7D<15D RECORDING: CPT

## 2021-12-20 PROCEDURE — 93005 ELECTROCARDIOGRAM TRACING: CPT | Performed by: EMERGENCY MEDICINE

## 2021-12-20 PROCEDURE — 71046 X-RAY EXAM CHEST 2 VIEWS: CPT

## 2021-12-20 PROCEDURE — 80053 COMPREHEN METABOLIC PANEL: CPT | Performed by: EMERGENCY MEDICINE

## 2021-12-20 PROCEDURE — 99214 OFFICE O/P EST MOD 30 MIN: CPT | Performed by: INTERNAL MEDICINE

## 2021-12-20 PROCEDURE — 93242 EXT ECG>48HR<7D RECORDING: CPT

## 2021-12-20 PROCEDURE — 84439 ASSAY OF FREE THYROXINE: CPT | Performed by: PHYSICIAN ASSISTANT

## 2021-12-20 PROCEDURE — 99284 EMERGENCY DEPT VISIT MOD MDM: CPT

## 2021-12-20 PROCEDURE — 84484 ASSAY OF TROPONIN QUANT: CPT | Performed by: EMERGENCY MEDICINE

## 2021-12-20 PROCEDURE — 36415 COLL VENOUS BLD VENIPUNCTURE: CPT

## 2021-12-20 PROCEDURE — 93000 ELECTROCARDIOGRAM COMPLETE: CPT | Performed by: INTERNAL MEDICINE

## 2021-12-20 PROCEDURE — 93005 ELECTROCARDIOGRAM TRACING: CPT

## 2021-12-20 PROCEDURE — 96374 THER/PROPH/DIAG INJ IV PUSH: CPT

## 2021-12-20 PROCEDURE — 85025 COMPLETE CBC W/AUTO DIFF WBC: CPT | Performed by: EMERGENCY MEDICINE

## 2021-12-20 PROCEDURE — 84443 ASSAY THYROID STIM HORMONE: CPT | Performed by: PHYSICIAN ASSISTANT

## 2021-12-20 PROCEDURE — 96375 TX/PRO/DX INJ NEW DRUG ADDON: CPT

## 2021-12-20 RX ORDER — SODIUM CHLORIDE 0.9 % (FLUSH) 0.9 %
10 SYRINGE (ML) INJECTION AS NEEDED
Status: DISCONTINUED | OUTPATIENT
Start: 2021-12-20 | End: 2021-12-20 | Stop reason: HOSPADM

## 2021-12-20 RX ADMIN — METOPROLOL TARTRATE 25 MG: 25 TABLET, FILM COATED ORAL at 12:25

## 2021-12-20 RX ADMIN — METOPROLOL TARTRATE 5 MG: 5 INJECTION INTRAVENOUS at 11:32

## 2021-12-20 RX ADMIN — METOPROLOL TARTRATE 5 MG: 5 INJECTION INTRAVENOUS at 10:55

## 2021-12-20 NOTE — ED PROVIDER NOTES
Jourdan supervised care provided by the midlevel provider.   We have discussed this patient's history, physical exam, and treatment plan.  I have reviewed the note and personally saw and examined the patient and agree with the plan of care.   I have seen and examined this gentleman.  He was sent here by his primary care physician of MERRITT sol and GORGE.  Patient had a heart rate about 150s.  Patient has a long history of atrial fibrillation.  He is on metoprolol.  Patient went to see his primary care physician for a routine checkup.  When he found that his heart rate was elevated he was referred here to the emergency department.  He is asymptomatic.  He denies any chest pain, shortness of breath, or does not feel that his heart rate is beating extra fast to him.  Denies any weakness with standing or walking.  He has been compliant with his medicines which include Eliquis as mentioned above.  His last dose of Eliquis was this morning.    GENERAL: not distressed  HENT: nares patent  Head/neck/ face are symmetric without gross deformity or swelling  EYES: no scleral icterus  CV: regular rhythm, regular rate with intact distal pulses  RESPIRATORY: normal effort and no respiratory distress  ABDOMEN: soft and nontender  MUSCULOSKELETAL: no deformity  NEURO: alert and appropriate, moves all extremities, follows commands  SKIN: warm, dry    Vital signs and nursing notes reviewed.    Plan when I see him his heart rate varies from 10 5-1 20 it is atrial for by the monitor.  He is already received 2 doses of Lopressor.  P.o. Lopressor has been ordered.  Were trying to verify his oral dose of metoprolol.  I reviewed his EKG and initial lab work is unremarkable.  At this time my plan is to try to get his heart rate controlled and then will adjust his medicine and discharge after conversation with cardiology.  He is asymptomatic with this.  All questions answered.  Patient agrees with this plan.    EKG reading  EKG was done at 1024  Is a  rate of 131 and it is atrial fibrillation  It is narrow complex with borderline left axis deviation  I do not appreciate any acute injury pattern.  QT looks normal  No old EKG to compare with other than the EKG that he had at his primary care doctor's office at 902.  Had similar appearance with a faster heart rate of 142.        We are currently under a pandemic from the COVID19 infection.  The patient presented to the emergency department by ambulance or personal vehicle. I followed the current protocols required by Infection Control at [facility] in my evaluation and treatment of the patient. The patient was wearing a face mask during my evaluation and throughout my encounter. During my whole encounter with this patient I used appropriate personal protective equipment.  This equipment consisted of eye protection, facemask, gown, and gloves.  I applied this equipment before entering the room.    ED Course as of 12/20/21 2011   Mon Dec 20, 2021   1128 Patient has received 1 dose of Lopressor.  Current heart rate is 137.  Will give a second dose of Lopressor. [AR]   1212 Patient's current HR is 115. [AR]   1252 Creatinine(!): 1.30  No significant change in looking at old creatinines. [MM]   1252 I looked at the chest x-ray and is unremarkable.  Also reviewed the radiologist report. [MM]   1323 Patient is remained asymptomatic in the emergency department.  He is taking 25 XR a day.  Not 50 mg of metoprolol a day.He plays pickle ball and tennis usually a few times a week and he is asymptomatic.  He has no chest pain or shortness of breath.  We consulted his cardiologist and will change his medicines to 25 mg of Lopressor twice daily.  He has been compliant with his Eliquis and will check a Holter and he will be discharged home. [MM]   1327 I discussed the patient's care with Dr. Soliz, his cardiologist.  He recommends switching the patient to short acting metoprolol 25 mg twice daily in place in a  48 hour holter monitor on him prior to discharge home. [AR]      ED Course User Index  [AR] Teresa Villarreal PA  [MM] Americo Santiago MD Molinari, Mark, MD  12/20/21 2011

## 2021-12-20 NOTE — ED PROVIDER NOTES
EMERGENCY DEPARTMENT ENCOUNTER    Room Number: 23/23  Date seen: 12/20/2021  Time seen: 11:28 EST  PCP: Rommel Wilkerson MD    Spoken Language:  English  Language interpretation services not needed     CHIEF COMPLAINT: rapid heart rate    Cardiologist: Yash Irizarry MD    HPI: Gary Grullon is a 76 y.o. male with PMH of atrial fibrillation (anticoagulated on Eliquis) presenting to the ED for evaluation of a rapid heart rate. The history is being obtained by the patient and by review of the medical chart.  The patient states that he was at a routine office visit with his primary care provider today when his primary care provider noted that his heart rate was elevated.  For this reason, his primary care provider ordered an EKG.  He was noted to be in atrial fibrillation with rapid ventricular response.  The patient was then sent to the emergency department for further evaluation.  The patient states that he does commonly feel light-headed, particularly in the mornings when he wakes up.  He states that this has been going on for several months.  He states that this typically resolves after he gets up and gets going.  The patient denies chest pain or shortness of breath.  He denies currently feeling lightheaded.  He states that he has been compliant with his Eliquis.  The patient states that he is scheduled to see his cardiologist in January.    MEDICAL RECORD REVIEW:  Reviewed in EPIC.  The patient most recently saw his cardiologist in January 2021.  At that visit, the patient was noted to be hypotensive and his dose of metoprolol XR was decreased from 50 mg to 25 mg daily.    PAST MEDICAL HISTORY  Past Medical History:   Diagnosis Date   • Fracture of elbow    • HEDY (obstructive sleep apnea)     AHI 15/h supine 49/h       PAST SURGICAL HISTORY  Past Surgical History:   Procedure Laterality Date   • CATARACT EXTRACTION     • COLONOS[facility]  [facility]04/2018    [facility]   • CYST REMOVAL  1970   •  EYE SURGERY     • PILONIDAL CYST DRAINAGE         FAMILY HISTORY  Family History   Problem Relation Age of Onset   • COPD Mother    • Hypertension Father    • Diabetes Father    • Stroke Father    • Coronary artery disease Father    • Kidney disease Sister    • Diabetes Brother    • No Known Problems Maternal Aunt    • No Known Problems Maternal Uncle    • No Known Problems Paternal Aunt    • No Known Problems Paternal Uncle    • No Known Problems Maternal Grandmother    • No Known Problems Maternal Grandfather    • No Known Problems Paternal Grandmother    • No Known Problems Paternal Grandfather    • Anesthesia problems Neg Hx    • Broken bones Neg Hx    • Cancer Neg Hx    • Clotting disorder Neg Hx    • Collagen disease Neg Hx    • Dislocations Neg Hx    • Osteoporosis Neg Hx    • Rheumatologic disease Neg Hx    • Scoliosis Neg Hx    • Severe sprains Neg Hx        SOCIAL HISTORY  Social History     Socioeconomic History   • Marital status:    Tobacco Use   • Smoking status: Never Smoker   • Smokeless tobacco: Never Used   Substance and Sexual Activity   • Alcohol use: Yes     Comment: 2 glasses of wine with dinner   • Drug use: No   • Sexual activity: Defer       CURRENT MEDICATIONS  Prior to Admission medications    Medication Sig Start Date End Date Taking? Authorizing Provider   atorvastatin (LIPITOR) 20 MG tablet TAKE 1 TABLET DAILY 9/28/21   Rommel Wilkerson MD   cholecalciferol (VITAMIN D3) 1000 units tablet Take 1,000 Units by mouth Daily.    Provider, MD Demetrio   Eliquis 5 MG tablet tablet TAKE 1 TABLET EVERY 12 HOURS 9/28/21   Rommel Wilkerson MD   metoprolol succinate XL (TOPROL-XL) 25 MG 24 hr tablet Take 1 tablet by mouth Daily. 1/19/21   Yash Soliz MD   Omega-3 Fatty Acids (FISH OIL) 1200 MG capsule delayed-release Take 1,200 mg by mouth Daily.    Provider, MD Demetrio   predniSONE (DELTASONE) 1 MG tablet Take 0.5 mg by mouth Daily. Three tabs daily    Provider,  MD Demetrio   vitamin C (ASCORBIC ACID) 500 MG tablet Take 500 mg by mouth Daily.    Provider, MD Demetrio       ALLERGIES  Patient has no known allergies.    REVIEW OF SYSTEMS  All systems reviewed and negative except for those discussed in HPI.     PHYSICAL EXAM  ED Triage Vitals   Temp Heart Rate Resp BP SpO2   12/20/21 1019 12/20/21 1019 12/20/21 1019 12/20/21 1036 12/20/21 1019   97.9 °F (36.6 °C) (!) 130 16 (!) 140/105 97 %      Temp src Heart Rate Source Patient Position BP Location FiO2 (%)   12/20/21 1019 12/20/21 1019 -- -- --   Tympanic Monitor          Physical Exam  Constitutional:       Appearance: Normal appearance.   HENT:      Head: Normocephalic and atraumatic.      Mouth/Throat:      Mouth: Mucous membranes are moist.   Eyes:      Extraocular Movements: Extraocular movements intact.      Pupils: Pupils are equal, round, and reactive to light.   Cardiovascular:      Rate and Rhythm: Tachycardia present. Rhythm irregular.   Pulmonary:      Effort: Pulmonary effort is normal.      Breath sounds: Normal breath sounds.   Abdominal:      General: There is no distension.      Palpations: Abdomen is soft.      Tenderness: There is no abdominal tenderness.   Musculoskeletal:      Cervical back: Normal range of motion.   Skin:     General: Skin is warm and dry.   Neurological:      General: No focal deficit present.      Mental Status: He is alert and oriented to person, place, and time.   Psychiatric:         Mood and Affect: Mood normal.         Behavior: Behavior normal.         Thought Content: Thought content normal.         Judgment: Judgment normal.         PROCEDURES  Procedures  None    LABS  Recent Results (from the past 24 hour(s))   ECG 12 Lead    Collection Time: 12/20/21 10:24 AM   Result Value Ref Range    QT Interval 312 ms   Comprehensive Metabolic Panel    Collection Time: 12/20/21 10:55 AM    Specimen: Blood   Result Value Ref Range    Glucose 124 (H) 65 - 99 mg/dL    BUN 20 8 - 23  mg/dL    Creatinine 1.30 (H) 0.76 - 1.27 mg/dL    Sodium 141 136 - 145 mmol/L    Potassium 4.0 3.5 - 5.2 mmol/L    Chloride 107 98 - 107 mmol/L    CO2 22.2 22.0 - 29.0 mmol/L    Calcium 9.6 8.6 - 10.5 mg/dL    Total Protein 6.9 6.0 - 8.5 g/dL    Albumin 4.10 3.50 - 5.20 g/dL    ALT (SGPT) 36 1 - 41 U/L    AST (SGOT) 31 1 - 40 U/L    Alkaline Phosphatase 91 39 - 117 U/L    Total Bilirubin 1.3 (H) 0.0 - 1.2 mg/dL    eGFR Non African Amer 54 (L) >60 mL/min/1.73    Globulin 2.8 gm/dL    A/G Ratio 1.5 g/dL    BUN/Creatinine Ratio 15.4 7.0 - 25.0    Anion Gap 11.8 5.0 - 15.0 mmol/L   Troponin    Collection Time: 12/20/21 10:55 AM    Specimen: Blood   Result Value Ref Range    Troponin T <0.010 0.000 - 0.030 ng/mL   Green Top (Gel)    Collection Time: 12/20/21 10:55 AM   Result Value Ref Range    Extra Tube Hold for add-ons.    Lavender Top    Collection Time: 12/20/21 10:55 AM   Result Value Ref Range    Extra Tube hold for add-on    Gold Top - SST    Collection Time: 12/20/21 10:55 AM   Result Value Ref Range    Extra Tube Hold for add-ons.    Light Blue Top    Collection Time: 12/20/21 10:55 AM   Result Value Ref Range    Extra Tube hold for add-on    CBC Auto Differential    Collection Time: 12/20/21 10:55 AM    Specimen: Blood   Result Value Ref Range    WBC 6.31 3.40 - 10.80 10*3/mm3    RBC 4.90 4.14 - 5.80 10*6/mm3    Hemoglobin 15.1 13.0 - 17.7 g/dL    Hematocrit 44.8 37.5 - 51.0 %    MCV 91.4 79.0 - 97.0 fL    MCH 30.8 26.6 - 33.0 pg    MCHC 33.7 31.5 - 35.7 g/dL    RDW 12.6 12.3 - 15.4 %    RDW-SD 42.0 37.0 - 54.0 fl    MPV 11.3 6.0 - 12.0 fL    Platelets 176 140 - 450 10*3/mm3    Neutrophil % 61.1 42.7 - 76.0 %    Lymphocyte % 21.6 19.6 - 45.3 %    Monocyte % 11.4 5.0 - 12.0 %    Eosinophil % 5.2 0.3 - 6.2 %    Basophil % 0.5 0.0 - 1.5 %    Immature Grans % 0.2 0.0 - 0.5 %    Neutrophils, Absolute 3.86 1.70 - 7.00 10*3/mm3    Lymphocytes, Absolute 1.36 0.70 - 3.10 10*3/mm3    Monocytes, Absolute 0.72 0.10 - 0.90  10*3/mm3    Eosinophils, Absolute 0.33 0.00 - 0.40 10*3/mm3    Basophils, Absolute 0.03 0.00 - 0.20 10*3/mm3    Immature Grans, Absolute 0.01 0.00 - 0.05 10*3/mm3    nRBC 0.0 0.0 - 0.2 /100 WBC   T4, Free    Collection Time: 12/20/21 10:55 AM    Specimen: Blood   Result Value Ref Range    Free T4 1.25 0.93 - 1.70 ng/dL   TSH    Collection Time: 12/20/21 10:55 AM    Specimen: Blood   Result Value Ref Range    TSH 3.410 0.270 - 4.200 uIU/mL   Holter Monitor - 72 Hour Up To 15 Days    Collection Time: 12/20/21  1:44 PM   Result Value Ref Range    Target HR (85%) 123 bpm    Max. Pred. HR (100%) 145 bpm       RADIOLOGY  XR Chest 2 View   Final Result          MEDICATIONS GIVEN IN THE ER  Medications   sodium chloride 0.9 % flush 10 mL (has no administration in time range)   metoprolol tartrate (LOPRESSOR) injection 5 mg (5 mg Intravenous Given 12/20/21 1132)   metoprolol tartrate (LOPRESSOR) injection 5 mg (5 mg Intravenous Given 12/20/21 1055)   metoprolol tartrate (LOPRESSOR) tablet 25 mg (25 mg Oral Given 12/20/21 1225)       MEDICAL DECISION MAKING, CONSULTS AND PROGRESS NOTES  All labs and all radiology studies were have been viewed and interpreted by me.   Discussion below represents my analysis of pertinent findings related to patient's condition, differential diagnosis, treatment plan and final disposition.    Differential diagnosis includes but is not limited to:  - a fib with RVR  - sinus tachycardia  - v tach    PPE: The patient was placed in a face mask in first look. Patient was wearing facemask when I entered the room and throughout our encounter. I wore full protective equipment throughout this patient encounter including a face mask, eye protection and gloves. Hand hygiene was performed before donning protective equipment and after removal when leaving the room.    AS OF 13:46 EST VITALS:    BP - 103/90  HR - 102  TEMP - 97.9 °F (36.6 °C) (Tympanic)  O2 SATS - 97%    ED Course as of 12/20/21 1346   Mon  Dec 20, 2021   1128 Patient has received 1 dose of Lopressor.  Current heart rate is 137.  Will give a second dose of Lopressor. [AR]   1212 Patient's current HR is 115. [AR]   1252 Creatinine(!): 1.30  No significant change in looking at old creatinines. [MM]   1252 I looked at the chest x-ray and is unremarkable.  Also reviewed the radiologist report. [MM]   1323 Patient is remained asymptomatic in the emergency department.  He is taking 25 XR a day.  Not 50 mg of metoprolol a day.He plays pickle ball and tennis usually a few times a week and he is asymptomatic.  He has no chest pain or shortness of breath.  We consulted his cardiologist and will change his medicines to 25 mg of Lopressor twice daily.  He has been compliant with his Eliquis and will check a Holter and he will be discharged home. [MM]   1327 I discussed the patient's care with Dr. Soliz, his cardiologist.  He recommends switching the patient to short acting metoprolol 25 mg twice daily in place in a 48 hour holter monitor on him prior to discharge home. [AR]      ED Course User Index  [AR] Teresa Villarreal PA  [MM] Americo Santiago MD     The patient's history, physical exam, and lab findings were discussed with the physician, who also performed a face to face history and physical exam.  I discussed all results and noted any abnormalities with patient.  Discussed absoute need to recheck abnormalities with their family physician.  I answered any of the patient's questions.  Discussed plan for discharge, as there is no emergent indication for admission.  Pt is agreeable and understands need for follow up and repeat testing.  Pt is aware that discharge does not mean that nothing is wrong but it indicates no emergency is present and they must continue care with their family physician.  Pt is discharged with instructions to follow up with primary care doctor to have their blood pressure rechecked.     DIAGNOSIS   Diagnosis Plan   1. Atrial  fibrillation with RVR (HCC)         DISPOSITION  ED Disposition     ED Disposition Condition Comment    Discharge Stable           FOLLOW UP  Yash Soliz MD  [address]  [facility] [address]  [place] [address]  [phone]      Keep your upcoming appointment as scheduled, or call him sooner if you have any issues after discharge      RX  Medications   sodium chloride 0.9 % flush 10 mL (has no administration in time range)   metoprolol tartrate (LOPRESSOR) injection 5 mg (5 mg Intravenous Given 12/20/21 1132)   metoprolol tartrate (LOPRESSOR) injection 5 mg (5 mg Intravenous Given 12/20/21 1055)   metoprolol tartrate (LOPRESSOR) tablet 25 mg (25 mg Oral Given 12/20/21 1225)          Medication List      New Prescriptions    metoprolol tartrate 25 MG tablet  Commonly known as: LOPRESSOR  Take 1 tablet by mouth 2 (Two) Times a Day.        Stop    metoprolol succinate XL 25 MG 24 hr tablet  Commonly known as: TOPROL-XL           Where to Get Your Medications      These medications were sent to [facility] - [address] [facility]. [address] - [phone]  - [phone]   [address]  [address], [place] [address]    Phone: [phone]   · metoprolol tartrate 25 MG tablet       Provider Attestation:  I personally reviewed the past medical history, past surgical history, social history, family history, current medications and allergies as they appear in the chart. I reviewed the patient's history, physical, lab/imaging results and overall care with Dr. Santiago who is in agreement with the patient's treatment plan.    EMR Dragon/Transcription disclaimer:  Some of this encounter note is an electronic transcription/translation of spoken language to printed text. The electronic translation of spoken language may permit erroneous, or at times, nonsensical words or phrases to be inadvertently transcribed; Although I have reviewed the note for such errors, some may  still exist.    Provider note signed by:         Teresa Villarreal PA  12/20/21 0603

## 2021-12-20 NOTE — PROGRESS NOTES
Subjective        Chief Complaint   Patient presents with   • Hypertension           Gary Grullon is a 76 y.o. male who presents for    Patient Active Problem List   Diagnosis   • Seborrheic keratosis   • Hypercholesteremia   • Essential hypertension   • Tubular adenoma of colon   • PMR (polymyalgia rheumatica) (HCC)   • HEDY on CPAP   • Pre-diabetes   • Class 1 obesity due to excess calories without serious comorbidity with body mass index (BMI) of 31.0 to 31.9 in adult   • Paroxysmal atrial fibrillation (HCC)   • Stage 3a chronic kidney disease (HCC)       History of Present Illness     He checks his BP infrequently. He denies chest pain or dyspnea. He is playing tennis and pickle ball.  He feels better when he exercises. He has weaned his prednisone to 0.5 mg daily. He has no FH of prostate CA. He has no issues with urinating. He has nocturia with 1-2 episodes per night. He does not feel any skipped heart beats today and he has no presyncope.  No Known Allergies    No current facility-administered medications on file prior to visit.     Current Outpatient Medications on File Prior to Visit   Medication Sig Dispense Refill   • atorvastatin (LIPITOR) 20 MG tablet TAKE 1 TABLET DAILY 90 tablet 3   • cholecalciferol (VITAMIN D3) 1000 units tablet Take 1,000 Units by mouth Daily.     • Eliquis 5 MG tablet tablet TAKE 1 TABLET EVERY 12 HOURS 180 tablet 3   • metoprolol succinate XL (TOPROL-XL) 25 MG 24 hr tablet Take 1 tablet by mouth Daily. 90 tablet 3   • Omega-3 Fatty Acids (FISH OIL) 1200 MG capsule delayed-release Take 1,200 mg by mouth Daily.     • predniSONE (DELTASONE) 1 MG tablet Take 0.5 mg by mouth Daily. Three tabs daily     • vitamin C (ASCORBIC ACID) 500 MG tablet Take 500 mg by mouth Daily.         Past Medical History:   Diagnosis Date   • Fracture of elbow    • HEDY (obstructive sleep apnea)     AHI 15/h supine 49/h       Past Surgical History:   Procedure Laterality Date   • CATARACT EXTRACTION     •  COLONOSCOPY  04/04/2018    DR Gary Guerrero [facility]   • CYST REMOVAL  1970   • EYE SURGERY     • PILONIDAL CYST DRAINAGE         Family History   Problem Relation Age of Onset   • COPD Mother    • Hypertension Father    • Diabetes Father    • Stroke Father    • Coronary artery disease Father    • Kidney disease Sister    • Diabetes Brother    • No Known Problems Maternal Aunt    • No Known Problems Maternal Uncle    • No Known Problems Paternal Aunt    • No Known Problems Paternal Uncle    • No Known Problems Maternal Grandmother    • No Known Problems Maternal Grandfather    • No Known Problems Paternal Grandmother    • No Known Problems Paternal Grandfather    • Anesthesia problems Neg Hx    • Broken bones Neg Hx    • Cancer Neg Hx    • Clotting disorder Neg Hx    • Collagen disease Neg Hx    • Dislocations Neg Hx    • Osteoporosis Neg Hx    • Rheumatologic disease Neg Hx    • Scoliosis Neg Hx    • Severe sprains Neg Hx        Social History     Socioeconomic History   • Marital status:    Tobacco Use   • Smoking status: Never Smoker   • Smokeless tobacco: Never Used   Substance and Sexual Activity   • Alcohol use: Yes     Comment: 2 glasses of wine with dinner   • Drug use: No   • Sexual activity: Defer           The following portions of the patient's history were reviewed and updated as appropriate: problem list, allergies, current medications, past medical history, past family history, past social history and past surgical history.    Review of Systems    Immunization History   Administered Date(s) Administered   • COVID-19 (MODERNA) 1st, 2nd, 3rd Dose Only 01/13/2021, 02/10/2021, 08/25/2021   • Fluad Quad 65+ 09/13/2019, 10/10/2021   • Fluzone High Dose =>65 Years ([employer] ONLY) 09/01/2016, 09/06/2018, 09/01/2020   • Hepatitis A 03/21/2006, 10/25/2011   • Hepatitis B 03/21/2006, 07/08/2007, 02/02/2012   • MMR 03/13/2008   • Meningococcal, Unspecified 01/14/2013   • Pneumococcal Conjugate  "13-Valent (PCV13) 08/31/2016   • Pneumococcal Polysaccharide (PPSV23) 12/10/2011   • Polio, Unspecified 01/15/2003   • Shingrix 08/12/2019, 06/19/2020   • Tdap 01/01/2012, 11/02/2019   • Typhoid, Unspecified 01/14/2013   • Yellow Fever 01/14/2013   • Zostavax 10/19/2012, 08/12/2019       Objective   Vitals:    12/20/21 0916   BP: 116/82   Temp: 97.1 °F (36.2 °C)   Weight: 111 kg (244 lb)   Height: 182.9 cm (72.01\")     Body mass index is 33.08 kg/m².  Physical Exam  Vitals reviewed.   Constitutional:       Appearance: He is well-developed.   HENT:      Head: Normocephalic and atraumatic.   Cardiovascular:      Rate and Rhythm: Normal rate. Rhythm irregularly irregular.      Heart sounds: Normal heart sounds, S1 normal and S2 normal.   Pulmonary:      Effort: Pulmonary effort is normal.      Breath sounds: Normal breath sounds.   Skin:     General: Skin is warm.   Neurological:      Mental Status: He is alert.   Psychiatric:         Behavior: Behavior normal.           ECG 12 Lead    Date/Time: 12/20/2021 10:34 AM  Performed by: Rommel Wilkerson MD  Authorized by: Rommel Wilkerson MD   Comparison: compared with previous ECG from 1/19/2021  Comparison to previous ECG: Now in afib with RVR  Rhythm: atrial fibrillation  Rate: tachycardic    Clinical impression: abnormal EKG  Comments: Afib with RVR            Assessment/Plan   Diagnoses and all orders for this visit:    1. Essential hypertension (Primary)    2. Pre-diabetes  -     Comprehensive Metabolic Panel; Future  -     Hemoglobin A1c; Future    3. Increased prostate specific antigen (PSA) velocity  -     Cancel: PSA DIAGNOSTIC ONLY; Future  -     PSA DIAGNOSTIC ONLY; Future    4. Atrial fibrillation with RVR (HCC)  -     ECG 12 Lead    5. COVID-19 ruled out  -     COVID-19,[facility] ROUTINE, NP/OP SWAB IN TRANSPORT MEDIA OR ESWAB 72 HR TAT - Swab, Nasopharynx               Reviewed cmp, psa and a1c. Discussed implications of PSA; we will repeat in 3 months. " Check COVID at his request with family coming to town. Given his afib with RVR, I am going to get him to the ER.   Return in about 3 months (around 3/20/2022), or 30 minutes, for Lab Before FUP.

## 2021-12-20 NOTE — DISCHARGE INSTRUCTIONS
Although you are being discharged from the ED today, I encourage you to return for worsening symptoms. Things can, and do, change such that treatment at home with medication may not be adequate. Specifically I recommend returning for chest pain or discomfort, difficulty breathing, persistent vomiting or difficulty holding down liquids or medications, fever > 102.0 F or any other worsening or alarming symptoms.     Stop taking your metoprolol XL.  Start taking the metoprolol prescription sent to your pharmacy twice daily.    Rest. Drink plenty of fluids.  Follow up with Dr. Soliz for further evaluation and management.  Follow up with primary care provider for further management and to have blood pressure rechecked.  Take your medications as prescribed.  Do not drive, work, operate heavy machinery, or otherwise engage in any risky behavior while on narcotic pain medications or other sedating drugs. Do not sign any legal paperwork within 24 hours of taking narcotic pain medications or other sedating drugs. These medications may impair judgement and/or motor capabilities.

## 2021-12-20 NOTE — ED TRIAGE NOTES
Pt was at pmd.  He had an abnormal ekg - he had a rapid     Patient was placed in face mask during first look triage.  Patient was wearing a face mask throughout encounter.  I wore personal protective equipment throughout the encounter.  Hand hygiene was performed before and after patient encounter.

## 2021-12-21 LAB
LABCORP SARS-COV-2, NAA 2 DAY TAT: NORMAL
SARS-COV-2 RNA RESP QL NAA+PROBE: NOT DETECTED

## 2021-12-31 LAB
MAXIMAL PREDICTED HEART RATE: 145 BPM
STRESS TARGET HR: 123 BPM

## 2021-12-31 PROCEDURE — 93244 EXT ECG>48HR<7D REV&INTERPJ: CPT | Performed by: INTERNAL MEDICINE

## 2022-01-03 RX ORDER — METOPROLOL TARTRATE 50 MG/1
50 TABLET, FILM COATED ORAL 2 TIMES DAILY
Start: 2022-01-03 | End: 2022-01-12

## 2022-01-03 NOTE — PROGRESS NOTES
I have called and discussed results of Holter. He denies any symptoms but during random check of HR he noted varying HR and checked while on the phone and was 129. Denies any palp/presyncope/syncope/CHF symptoms. I have increased metoprolol to 40mg twice daily and will call clinic in 5 days.

## 2022-01-06 ENCOUNTER — OFFICE VISIT (OUTPATIENT)
Dept: SLEEP MEDICINE | Facility: HOSPITAL | Age: 77
End: 2022-01-06

## 2022-01-06 ENCOUNTER — TELEPHONE (OUTPATIENT)
Dept: SLEEP MEDICINE | Facility: HOSPITAL | Age: 77
End: 2022-01-06

## 2022-01-06 VITALS
HEIGHT: 74 IN | DIASTOLIC BLOOD PRESSURE: 80 MMHG | BODY MASS INDEX: 30.93 KG/M2 | HEART RATE: 104 BPM | SYSTOLIC BLOOD PRESSURE: 130 MMHG | OXYGEN SATURATION: 98 % | WEIGHT: 241 LBS

## 2022-01-06 DIAGNOSIS — Z99.89 OSA ON CPAP: Primary | ICD-10-CM

## 2022-01-06 DIAGNOSIS — G47.33 OSA ON CPAP: Primary | ICD-10-CM

## 2022-01-06 DIAGNOSIS — E66.09 CLASS 1 OBESITY DUE TO EXCESS CALORIES WITHOUT SERIOUS COMORBIDITY WITH BODY MASS INDEX (BMI) OF 31.0 TO 31.9 IN ADULT: ICD-10-CM

## 2022-01-06 PROCEDURE — G0463 HOSPITAL OUTPT CLINIC VISIT: HCPCS

## 2022-01-06 PROCEDURE — 99213 OFFICE O/P EST LOW 20 MIN: CPT | Performed by: INTERNAL MEDICINE

## 2022-01-06 NOTE — PROGRESS NOTES
"  [facility]  [address]  3rd [place] [address]  Phone   Fax       SLEEP CLINIC FOLLOW UP PROGRESS NOTE.    Gary Grullon  1945  76 y.o.  male      PCP: Rommel Wilkerson MD      Date of visit: 1/6/2022    Chief Complaint   Patient presents with   • Sleep Apnea   • Obesity       HPI:  This is a 76 y.o. years old patient who has a history of obstructive sleep apnea is here for  the annual compliance follow-up.  Sleep apnea is moderate in severity with a AHI of 15/hr. Patient is using positive airway pressure therapy with auto CPAP and the symptoms of snoring, non-restorative sleep and daytime excessive sleepiness have improved significantly on the therapy. Normally goes to bed at 11 PM and wakes up at 8 AM.  The patient wakes up 2 time(s) during the night and has no problem going back to sleep.  Feels refreshed after waking up.  Patient also denies headaches and nasal congestion.       Medications and allergies are reviewed by me and documented in the encounter.     SOCIAL ( habits pertaining to sleep medicine)  History tobacco use:No   History of alcohol use: 6 per week  Caffeine use: 2     REVIEW OF SYSTEMS:   [place] Sleepiness Scale :Total score: 5   Nasal congestion:No   Dry mouth/nose:Yes   Post nasal drip; No   Acid reflux/Heartburn:No   Abd bloating:No   Morning headache:No   Anxiety:No   Depression:No    PHYSICAL EXAMINATION:  CONSTITUTIONAL:  Vitals:    01/06/22 0848 01/06/22 0849   BP:  130/80   Pulse:  104   SpO2:  98%   Weight: 110 kg (242 lb) 109 kg (241 lb)   Height: 188 cm (74\") 188 cm (74\")    Body mass index is 30.94 kg/m².   NOSE: nasal passages are clear, no nasal polyps, septum in the midline.  THROAT: throat is clear, oral airway Mallampati class 3  RESP SYSTEM: Breath sounds are normal, no wheezes or crackles  CARDIOVASULAR: Heart rate is regular without murmur. No edema      Data reviewed:  The Smart card downloaded on 1/6/2022 has " been reviewed independently by me for compliance and discussed the data with the patient.   Compliance; 97%  More than 4 hr use, 77%  Average use of the device 5 hours and 34-minute per night  Residual AHI: 5 /hr (goal < 5.0 /hr)  Mask type: Nasal pillows  DME: [facility]/[employer]      ASSESSMENT AND PLAN:  · Obstructive sleep apnea ( G 47.33).  The symptoms of sleep apnea have improved with the device and the treatment.  Patient's compliance with the device is excellent for treatment of sleep apnea.  I have independently reviewed the smart card down load and discussed with the patient the download data and encouarged the patient to continue to use the device.The residual AHI is acceptable. The device is benefiting the patient and the device is medically necessary.  Without proper control of sleep apnea and good compliance there is a increased risk for hypertension, diabetes mellitus and nonrestorative sleep with hypersomnia which can increase risk for motor vehicle accidents.  Untreated sleep apnea is also a risk factor for development of atrial fibrillation, pulmonary hypertension and stroke. The patient is also instructed to get the supplies from the [employer] and and change them on a regular basis.  A prescription for supplies has been sent to the [employer].  I have also discussed the good sleep hygiene habits and adequate amount of sleep needed for good health.  · Obesity, class 1 with BMI is Body mass index is 30.94 kg/m².. I have discuss the relationship between the weight and sleep apnea. The benefit of weight loss in reducing severity of sleep apnea was discussed. Discussed diet and exercise with the patient to achieve ideal BMI.   · Return in about 1 year (around 1/6/2023) for Annual visit with smartcard download. . Patient's questions were answered.        Justin Phipps MD  Sleep Medicine.  Medical Director, [facility] sleep [facility]  1/6/2022 ,

## 2022-01-12 ENCOUNTER — OFFICE VISIT (OUTPATIENT)
Dept: CARDIOLOGY | Facility: CLINIC | Age: 77
End: 2022-01-12

## 2022-01-12 VITALS
OXYGEN SATURATION: 96 % | BODY MASS INDEX: 30.29 KG/M2 | DIASTOLIC BLOOD PRESSURE: 86 MMHG | WEIGHT: 236 LBS | SYSTOLIC BLOOD PRESSURE: 128 MMHG | HEART RATE: 141 BPM | HEIGHT: 74 IN

## 2022-01-12 DIAGNOSIS — I48.19 PERSISTENT ATRIAL FIBRILLATION WITH RVR: Primary | ICD-10-CM

## 2022-01-12 PROCEDURE — 99214 OFFICE O/P EST MOD 30 MIN: CPT | Performed by: INTERNAL MEDICINE

## 2022-01-12 PROCEDURE — 93000 ELECTROCARDIOGRAM COMPLETE: CPT | Performed by: INTERNAL MEDICINE

## 2022-01-12 RX ORDER — METOPROLOL TARTRATE 50 MG/1
50 TABLET, FILM COATED ORAL 2 TIMES DAILY
Qty: 180 TABLET | Refills: 3 | Status: SHIPPED | OUTPATIENT
Start: 2022-01-12 | End: 2022-02-01

## 2022-01-12 RX ORDER — CALCIUM CARBONATE/VITAMIN D3 600 MG-10
1 TABLET ORAL DAILY
COMMUNITY

## 2022-01-12 NOTE — PROGRESS NOTES
PATIENTINFORMATION    Date of Office Visit: 2022  Encounter Provider: Yash Soliz MD  Place of Service: [facility] CARDIOLOGY  Patient Name: Gary Grullon  : 1945    Subjective:     Encounter Date:2022      Patient ID: Gary Grullon is a 76 y.o. male.    Chief Complaint   Patient presents with   • Atrial Fibrillation     1 yr f/u - pt had recent ER visit     HPI  Mr. Grullon is a 77 yo man with medical hx paroxysmal atrial fibrillation came to cardiology clinic for fu of afib.  He has had paroxysmal atrial fibrillation known since .  He has remained in sinus rhythm for the most part but went back into A. fib last months.  He does not have significant symptoms other than noting fast heartbeat on routine checks.  Heart monitor worn last month confirmed A. fib with RVR.  He still continues to be active and plays tennis without any new symptoms other than feeling intermittently ' foggy'.  No syncope.  He is compliant with current treatment regimen including Eliquis that he takes without interruption.  Metoprolol dose was reduced in the past as he remained in sinus rhythm.  No symptoms of heart failure.  He is compliant with CPAP machine at night even if he believes it has not made significant difference.    ROS   All systems reviewed and negative except as noted in HPI.    Past Medical History:   Diagnosis Date   • Fracture of elbow    • HEDY (obstructive sleep apnea)     AHI 15/h supine 49/h       Past Surgical History:   Procedure Laterality Date   • CATARACT EXTRACTION     • [facility]   • CY[facility] REMOVAL     • EYE SURGERY     • PILONIDAL CYST DRAINAGE         Social History     Socioeconomic History   • Marital status:    Tobacco Use   • Smoking status: Never Smoker   • Smokeless tobacco: Never Used   Vaping Use   • Vaping Use: Never used   Substance and Sexual Activity   • Alcohol use: Yes     Comment: 2 glasses  "of wine with dinner   • Drug use: No   • Sexual activity: Defer       Family History   Problem Relation Age of Onset   • COPD Mother    • Hypertension Father    • Diabetes Father    • Stroke Father    • Coronary artery disease Father    • Kidney disease Sister    • Diabetes Brother    • No Known Problems Maternal Aunt    • No Known Problems Maternal Uncle    • No Known Problems Paternal Aunt    • No Known Problems Paternal Uncle    • No Known Problems Maternal Grandmother    • No Known Problems Maternal Grandfather    • No Known Problems Paternal Grandmother    • No Known Problems Paternal Grandfather    • Anesthesia problems Neg Hx    • Broken bones Neg Hx    • Cancer Neg Hx    • Clotting disorder Neg Hx    • Collagen disease Neg Hx    • Dislocations Neg Hx    • Osteoporosis Neg Hx    • Rheumatologic disease Neg Hx    • Scoliosis Neg Hx    • Severe sprains Neg Hx            ECG 12 Lead    Date/Time: 1/12/2022 4:30 PM  Performed by: Yash Soliz MD  Authorized by: Yash Soliz MD   Comparison: compared with previous ECG from 12/20/2021  Comparison to previous ECG: HR faster on this tracing   Rhythm: atrial fibrillation  Rate: normal  Conduction: conduction normal  ST Segments: ST segments normal  T Waves: T waves normal  QRS axis: normal  Other: no other findings    Clinical impression: abnormal EKG               Objective:     /86   Pulse (!) 141   Ht 188 cm (74\")   Wt 107 kg (236 lb)   SpO2 96%   BMI 30.30 kg/m²  Body mass index is 30.3 kg/m².     Constitutional:       General: Not in acute distress.     Appearance: Well-developed. Not diaphoretic.   Eyes:      Pupils: Pupils are equal, round, and reactive to light.   HENT:      Head: Normocephalic and atraumatic.   Neck:      Thyroid: No thyromegaly.   Pulmonary:      Effort: Pulmonary effort is normal. No respiratory distress.      Breath sounds: Normal breath sounds. No wheezing. No rales.   Chest:      Chest wall: Not tender to " palpatation.   Cardiovascular:      Tachycardia present. Irregularly irregular rhythm.      No gallop.   Pulses:     Intact distal pulses.   Abdominal:      General: Bowel sounds are normal. There is no distension.      Palpations: Abdomen is soft.      Tenderness: There is no guarding.   Musculoskeletal: Normal range of motion.         General: No deformity.      Cervical back: Normal range of motion and neck supple. Skin:     General: Skin is warm and dry.      Findings: No rash.   Neurological:      Mental Status: Alert and oriented to person, place, and time.      Cranial Nerves: No cranial nerve deficit.      Deep Tendon Reflexes: Reflexes are normal and symmetric.   Psychiatric:         Judgment: Judgment normal.         Review Of Data: I have reviewed recent labs and documents      Assessment/Plan:       1.  Persistent atrial fibrillation with RVR-currently in A. fib with RVR with heart rate of sinus in the 140s-no significant symptoms, normal blood pressure  I will switch him back to metoprolol to tartrate and increased frequency to 50 mg p.o. 3 times daily and he will closely monitor his blood pressure and heart rate.  His apple watch tracing was accurate during exam  He will call back tomorrow afternoon with updates.  May consider doing cardioversion if he is persistently tachycardic.  Continue Eliquis  2.  Hypertension-BP in office today was 120/86 mmHg.  3.  Hypercholesterolemia-on statin  4.  Chronic kidney disease stage II-stable  5.  Obstructive sleep apnea on CPAP    Diagnosis and plan of care discussed with patient and verbalized understanding.           Yash Soliz MD  01/12/22  16:40 EST

## 2022-01-17 ENCOUNTER — TELEPHONE (OUTPATIENT)
Dept: CARDIOLOGY | Facility: CLINIC | Age: 77
End: 2022-01-17

## 2022-01-17 ENCOUNTER — TRANSCRIBE ORDERS (OUTPATIENT)
Dept: CARDIOLOGY | Facility: CLINIC | Age: 77
End: 2022-01-17

## 2022-01-17 DIAGNOSIS — Z01.810 PREPROCEDURAL CARDIOVASCULAR EXAMINATION: ICD-10-CM

## 2022-01-17 DIAGNOSIS — Z01.818 OTHER SPECIFIED PRE-OPERATIVE EXAMINATION: ICD-10-CM

## 2022-01-17 DIAGNOSIS — Z13.6 SCREENING FOR CARDIOVASCULAR CONDITION: Primary | ICD-10-CM

## 2022-01-17 DIAGNOSIS — I48.19 PERSISTENT ATRIAL FIBRILLATION WITH RVR: Primary | ICD-10-CM

## 2022-01-18 ENCOUNTER — LAB (OUTPATIENT)
Dept: LAB | Facility: HOSPITAL | Age: 77
End: 2022-01-18

## 2022-01-18 DIAGNOSIS — Z01.810 PREPROCEDURAL CARDIOVASCULAR EXAMINATION: ICD-10-CM

## 2022-01-18 DIAGNOSIS — Z01.818 OTHER SPECIFIED PRE-OPERATIVE EXAMINATION: ICD-10-CM

## 2022-01-18 DIAGNOSIS — Z13.6 SCREENING FOR CARDIOVASCULAR CONDITION: ICD-10-CM

## 2022-01-18 LAB — SARS-COV-2 RNA PNL SPEC NAA+PROBE: DETECTED

## 2022-01-18 PROCEDURE — 87635 SARS-COV-2 COVID-19 AMP PRB: CPT

## 2022-01-18 PROCEDURE — C9803 HOPD COVID-19 SPEC COLLECT: HCPCS

## 2022-01-19 ENCOUNTER — TELEPHONE (OUTPATIENT)
Dept: CARDIOLOGY | Facility: CLINIC | Age: 77
End: 2022-01-19

## 2022-01-19 NOTE — TELEPHONE ENCOUNTER
No, we cannot place an order for his wife to get COVID tested.    She needs to reach out to her primary care provider for this or go to urgent care to see if they can test her.

## 2022-01-19 NOTE — TELEPHONE ENCOUNTER
Pt left a VM asking if you could place an order for his wife to get a COVID test, since he tested positive yesterday and [facility] told him to have everyone that had been around him test as well.       Please advise

## 2022-01-24 ENCOUNTER — TELEPHONE (OUTPATIENT)
Dept: CARDIOLOGY | Facility: CLINIC | Age: 77
End: 2022-01-24

## 2022-01-24 ENCOUNTER — TRANSCRIBE ORDERS (OUTPATIENT)
Dept: CARDIOLOGY | Facility: CLINIC | Age: 77
End: 2022-01-24

## 2022-01-24 ENCOUNTER — TELEPHONE (OUTPATIENT)
Dept: INTERNAL MEDICINE | Facility: CLINIC | Age: 77
End: 2022-01-24

## 2022-01-24 ENCOUNTER — LAB (OUTPATIENT)
Dept: LAB | Facility: HOSPITAL | Age: 77
End: 2022-01-24

## 2022-01-24 DIAGNOSIS — Z01.818 OTHER SPECIFIED PRE-OPERATIVE EXAMINATION: ICD-10-CM

## 2022-01-24 DIAGNOSIS — Z01.818 OTHER SPECIFIED PRE-OPERATIVE EXAMINATION: Primary | ICD-10-CM

## 2022-01-24 LAB — SARS-COV-2 RNA RESP QL NAA+PROBE: DETECTED

## 2022-01-24 PROCEDURE — C9803 HOPD COVID-19 SPEC COLLECT: HCPCS

## 2022-01-24 PROCEDURE — U0003 INFECTIOUS AGENT DETECTION BY NUCLEIC ACID (DNA OR RNA); SEVERE ACUTE RESPIRATORY SYNDROME CORONAVIRUS 2 (SARS-COV-2) (CORONAVIRUS DISEASE [COVID-19]), AMPLIFIED PROBE TECHNIQUE, MAKING USE OF HIGH THROUGHPUT TECHNOLOGIES AS DESCRIBED BY CMS-2020-01-R: HCPCS

## 2022-01-24 PROCEDURE — U0005 INFEC AGEN DETEC AMPLI PROBE: HCPCS

## 2022-01-24 NOTE — TELEPHONE ENCOUNTER
Caller: Gary Grullon    Relationship to patient: Self    Best call back number: [phone]    Date of positive COVID19 test:1/24/22    COVID19 symptoms: SLIGHT COUGH    Additional information or concerns: WANTS TO GET OVER COVID  ASAP-  POSSIBLE INFUSION?  PLEASE CONTACT THE PATIENT.  ANY TYPE OF MEDICATION HE CAN TAKE?

## 2022-01-31 ENCOUNTER — TRANSCRIBE ORDERS (OUTPATIENT)
Dept: CARDIOLOGY | Facility: CLINIC | Age: 77
End: 2022-01-31

## 2022-01-31 ENCOUNTER — LAB (OUTPATIENT)
Dept: LAB | Facility: HOSPITAL | Age: 77
End: 2022-01-31

## 2022-01-31 ENCOUNTER — ANESTHESIA EVENT (OUTPATIENT)
Dept: POSTOP/PACU | Facility: HOSPITAL | Age: 77
End: 2022-01-31

## 2022-01-31 DIAGNOSIS — Z01.818 OTHER SPECIFIED PRE-OPERATIVE EXAMINATION: ICD-10-CM

## 2022-01-31 LAB — SARS-COV-2 ORF1AB RESP QL NAA+PROBE: DETECTED

## 2022-01-31 PROCEDURE — C9803 HOPD COVID-19 SPEC COLLECT: HCPCS

## 2022-01-31 PROCEDURE — U0005 INFEC AGEN DETEC AMPLI PROBE: HCPCS

## 2022-01-31 PROCEDURE — U0004 COV-19 TEST NON-CDC HGH THRU: HCPCS

## 2022-02-01 ENCOUNTER — ANESTHESIA (OUTPATIENT)
Dept: POSTOP/PACU | Facility: HOSPITAL | Age: 77
End: 2022-02-01

## 2022-02-01 ENCOUNTER — TELEPHONE (OUTPATIENT)
Dept: CARDIOLOGY | Facility: CLINIC | Age: 77
End: 2022-02-01

## 2022-02-01 ENCOUNTER — HOSPITAL ENCOUNTER (OUTPATIENT)
Dept: POSTOP/PACU | Facility: HOSPITAL | Age: 77
Discharge: HOME | End: 2022-02-01

## 2022-02-01 VITALS
TEMPERATURE: 97.7 F | OXYGEN SATURATION: 96 % | HEART RATE: 105 BPM | SYSTOLIC BLOOD PRESSURE: 107 MMHG | DIASTOLIC BLOOD PRESSURE: 87 MMHG | RESPIRATION RATE: 16 BRPM

## 2022-02-01 VITALS — OXYGEN SATURATION: 96 % | HEART RATE: 93 BPM | SYSTOLIC BLOOD PRESSURE: 89 MMHG | DIASTOLIC BLOOD PRESSURE: 74 MMHG

## 2022-02-01 DIAGNOSIS — I48.19 PERSISTENT ATRIAL FIBRILLATION WITH RVR: ICD-10-CM

## 2022-02-01 DIAGNOSIS — R06.02 EXERTIONAL SHORTNESS OF BREATH: Primary | ICD-10-CM

## 2022-02-01 LAB
ANION GAP SERPL CALCULATED.3IONS-SCNC: 14 MMOL/L (ref 5–15)
BUN SERPL-MCNC: 22 MG/DL (ref 8–23)
BUN/CREAT SERPL: 18.2 (ref 7–25)
CALCIUM SPEC-SCNC: 9.1 MG/DL (ref 8.6–10.5)
CHLORIDE SERPL-SCNC: 106 MMOL/L (ref 98–107)
CO2 SERPL-SCNC: 19 MMOL/L (ref 22–29)
CREAT SERPL-MCNC: 1.21 MG/DL (ref 0.76–1.27)
GFR SERPL CREATININE-BSD FRML MDRD: 58 ML/MIN/1.73
GLUCOSE SERPL-MCNC: 137 MG/DL (ref 65–99)
NT-PROBNP SERPL-MCNC: 1524 PG/ML (ref 0–1800)
POTASSIUM SERPL-SCNC: 4.3 MMOL/L (ref 3.5–5.2)
QT INTERVAL: 341 MS
SODIUM SERPL-SCNC: 139 MMOL/L (ref 136–145)

## 2022-02-01 PROCEDURE — 25010000002 PHENYLEPHRINE 10 MG/ML SOLUTION: Performed by: NURSE ANESTHETIST, CERTIFIED REGISTERED

## 2022-02-01 PROCEDURE — 92960 CARDIOVERSION ELECTRIC EXT: CPT | Performed by: INTERNAL MEDICINE

## 2022-02-01 PROCEDURE — 92960 CARDIOVERSION ELECTRIC EXT: CPT

## 2022-02-01 PROCEDURE — 83880 ASSAY OF NATRIURETIC PEPTIDE: CPT | Performed by: INTERNAL MEDICINE

## 2022-02-01 PROCEDURE — 25010000002 PROPOFOL 10 MG/ML EMULSION: Performed by: ANESTHESIOLOGY

## 2022-02-01 PROCEDURE — 93010 ELECTROCARDIOGRAM REPORT: CPT | Performed by: INTERNAL MEDICINE

## 2022-02-01 PROCEDURE — 80048 BASIC METABOLIC PNL TOTAL CA: CPT | Performed by: INTERNAL MEDICINE

## 2022-02-01 PROCEDURE — 93005 ELECTROCARDIOGRAM TRACING: CPT | Performed by: INTERNAL MEDICINE

## 2022-02-01 RX ORDER — PHENYLEPHRINE HYDROCHLORIDE 10 MG/ML
INJECTION INTRAVENOUS AS NEEDED
Status: DISCONTINUED | OUTPATIENT
Start: 2022-02-01 | End: 2022-02-01 | Stop reason: SURG

## 2022-02-01 RX ORDER — AMIODARONE HYDROCHLORIDE 200 MG/1
200 TABLET ORAL DAILY
Qty: 90 TABLET | Refills: 1 | Status: SHIPPED | OUTPATIENT
Start: 2022-02-01 | End: 2022-02-08

## 2022-02-01 RX ORDER — ATENOLOL 50 MG/1
50 TABLET ORAL 2 TIMES DAILY
Qty: 90 TABLET | Refills: 3 | Status: SHIPPED | OUTPATIENT
Start: 2022-02-01 | End: 2022-02-09

## 2022-02-01 RX ORDER — SODIUM CHLORIDE, SODIUM LACTATE, POTASSIUM CHLORIDE, CALCIUM CHLORIDE 600; 310; 30; 20 MG/100ML; MG/100ML; MG/100ML; MG/100ML
INJECTION, SOLUTION INTRAVENOUS CONTINUOUS PRN
Status: DISCONTINUED | OUTPATIENT
Start: 2022-02-01 | End: 2022-02-01 | Stop reason: SURG

## 2022-02-01 RX ORDER — PROPOFOL 10 MG/ML
VIAL (ML) INTRAVENOUS AS NEEDED
Status: DISCONTINUED | OUTPATIENT
Start: 2022-02-01 | End: 2022-02-01 | Stop reason: SURG

## 2022-02-01 RX ADMIN — PHENYLEPHRINE HYDROCHLORIDE 100 MCG: 10 INJECTION, SOLUTION INTRAVENOUS at 07:38

## 2022-02-01 RX ADMIN — PROPOFOL 160 MG: 10 INJECTION, EMULSION INTRAVENOUS at 07:19

## 2022-02-01 RX ADMIN — PROPOFOL 100 MG: 10 INJECTION, EMULSION INTRAVENOUS at 07:25

## 2022-02-01 RX ADMIN — SODIUM CHLORIDE, POTASSIUM CHLORIDE, SODIUM LACTATE AND CALCIUM CHLORIDE: 600; 310; 30; 20 INJECTION, SOLUTION INTRAVENOUS at 07:16

## 2022-02-01 NOTE — DISCHARGE INSTRUCTIONS
Stop taking Metoprolol    Start Atenolol and Amiodarone today - take them both twice per day    Take your Amiodarone twice daily for 1 week - then take once per day    Electrical Cardioversion  Electrical cardioversion is the delivery of a jolt of electricity to restore a normal rhythm to the heart. A rhythm that is too fast or is not regular keeps the heart from pumping well. In this procedure, sticky patches or metal paddles are placed on the chest to deliver electricity to the heart from a device.  This procedure may be done in an emergency if:  · There is low or no blood pressure as a result of the heart rhythm.  · Normal rhythm must be restored as fast as possible to protect the brain and heart from further damage.  · It may save a life.  This may also be a scheduled procedure for irregular or fast heart rhythms that are not immediately life-threatening.  What are the risks?  Generally, this is a safe procedure. However, problems may occur, including:  · Allergic reactions to medicines.  · A blood clot that breaks free and travels to other parts of your body (a stroke).    · The possible return of an abnormal heart rhythm within hours or days after the procedure.  · Your heart stopping (cardiac arrest). This is rare.  .  Follow these instructions at home:  · Do not drive for 24 hours if you were given a sedative during your procedure.  · Take over-the-counter and prescription medicines only as told by your health care provider.  · Ask your health care provider how to check your pulse. Check it often.  · Rest for 24 hours after the procedure or as told by your health care provider.  · Avoid or limit your caffeine use as told by your health care provider.  · Keep all follow-up visits as told by your health care provider. This is important.  Contact a health care provider if:  · You feel like your heart is beating too quickly or your pulse is not regular.  · You have a serious muscle cramp that does not go  away.  Get help right away if:  · You have discomfort in your chest.  · You are dizzy or you feel faint.  · You have trouble breathing or you are short of breath.  · Your speech is slurred.  · You have trouble moving an arm or leg on one side of your body.  · Your fingers or toes turn cold or blue.      Call 911 if:     · You have any symptoms of a stroke.  Remember BE FAST  · B-balance. Sudden trouble walking or loss of balance.  · E-eyes.  Sudden changes in how you see or a sudden onset of a very bad headache.   · F-face. Sudden weakness or loss of feeling of the face or facial droop on one side.   · A-arms Sudden weakness or numbness in one arm.  One arm drifts down if they are both held out in front of you. This happens suddenly and usually on one side of the body.  · S-speech.  Sudden trouble speaking, slurred speech or trouble understanding what people are saying.   · T-time Time to call emergency services.  Write down the symptoms and the time they started.       This information is not intended to replace advice given to you by your health care provider. Make sure you discuss any questions you have with your health care provider.

## 2022-02-01 NOTE — ANESTHESIA PREPROCEDURE EVALUATION
Anesthesia Evaluation     no history of anesthetic complications:               Airway   Mallampati: II  Neck ROM: full  Dental - normal exam     Pulmonary    (+) recent URI, sleep apnea,   (-) shortness of breath  Cardiovascular     (+) hypertension, dysrhythmias Atrial Fib, hyperlipidemia,       Neuro/Psych  GI/Hepatic/Renal/Endo    (+) obesity,   renal disease,     Musculoskeletal     Abdominal    Substance History      OB/GYN          Other                        Anesthesia Plan    ASA 3     intravenous induction           CODE STATUS:

## 2022-02-01 NOTE — ANESTHESIA POSTPROCEDURE EVALUATION
Patient: Gary Grullon    Procedure Summary     Date: 02/01/22 Room / Location: [facility] PACU    Anesthesia Start: 0715 Anesthesia Stop: 0746    Procedure: CARDIOVERSION EXTERNAL IN CARDIOLOGY DEPARTMENT Diagnosis:       Persistent atrial fibrillation with RVR (HCC)      (Atrial fibrilaltion.)    Scheduled Providers: Yash Soliz MD Provider: Aranza Briscoe MD    Anesthesia Type: Not recorded ASA Status: 3          Anesthesia Type: No value filed.    Vitals  Vitals Value Taken Time   /82 02/01/22 0810   Temp     Pulse 94 02/01/22 0812   Resp 16 02/01/22 0810   SpO2 95 % 02/01/22 0810   Vitals shown include unvalidated device data.        Post Anesthesia Care and Evaluation      Comments: Pt. Discharged prior to being evaluated by anesthesia.  Chart is reviewed and no complications are noted.  THIS CASE IS NOT MEDICALLY DIRECTED

## 2022-02-01 NOTE — H&P
Date of Hospital Visit: 2022  Encounter Provider: Yash Soliz MD  Place of Service: [facility] CARDIOLOGY  Patient Name: Gary Grullon  :1945  Referral Provider: Yash Soliz MD    Chief complaint-persistent atrial fibrillation    History of Present Illness  Mr. Mathis is a 75 yo man here for elective direct current cardioversion for persistent symptomatic atrial fibrillation.  He has taken Eliquis without interruption for several months.  Last dose was this morning and denies any bleeding complication.  He has been n.p.o. since after midnight and denies.  No new complaints today      Past Medical History:   Diagnosis Date   • Fracture of elbow    • HEDY (obstructive sleep apnea)     AHI 15/h supine 49/h       Past Surgical History:   Procedure Laterality Date   • CATARACT EXTRACTION     [facility]   • CYST REMOVAL  1970   • EYE SURGERY     • PILONIDAL CYST DRAINAGE         (Not in a hospital admission)      Current Meds  Scheduled Meds:  Continuous Infusions:No current facility-administered medications for this encounter.    PRN Meds:.    Allergies as of 2022   • (No Known Allergies)       Social History     Socioeconomic History   • Marital status:    Tobacco Use   • Smoking status: Never Smoker   • Smokeless tobacco: Never Used   Vaping Use   • Vaping Use: Never used   Substance and Sexual Activity   • Alcohol use: Yes     Comment: 2 glasses of wine with dinner   • Drug use: No   • Sexual activity: Defer       Family History   Problem Relation Age of Onset   • COPD Mother    • Hypertension Father    • Diabetes Father    • Stroke Father    • Coronary artery disease Father    • Kidney disease Sister    • Diabetes Brother    • No Known Problems Maternal Aunt    • No Known Problems Maternal Uncle    • No Known Problems Paternal Aunt    • No Known Problems Paternal Uncle    • No Known Problems  Maternal Grandmother    • No Known Problems Maternal Grandfather    • No Known Problems Paternal Grandmother    • No Known Problems Paternal Grandfather    • Anesthesia problems Neg Hx    • Broken bones Neg Hx    • Cancer Neg Hx    • Clotting disorder Neg Hx    • Collagen disease Neg Hx    • Dislocations Neg Hx    • Osteoporosis Neg Hx    • Rheumatologic disease Neg Hx    • Scoliosis Neg Hx    • Severe sprains Neg Hx        REVIEW OF SYSTEMS:   All systems reviewed and negative except as noted in HPI.       Objective:   Temp:  [97.7 °F (36.5 °C)] 97.7 °F (36.5 °C)  Heart Rate:  [122] 122  Resp:  [16] 16  BP: (116)/(93) 116/93  There is no height or weight on file to calculate BMI.    Vitals:    02/01/22 0605   BP: 116/93   Pulse: (!) 122   Resp: 16   Temp: 97.7 °F (36.5 °C)   SpO2: 97%       General Appearance:    Alert, cooperative, in no acute distress   Head:    Normocephalic, without obvious abnormality, atraumatic   Eyes:            Lids and lashes normal, conjunctivae and sclerae normal, no   icterus, no pallor, corneas clear, PERRLA   Ears:    Ears appear intact with no abnormalities noted   Throat:   No oral lesions, no thrush, oral mucosa moist   Neck:   No adenopathy, supple, trachea midline, no thyromegaly, no   carotid bruit, no JVD   Back:     No kyphosis present, no scoliosis present, no skin lesions, erythema or scars, no tenderness to percussion or palpation, range of motion normal   Lungs:     Clear to auscultation,respirations regular, even and unlabored    Heart:   Irregularly irregular and tachycardic, normal S1 and S2, no murmur, no gallop, no rub, no click   Chest Wall:    No abnormalities observed   Abdomen:     Normal bowel sounds, no masses, no organomegaly, soft nontender, nondistended, no guarding, no rebound  tenderness   Extremities:   Moves all extremities well, no edema, no cyanosis, no redness   Pulses:   Pulses palpable and equal bilaterally. Normal radial, carotid, femoral, dorsalis  pedis and posterior tibial pulses bilaterally. Normal abdominal aorta   Skin:  Neurology:   Psychiatric:   No bleeding, bruising or rash   Normal speech and cranial nerve exam, no focal deficit   Alert and oriented x 3, normal mood and affect                 Review of Data:      Results from last 7 days   Lab Units 02/01/22  0622   SODIUM mmol/L 139   POTASSIUM mmol/L 4.3   CHLORIDE mmol/L 106   CO2 mmol/L 19.0*   BUN mg/dL 22   CREATININE mg/dL 1.21   CALCIUM mg/dL 9.1   GLUCOSE mg/dL 137*         @[email or web address]@              @[facility](chol,trig,hdl,ldl)    I personally viewed and interpreted the patient's EKG/Telemetry data  )  Patient Active Problem List   Diagnosis   • Seborrheic keratosis   • Hypercholesteremia   • Essential hypertension   • Tubular adenoma of colon   • PMR (polymyalgia rheumatica) (HCC)   • HEDY on CPAP   • Pre-diabetes   • Class 1 obesity due to excess calories without serious comorbidity with body mass index (BMI) of 31.0 to 31.9 in adult   • Paroxysmal atrial fibrillation (HCC)   • Stage 3a chronic kidney disease (HCC)     Assessment and Plan:      1.  Persistent atrial fibrillation with rapid ventricular response  Adequately anticoagulated  N.p.o. since after midnight    Patient signed informed consent to proceed his procedure    Yash Soliz MD  02/01/22  07:09 EST.  Time spent in reviewing chart, discussion and examination:

## 2022-02-01 NOTE — TELEPHONE ENCOUNTER
Patient called the answering service with questions regarding medication instructions given to him following his cardioversion today.  He insisted that I clarify with Dr. Soliz, so I gave him a call.  Per Dr. Soliz, patient is to take another dose of the amiodarone 200 mg tonight.  Tomorrow he will start atenolol and amio BID.  He voiced understanding.

## 2022-02-08 ENCOUNTER — CLINICAL SUPPORT (OUTPATIENT)
Dept: CARDIOLOGY | Facility: CLINIC | Age: 77
End: 2022-02-08

## 2022-02-08 ENCOUNTER — HOSPITAL ENCOUNTER (OUTPATIENT)
Dept: CARDIOLOGY | Facility: HOSPITAL | Age: 77
Discharge: HOME | End: 2022-02-08
Admitting: INTERNAL MEDICINE

## 2022-02-08 VITALS
OXYGEN SATURATION: 98 % | HEIGHT: 73 IN | HEART RATE: 96 BPM | SYSTOLIC BLOOD PRESSURE: 116 MMHG | WEIGHT: 236 LBS | DIASTOLIC BLOOD PRESSURE: 89 MMHG | BODY MASS INDEX: 31.28 KG/M2

## 2022-02-08 DIAGNOSIS — R06.02 EXERTIONAL SHORTNESS OF BREATH: ICD-10-CM

## 2022-02-08 DIAGNOSIS — I48.19 PERSISTENT ATRIAL FIBRILLATION WITH RVR: Primary | ICD-10-CM

## 2022-02-08 PROCEDURE — 93306 TTE W/DOPPLER COMPLETE: CPT | Performed by: INTERNAL MEDICINE

## 2022-02-08 PROCEDURE — 25010000002 PERFLUTREN (DEFINITY) 8.476 MG IN SODIUM CHLORIDE (PF) 0.9 % 10 ML INJECTION: Performed by: INTERNAL MEDICINE

## 2022-02-08 PROCEDURE — 93306 TTE W/DOPPLER COMPLETE: CPT

## 2022-02-08 PROCEDURE — 93000 ELECTROCARDIOGRAM COMPLETE: CPT | Performed by: INTERNAL MEDICINE

## 2022-02-08 RX ORDER — SACUBITRIL AND VALSARTAN 24; 26 MG/1; MG/1
1 TABLET, FILM COATED ORAL 2 TIMES DAILY
Qty: 60 TABLET
Start: 2022-02-08 | End: 2022-03-22

## 2022-02-08 RX ORDER — AMIODARONE HYDROCHLORIDE 200 MG/1
200 TABLET ORAL 2 TIMES DAILY
Qty: 90 TABLET | Refills: 1
Start: 2022-02-08 | End: 2022-02-16 | Stop reason: SDUPTHER

## 2022-02-08 RX ADMIN — PERFLUTREN 1.5 ML: 6.52 INJECTION, SUSPENSION INTRAVENOUS at 15:31

## 2022-02-09 DIAGNOSIS — I48.19 PERSISTENT ATRIAL FIBRILLATION WITH RVR: Primary | ICD-10-CM

## 2022-02-09 LAB
AORTIC ARCH: 3.4 CM
ASCENDING AORTA: 3.8 CM
BH CV ECHO MEAS - ACS: 1.9 CM
BH CV ECHO MEAS - AO MAX PG (FULL): 2.3 MMHG
BH CV ECHO MEAS - AO MAX PG: 3.9 MMHG
BH CV ECHO MEAS - AO MEAN PG (FULL): 1.5 MMHG
BH CV ECHO MEAS - AO MEAN PG: 2.4 MMHG
BH CV ECHO MEAS - AO ROOT AREA (BSA CORRECTED): 1.7
BH CV ECHO MEAS - AO ROOT AREA: 11.9 CM^2
BH CV ECHO MEAS - AO ROOT DIAM: 3.9 CM
BH CV ECHO MEAS - AO V2 MAX: 98.8 CM/SEC
BH CV ECHO MEAS - AO V2 MEAN: 75 CM/SEC
BH CV ECHO MEAS - AO V2 VTI: 15.1 CM
BH CV ECHO MEAS - ASC AORTA: 3.8 CM
BH CV ECHO MEAS - AVA(I,A): 2.5 CM^2
BH CV ECHO MEAS - AVA(I,D): 2.5 CM^2
BH CV ECHO MEAS - AVA(V,A): 2.1 CM^2
BH CV ECHO MEAS - AVA(V,D): 2.1 CM^2
BH CV ECHO MEAS - BSA(HAYCOCK): 2.4 M^2
BH CV ECHO MEAS - BSA: 2.3 M^2
BH CV ECHO MEAS - BZI_BMI: 30.3 KILOGRAMS/M^2
BH CV ECHO MEAS - BZI_METRIC_HEIGHT: 188 CM
BH CV ECHO MEAS - BZI_METRIC_WEIGHT: 107.1 KG
BH CV ECHO MEAS - EDV(MOD-SP2): 156 ML
BH CV ECHO MEAS - EDV(MOD-SP4): 136 ML
BH CV ECHO MEAS - EDV(TEICH): 119.9 ML
BH CV ECHO MEAS - EF(CUBED): 28.4 %
BH CV ECHO MEAS - EF(MOD-BP): 20.7 %
BH CV ECHO MEAS - EF(MOD-SP2): 24.4 %
BH CV ECHO MEAS - EF(MOD-SP4): 15.4 %
BH CV ECHO MEAS - EF(TEICH): 22.9 %
BH CV ECHO MEAS - ESV(MOD-SP2): 118 ML
BH CV ECHO MEAS - ESV(MOD-SP4): 115 ML
BH CV ECHO MEAS - ESV(TEICH): 92.5 ML
BH CV ECHO MEAS - FS: 10.5 %
BH CV ECHO MEAS - IVS/LVPW: 0.93
BH CV ECHO MEAS - IVSD: 0.93 CM
BH CV ECHO MEAS - LAT PEAK E' VEL: 8.2 CM/SEC
BH CV ECHO MEAS - LV DIASTOLIC VOL/BSA (35-75): 58.3 ML/M^2
BH CV ECHO MEAS - LV MASS(C)D: 174.9 GRAMS
BH CV ECHO MEAS - LV MASS(C)DI: 75 GRAMS/M^2
BH CV ECHO MEAS - LV MAX PG: 1.6 MMHG
BH CV ECHO MEAS - LV MEAN PG: 0.92 MMHG
BH CV ECHO MEAS - LV SYSTOLIC VOL/BSA (12-30): 49.3 ML/M^2
BH CV ECHO MEAS - LV V1 MAX: 63.8 CM/SEC
BH CV ECHO MEAS - LV V1 MEAN: 45.4 CM/SEC
BH CV ECHO MEAS - LV V1 VTI: 11.5 CM
BH CV ECHO MEAS - LVIDD: 5 CM
BH CV ECHO MEAS - LVIDS: 4.5 CM
BH CV ECHO MEAS - LVLD AP2: 8.9 CM
BH CV ECHO MEAS - LVLD AP4: 8.4 CM
BH CV ECHO MEAS - LVLS AP2: 8.8 CM
BH CV ECHO MEAS - LVLS AP4: 8.3 CM
BH CV ECHO MEAS - LVOT AREA (M): 3.1 CM^2
BH CV ECHO MEAS - LVOT AREA: 3.3 CM^2
BH CV ECHO MEAS - LVOT DIAM: 2 CM
BH CV ECHO MEAS - LVPWD: 1 CM
BH CV ECHO MEAS - MED PEAK E' VEL: 5.7 CM/SEC
BH CV ECHO MEAS - MR MAX PG: 94.7 MMHG
BH CV ECHO MEAS - MR MAX VEL: 486.5 CM/SEC
BH CV ECHO MEAS - MV DEC SLOPE: 141 CM/SEC^2
BH CV ECHO MEAS - MV DEC TIME: 0.1 SEC
BH CV ECHO MEAS - MV E MAX VEL: 89.1 CM/SEC
BH CV ECHO MEAS - MV MAX PG: 2.9 MMHG
BH CV ECHO MEAS - MV MEAN PG: 1 MMHG
BH CV ECHO MEAS - MV P1/2T MAX VEL: 82.5 CM/SEC
BH CV ECHO MEAS - MV P1/2T: 171.5 MSEC
BH CV ECHO MEAS - MV V2 MAX: 85.8 CM/SEC
BH CV ECHO MEAS - MV V2 MEAN: 47.8 CM/SEC
BH CV ECHO MEAS - MV V2 VTI: 27.8 CM
BH CV ECHO MEAS - MVA P1/2T LCG: 2.7 CM^2
BH CV ECHO MEAS - MVA(P1/2T): 1.3 CM^2
BH CV ECHO MEAS - MVA(VTI): 1.4 CM^2
BH CV ECHO MEAS - PA ACC TIME: 0.09 SEC
BH CV ECHO MEAS - PA MAX PG (FULL): 1.6 MMHG
BH CV ECHO MEAS - PA MAX PG: 2 MMHG
BH CV ECHO MEAS - PA PR(ACCEL): 39.8 MMHG
BH CV ECHO MEAS - PA V2 MAX: 70.6 CM/SEC
BH CV ECHO MEAS - PVA(V,A): 2.1 CM^2
BH CV ECHO MEAS - PVA(V,D): 2.1 CM^2
BH CV ECHO MEAS - QP/QS: 0.7
BH CV ECHO MEAS - RAP SYSTOLE: 8 MMHG
BH CV ECHO MEAS - RV MAX PG: 0.44 MMHG
BH CV ECHO MEAS - RV MEAN PG: 0.21 MMHG
BH CV ECHO MEAS - RV V1 MAX: 33 CM/SEC
BH CV ECHO MEAS - RV V1 MEAN: 21.2 CM/SEC
BH CV ECHO MEAS - RV V1 VTI: 6 CM
BH CV ECHO MEAS - RVOT AREA: 4.4 CM^2
BH CV ECHO MEAS - RVOT DIAM: 2.4 CM
BH CV ECHO MEAS - RVSP: 30.8 MMHG
BH CV ECHO MEAS - SI(AO): 77 ML/M^2
BH CV ECHO MEAS - SI(CUBED): 15.5 ML/M^2
BH CV ECHO MEAS - SI(LVOT): 16.2 ML/M^2
BH CV ECHO MEAS - SI(MOD-SP2): 16.3 ML/M^2
BH CV ECHO MEAS - SI(MOD-SP4): 9 ML/M^2
BH CV ECHO MEAS - SI(TEICH): 11.8 ML/M^2
BH CV ECHO MEAS - SUP REN AO DIAM: 2.1 CM
BH CV ECHO MEAS - SV(AO): 179.4 ML
BH CV ECHO MEAS - SV(CUBED): 36.1 ML
BH CV ECHO MEAS - SV(LVOT): 37.7 ML
BH CV ECHO MEAS - SV(MOD-SP2): 38 ML
BH CV ECHO MEAS - SV(MOD-SP4): 21 ML
BH CV ECHO MEAS - SV(RVOT): 26.3 ML
BH CV ECHO MEAS - SV(TEICH): 27.5 ML
BH CV ECHO MEAS - TAPSE (>1.6): 1.4 CM
BH CV ECHO MEAS - TR MAX VEL: 238.9 CM/SEC
BH CV ECHO MEASUREMENTS AVERAGE E/E' RATIO: 12.82
BH CV XLRA - RV BASE: 3.3 CM
BH CV XLRA - RV LENGTH: 7.7 CM
BH CV XLRA - RV MID: 2 CM
BH CV XLRA - TDI S': 10.6 CM/SEC
LEFT ATRIUM VOLUME INDEX: 28 ML/M2
MAXIMAL PREDICTED HEART RATE: 145 BPM
SINUS: 3.8 CM
STJ: 3.8 CM
STRESS TARGET HR: 123 BPM

## 2022-02-09 RX ORDER — METOPROLOL SUCCINATE 50 MG/1
50 TABLET, EXTENDED RELEASE ORAL 2 TIMES DAILY
Qty: 90 TABLET | Refills: 3 | Status: SHIPPED | OUTPATIENT
Start: 2022-02-09 | End: 2022-02-16 | Stop reason: SDUPTHER

## 2022-02-10 ENCOUNTER — TRANSCRIBE ORDERS (OUTPATIENT)
Dept: CARDIOLOGY | Facility: CLINIC | Age: 77
End: 2022-02-10

## 2022-02-10 DIAGNOSIS — Z13.6 SCREENING FOR CARDIOVASCULAR CONDITION: ICD-10-CM

## 2022-02-10 DIAGNOSIS — Z01.810 PREPROCEDURAL CARDIOVASCULAR EXAMINATION: ICD-10-CM

## 2022-02-10 DIAGNOSIS — Z01.818 OTHER SPECIFIED PRE-OPERATIVE EXAMINATION: Primary | ICD-10-CM

## 2022-02-15 ENCOUNTER — LAB (OUTPATIENT)
Dept: LAB | Facility: HOSPITAL | Age: 77
End: 2022-02-15

## 2022-02-15 DIAGNOSIS — Z01.810 PREPROCEDURAL CARDIOVASCULAR EXAMINATION: ICD-10-CM

## 2022-02-15 DIAGNOSIS — Z13.6 SCREENING FOR CARDIOVASCULAR CONDITION: ICD-10-CM

## 2022-02-15 DIAGNOSIS — Z01.818 OTHER SPECIFIED PRE-OPERATIVE EXAMINATION: ICD-10-CM

## 2022-02-15 LAB — SARS-COV-2 ORF1AB RESP QL NAA+PROBE: NOT DETECTED

## 2022-02-15 PROCEDURE — U0004 COV-19 TEST NON-CDC HGH THRU: HCPCS

## 2022-02-15 PROCEDURE — U0005 INFEC AGEN DETEC AMPLI PROBE: HCPCS

## 2022-02-15 PROCEDURE — C9803 HOPD COVID-19 SPEC COLLECT: HCPCS

## 2022-02-16 ENCOUNTER — TELEPHONE (OUTPATIENT)
Dept: CARDIOLOGY | Facility: CLINIC | Age: 77
End: 2022-02-16

## 2022-02-16 ENCOUNTER — ANESTHESIA (OUTPATIENT)
Dept: POSTOP/PACU | Facility: HOSPITAL | Age: 77
End: 2022-02-16

## 2022-02-16 ENCOUNTER — ANESTHESIA EVENT (OUTPATIENT)
Dept: POSTOP/PACU | Facility: HOSPITAL | Age: 77
End: 2022-02-16

## 2022-02-16 ENCOUNTER — HOSPITAL ENCOUNTER (OUTPATIENT)
Dept: POSTOP/PACU | Facility: HOSPITAL | Age: 77
Discharge: HOME | End: 2022-02-16

## 2022-02-16 VITALS — OXYGEN SATURATION: 99 % | SYSTOLIC BLOOD PRESSURE: 114 MMHG | DIASTOLIC BLOOD PRESSURE: 71 MMHG | HEART RATE: 41 BPM

## 2022-02-16 VITALS
RESPIRATION RATE: 16 BRPM | OXYGEN SATURATION: 94 % | HEART RATE: 48 BPM | TEMPERATURE: 98.1 F | DIASTOLIC BLOOD PRESSURE: 78 MMHG | SYSTOLIC BLOOD PRESSURE: 105 MMHG

## 2022-02-16 DIAGNOSIS — I48.19 PERSISTENT ATRIAL FIBRILLATION WITH RVR: ICD-10-CM

## 2022-02-16 LAB
ANION GAP SERPL CALCULATED.3IONS-SCNC: 9.9 MMOL/L (ref 5–15)
BUN SERPL-MCNC: 19 MG/DL (ref 8–23)
BUN/CREAT SERPL: 13.9 (ref 7–25)
CALCIUM SPEC-SCNC: 9 MG/DL (ref 8.6–10.5)
CHLORIDE SERPL-SCNC: 107 MMOL/L (ref 98–107)
CO2 SERPL-SCNC: 21.1 MMOL/L (ref 22–29)
CREAT SERPL-MCNC: 1.37 MG/DL (ref 0.76–1.27)
GFR SERPL CREATININE-BSD FRML MDRD: 51 ML/MIN/1.73
GLUCOSE SERPL-MCNC: 114 MG/DL (ref 65–99)
POTASSIUM SERPL-SCNC: 4.1 MMOL/L (ref 3.5–5.2)
QT INTERVAL: 382 MS
QT INTERVAL: 483 MS
SODIUM SERPL-SCNC: 138 MMOL/L (ref 136–145)

## 2022-02-16 PROCEDURE — 93010 ELECTROCARDIOGRAM REPORT: CPT | Performed by: INTERNAL MEDICINE

## 2022-02-16 PROCEDURE — 25010000002 PROPOFOL 10 MG/ML EMULSION: Performed by: ANESTHESIOLOGY

## 2022-02-16 PROCEDURE — 93005 ELECTROCARDIOGRAM TRACING: CPT | Performed by: INTERNAL MEDICINE

## 2022-02-16 PROCEDURE — 92960 CARDIOVERSION ELECTRIC EXT: CPT

## 2022-02-16 PROCEDURE — 80048 BASIC METABOLIC PNL TOTAL CA: CPT | Performed by: INTERNAL MEDICINE

## 2022-02-16 PROCEDURE — 92960 CARDIOVERSION ELECTRIC EXT: CPT | Performed by: INTERNAL MEDICINE

## 2022-02-16 RX ORDER — SODIUM CHLORIDE, SODIUM LACTATE, POTASSIUM CHLORIDE, CALCIUM CHLORIDE 600; 310; 30; 20 MG/100ML; MG/100ML; MG/100ML; MG/100ML
INJECTION, SOLUTION INTRAVENOUS CONTINUOUS PRN
Status: DISCONTINUED | OUTPATIENT
Start: 2022-02-16 | End: 2022-02-16 | Stop reason: SURG

## 2022-02-16 RX ORDER — METOPROLOL SUCCINATE 50 MG/1
50 TABLET, EXTENDED RELEASE ORAL DAILY
Qty: 90 TABLET | Refills: 3
Start: 2022-02-16 | End: 2022-02-18

## 2022-02-16 RX ORDER — HYDROMORPHONE HYDROCHLORIDE 1 MG/ML
0.5 INJECTION, SOLUTION INTRAMUSCULAR; INTRAVENOUS; SUBCUTANEOUS
Status: DISCONTINUED | OUTPATIENT
Start: 2022-02-16 | End: 2022-02-17 | Stop reason: HOSPADM

## 2022-02-16 RX ORDER — OXYCODONE AND ACETAMINOPHEN 7.5; 325 MG/1; MG/1
1 TABLET ORAL EVERY 4 HOURS PRN
Status: DISCONTINUED | OUTPATIENT
Start: 2022-02-16 | End: 2022-02-17 | Stop reason: HOSPADM

## 2022-02-16 RX ORDER — AMIODARONE HYDROCHLORIDE 200 MG/1
200 TABLET ORAL 2 TIMES DAILY
Qty: 90 TABLET | Refills: 1
Start: 2022-02-16 | End: 2022-02-25 | Stop reason: SDUPTHER

## 2022-02-16 RX ORDER — NALOXONE HCL 0.4 MG/ML
0.2 VIAL (ML) INJECTION AS NEEDED
Status: DISCONTINUED | OUTPATIENT
Start: 2022-02-16 | End: 2022-02-17 | Stop reason: HOSPADM

## 2022-02-16 RX ORDER — PROMETHAZINE HYDROCHLORIDE 25 MG/1
25 TABLET ORAL ONCE AS NEEDED
Status: DISCONTINUED | OUTPATIENT
Start: 2022-02-16 | End: 2022-02-17 | Stop reason: HOSPADM

## 2022-02-16 RX ORDER — EPHEDRINE SULFATE 50 MG/ML
5 INJECTION, SOLUTION INTRAVENOUS ONCE AS NEEDED
Status: DISCONTINUED | OUTPATIENT
Start: 2022-02-16 | End: 2022-02-17 | Stop reason: HOSPADM

## 2022-02-16 RX ORDER — HYDRALAZINE HYDROCHLORIDE 20 MG/ML
5 INJECTION INTRAMUSCULAR; INTRAVENOUS
Status: DISCONTINUED | OUTPATIENT
Start: 2022-02-16 | End: 2022-02-17 | Stop reason: HOSPADM

## 2022-02-16 RX ORDER — FENTANYL CITRATE 50 UG/ML
50 INJECTION, SOLUTION INTRAMUSCULAR; INTRAVENOUS
Status: DISCONTINUED | OUTPATIENT
Start: 2022-02-16 | End: 2022-02-17 | Stop reason: HOSPADM

## 2022-02-16 RX ORDER — FLUMAZENIL 0.1 MG/ML
0.2 INJECTION INTRAVENOUS AS NEEDED
Status: DISCONTINUED | OUTPATIENT
Start: 2022-02-16 | End: 2022-02-17 | Stop reason: HOSPADM

## 2022-02-16 RX ORDER — DIPHENHYDRAMINE HCL 25 MG
25 CAPSULE ORAL
Status: DISCONTINUED | OUTPATIENT
Start: 2022-02-16 | End: 2022-02-17 | Stop reason: HOSPADM

## 2022-02-16 RX ORDER — PROPOFOL 10 MG/ML
VIAL (ML) INTRAVENOUS AS NEEDED
Status: DISCONTINUED | OUTPATIENT
Start: 2022-02-16 | End: 2022-02-16 | Stop reason: SURG

## 2022-02-16 RX ORDER — HYDROCODONE BITARTRATE AND ACETAMINOPHEN 7.5; 325 MG/1; MG/1
1 TABLET ORAL ONCE AS NEEDED
Status: DISCONTINUED | OUTPATIENT
Start: 2022-02-16 | End: 2022-02-17 | Stop reason: HOSPADM

## 2022-02-16 RX ORDER — DIPHENHYDRAMINE HYDROCHLORIDE 50 MG/ML
12.5 INJECTION INTRAMUSCULAR; INTRAVENOUS
Status: DISCONTINUED | OUTPATIENT
Start: 2022-02-16 | End: 2022-02-17 | Stop reason: HOSPADM

## 2022-02-16 RX ORDER — LABETALOL HYDROCHLORIDE 5 MG/ML
5 INJECTION, SOLUTION INTRAVENOUS
Status: DISCONTINUED | OUTPATIENT
Start: 2022-02-16 | End: 2022-02-17 | Stop reason: HOSPADM

## 2022-02-16 RX ORDER — IBUPROFEN 600 MG/1
600 TABLET ORAL ONCE AS NEEDED
Status: DISCONTINUED | OUTPATIENT
Start: 2022-02-16 | End: 2022-02-17 | Stop reason: HOSPADM

## 2022-02-16 RX ORDER — PROMETHAZINE HYDROCHLORIDE 25 MG/1
25 SUPPOSITORY RECTAL ONCE AS NEEDED
Status: DISCONTINUED | OUTPATIENT
Start: 2022-02-16 | End: 2022-02-17 | Stop reason: HOSPADM

## 2022-02-16 RX ORDER — ONDANSETRON 2 MG/ML
4 INJECTION INTRAMUSCULAR; INTRAVENOUS ONCE AS NEEDED
Status: DISCONTINUED | OUTPATIENT
Start: 2022-02-16 | End: 2022-02-17 | Stop reason: HOSPADM

## 2022-02-16 RX ADMIN — PROPOFOL 120 MG: 10 INJECTION, EMULSION INTRAVENOUS at 07:46

## 2022-02-16 RX ADMIN — SODIUM CHLORIDE, POTASSIUM CHLORIDE, SODIUM LACTATE AND CALCIUM CHLORIDE: 600; 310; 30; 20 INJECTION, SOLUTION INTRAVENOUS at 07:27

## 2022-02-16 NOTE — DISCHARGE INSTRUCTIONS
Electrical Cardioversion  Electrical cardioversion is the delivery of a jolt of electricity to restore a normal rhythm to the heart. A rhythm that is too fast or is not regular keeps the heart from pumping well. In this procedure, sticky patches or metal paddles are placed on the chest to deliver electricity to the heart from a device.  This procedure may be done in an emergency if:  · There is low or no blood pressure as a result of the heart rhythm.  · Normal rhythm must be restored as fast as possible to protect the brain and heart from further damage.  · It may save a life.  This may also be a scheduled procedure for irregular or fast heart rhythms that are not immediately life-threatening.  What are the risks?  Generally, this is a safe procedure. However, problems may occur, including:  · Allergic reactions to medicines.  · A blood clot that breaks free and travels to other parts of your body (a stroke).    · The possible return of an abnormal heart rhythm within hours or days after the procedure.  · Your heart stopping (cardiac arrest). This is rare.  .  Follow these instructions at home:  · Do not drive for 24 hours if you were given a sedative during your procedure.  · Take over-the-counter and prescription medicines only as told by your health care provider.  · Ask your health care provider how to check your pulse. Check it often.  · Rest for 24 hours after the procedure or as told by your health care provider.  · Avoid or limit your caffeine use as told by your health care provider.  · Keep all follow-up visits as told by your health care provider. This is important.  Contact a health care provider if:  · You feel like your heart is beating too quickly or your pulse is not regular.  · You have a serious muscle cramp that does not go away.  Get help right away if:  · You have discomfort in your chest.  · You are dizzy or you feel faint.  · You have trouble breathing or you are short of  breath.  · Your speech is slurred.  · You have trouble moving an arm or leg on one side of your body.  · Your fingers or toes turn cold or blue.      Call 911 if:     · You have any symptoms of a stroke.  Remember BE FAST  · B-balance. Sudden trouble walking or loss of balance.  · E-eyes.  Sudden changes in how you see or a sudden onset of a very bad headache.   · F-face. Sudden weakness or loss of feeling of the face or facial droop on one side.   · A-arms Sudden weakness or numbness in one arm.  One arm drifts down if they are both held out in front of you. This happens suddenly and usually on one side of the body.  · S-speech.  Sudden trouble speaking, slurred speech or trouble understanding what people are saying.   · T-time Time to call emergency services.  Write down the symptoms and the time they started.       This information is not intended to replace advice given to you by your health care provider. Make sure you discuss any questions you have with your health care provider.

## 2022-02-16 NOTE — ANESTHESIA PREPROCEDURE EVALUATION
Anesthesia Evaluation     Patient summary reviewed and Nursing notes reviewed   no history of anesthetic complications:  NPO Solid Status: > 8 hours  NPO Liquid Status: > 8 hours           Airway   Mallampati: II  Dental      Pulmonary - normal exam   (+) sleep apnea,   Cardiovascular     Rhythm: irregular    (+) hypertension less than 2 medications, dysrhythmias Atrial Fib, hyperlipidemia,       Neuro/Psych- negative ROS  GI/Hepatic/Renal/Endo    (+) obesity,   renal disease CRI,     Musculoskeletal     Abdominal    Substance History      OB/GYN          Other                        Anesthesia Plan    ASA 3     MAC     intravenous induction     Anesthetic plan, all risks, benefits, and alternatives have been provided, discussed and informed consent has been obtained with: patient.        CODE STATUS:

## 2022-02-16 NOTE — ANESTHESIA POSTPROCEDURE EVALUATION
Patient: Gary Grullon    Procedure Summary     Date: 02/16/22 Room / Location: [facility] PACU    Anesthesia Start: 0741 Anesthesia Stop: 0749    Procedure: CARDIOVERSION EXTERNAL IN CARDIOLOGY DEPARTMENT Diagnosis:       Persistent atrial fibrillation with RVR (HCC)      (Afib)    Scheduled Providers: Yash Soliz MD Provider: Rafita Salazar MD    Anesthesia Type: MAC ASA Status: 3          Anesthesia Type: MAC    Vitals  Vitals Value Taken Time   /75 02/16/22 0816   Temp     Pulse 53 02/16/22 0829   Resp 16 02/16/22 0815   SpO2 97 % 02/16/22 0829   Vitals shown include unvalidated device data.        Post Anesthesia Care and Evaluation    Patient location during evaluation: PHASE II  Patient participation: complete - patient participated  Level of consciousness: awake and alert  Pain management: adequate  Airway patency: patent  Anesthetic complications: No anesthetic complications    Cardiovascular status: acceptable  Respiratory status: acceptable  Hydration status: acceptable    Comments: BP 98/80   Pulse 51   Temp 36.7 °C (98.1 °F) (Oral)   Resp 16   SpO2 97%

## 2022-02-16 NOTE — TELEPHONE ENCOUNTER
[phone]  2:15 pm    Pt called and said he had a cardioversion this morning and he is not having very low HR 42-44 and feeling very lightheaded.      He would like to speak with ALEKSANDRA CORRIGAN ALONDRA

## 2022-02-18 RX ORDER — METOPROLOL SUCCINATE 25 MG/1
50 TABLET, EXTENDED RELEASE ORAL DAILY
Start: 2022-02-18 | End: 2022-03-22

## 2022-02-18 NOTE — PROGRESS NOTES
Patient continues to bradycardic at home with HR in the 40's and still holding metoprolol. I have called and discussed with him today. He will cotininue to hold metoprolol till HR is above is 70 and start at half dose. Continue amiodarone at 200mg per day with eliquis bid. He will monitor his bp before each dose of entresto.  HE will be seen in clinic next week. Check BMP on follow up.

## 2022-02-24 ENCOUNTER — OFFICE VISIT (OUTPATIENT)
Dept: CARDIOLOGY | Facility: CLINIC | Age: 77
End: 2022-02-24

## 2022-02-24 VITALS
DIASTOLIC BLOOD PRESSURE: 72 MMHG | WEIGHT: 236 LBS | BODY MASS INDEX: 31.28 KG/M2 | HEIGHT: 73 IN | SYSTOLIC BLOOD PRESSURE: 110 MMHG | HEART RATE: 53 BPM

## 2022-02-24 DIAGNOSIS — Z98.890 HISTORY OF CARDIOVERSION: ICD-10-CM

## 2022-02-24 DIAGNOSIS — I42.9 CARDIOMYOPATHY, UNSPECIFIED TYPE: ICD-10-CM

## 2022-02-24 DIAGNOSIS — I48.0 PAROXYSMAL ATRIAL FIBRILLATION: Primary | ICD-10-CM

## 2022-02-24 PROBLEM — Z92.89 HISTORY OF CARDIOVERSION: Status: ACTIVE | Noted: 2022-02-24

## 2022-02-24 PROCEDURE — 99214 OFFICE O/P EST MOD 30 MIN: CPT | Performed by: NURSE PRACTITIONER

## 2022-02-24 PROCEDURE — 93000 ELECTROCARDIOGRAM COMPLETE: CPT | Performed by: NURSE PRACTITIONER

## 2022-02-24 NOTE — PROGRESS NOTES
Date of Office Visit: 2022  Encounter Provider: Bre Aden, RACHAEL, APRN  Place of Service: [facility] CARDIOLOGY  Patient Name: Gary Grullon  :1945        Subjective:     Chief Complaint:  Atrial fibrillation, hypertension, cardiomyopathy      History of Present Illness:  Gary Grullon is a 76 y.o. male patient of Dr. Soliz.  This patient is new to me and I have reviewed his records.    Patient has a history of persistent atrial fibrillation, hypertension, hypercholesterolemia, chronic kidney disease, obstructive sleep apnea.    Patient has a history of known paroxysmal atrial fibrillation since .  This later became more persistent.  He had a Zio patch monitor 2021 showing persistent atrial fibrillation with mildly elevated rates.  Patient was symptomatic.  Cardioversion was attempted 2022 but was unsuccessful.  Echo 2022 showed EF of 21% with severe global hypokinesis with grade 2 diastolic dysfunction, mild to moderate mitral regurgitation, mild dilation of the aortic root and proximal ascending aorta at 3.9 cm.  Patient on metoprolol XL and Entresto.  Amiodarone.  Anticoagulated with Eliquis.  Patient underwent successful cardioversion 2018.  Post cardioversion EKG showed sinus bradycardia with heart rate of 41 bpm and left axis deviation.      Patient presents to office today for follow-up appointment.  Patient reports he is doing well overall since cardioversion.  He reports he stayed in sinus rhythm for about 5 days and then after playing tennis with back into atrial fibrillation and then back into sinus the next day but then had more atrial fibrillation and then back into sinus rhythm this morning.  He reports his heart rate has been staying in the 50s when in sinus rhythm.  Up into the 80s at times when in atrial fibrillation.  Metoprolol has been on hold due to bradycardia when in sinus rhythm.  He has been taking the amiodarone 200 mg  once a day.  Blood pressure seems overall to run 110s systolic.  Denies chest pain, shortness of breath, palpitations, edema, syncope, near syncope, falls, or fatigue.  He remains on blood thinner without bleeding issues.         Past Medical History:   Diagnosis Date   • Fracture of elbow    • HEDY (obstructive sleep apnea)     AHI 15/h supine 49/h     Past Surgical History:   Procedure Laterality Date   • CATARACT EXTRACTION     • COLONOSCOPY  04/04/2018    DR Gary Guerrero [place] [facility]   • CYST REMOVAL  1970   • EYE SURGERY     • PILONIDAL CYST DRAINAGE       Outpatient Medications Prior to Visit   Medication Sig Dispense Refill   • atorvastatin (LIPITOR) 20 MG tablet TAKE 1 TABLET DAILY 90 tablet 3   • cholecalciferol (VITAMIN D3) 1000 units tablet Take 1,000 Units by mouth Daily.     • Eliquis 5 MG tablet tablet TAKE 1 TABLET EVERY 12 HOURS 180 tablet 3   • metoprolol succinate XL (TOPROL-XL) 25 MG 24 hr tablet Take 2 tablets by mouth Daily.     • Omega 3 1200 MG capsule Take 1 tablet by mouth Every 12 (Twelve) Hours.     • sacubitril-valsartan (Entresto) 24-26 MG tablet Take 1 tablet by mouth 2 (Two) Times a Day. 60 tablet    • vitamin C (ASCORBIC ACID) 500 MG tablet Take 500 mg by mouth Daily.     • amiodarone (PACERONE) 200 MG tablet Take 1 tablet by mouth 2 (Two) Times a Day. 90 tablet 1     No facility-administered medications prior to visit.       Allergies as of 02/24/2022   • (No Known Allergies)     Social History     Socioeconomic History   • Marital status:    Tobacco Use   • Smoking status: Never Smoker   • Smokeless tobacco: Never Used   Vaping Use   • Vaping Use: Never used   Substance and Sexual Activity   • Alcohol use: Yes     Comment: 2 glasses of wine with dinner   • Drug use: No   • Sexual activity: Defer     Family History   Problem Relation Age of Onset   • COPD Mother    • Hypertension Father    • Diabetes Father    • Stroke Father    • Coronary artery disease Father    • Kidney  "disease Sister    • Diabetes Brother    • No Known Problems Maternal Aunt    • No Known Problems Maternal Uncle    • No Known Problems Paternal Aunt    • No Known Problems Paternal Uncle    • No Known Problems Maternal Grandmother    • No Known Problems Maternal Grandfather    • No Known Problems Paternal Grandmother    • No Known Problems Paternal Grandfather    • Anesthesia problems Neg Hx    • Broken bones Neg Hx    • Cancer Neg Hx    • Clotting disorder Neg Hx    • Collagen disease Neg Hx    • Dislocations Neg Hx    • Osteoporosis Neg Hx    • Rheumatologic disease Neg Hx    • Scoliosis Neg Hx    • Severe sprains Neg Hx        Review of Systems   Constitutional: Negative for malaise/fatigue.   Cardiovascular:        SEE HPI   Respiratory: Negative for shortness of breath.    Hematologic/Lymphatic: Negative for bleeding problem.   Musculoskeletal: Negative for falls.   Gastrointestinal: Negative for melena.   Genitourinary: Negative for hematuria.   Psychiatric/Behavioral: Negative for altered mental status.          Objective:     Vitals:    02/24/22 1034   BP: 110/72   BP Location: Left arm   Patient Position: Sitting   Pulse: 53   Weight: 107 kg (236 lb)   Height: 185.4 cm (73\")     Body mass index is 31.14 kg/m².      PHYSICAL EXAM:  Constitutional:       General: Not in acute distress.     Appearance: Well-developed. Not diaphoretic.   Eyes:      Pupils: Pupils are equal, round, and reactive to light.   HENT:      Head: Normocephalic and atraumatic.   Neck:      Vascular: No JVD.   Pulmonary:      Effort: Pulmonary effort is normal. No respiratory distress.      Breath sounds: Normal breath sounds. No wheezing. No rales.   Cardiovascular:      Normal rate. Regular rhythm.      Murmurs: There is no murmur.      No gallop. No click. No rub.   Edema:     Peripheral edema absent.   Abdominal:      General: Bowel sounds are normal. There is no distension.      Palpations: Abdomen is soft.   Musculoskeletal: Normal " range of motion.         General: No tenderness or deformity.      Cervical back: Neck supple. Skin:     General: Skin is warm and dry.      Findings: No erythema or rash.   Neurological:      Mental Status: Alert and oriented to person, place, and time.   Psychiatric:         Behavior: Behavior normal.         Judgment: Judgment normal.             ECG 12 Lead    Date/Time: 2/24/2022 10:42 AM  Performed by: Bre Aden DNP, PAULA  Authorized by: Bre Aden DNP, PAULA   Comparison: compared with previous ECG from 2/16/2022  Rhythm: sinus rhythm  BPM: 53  Conduction: incomplete right bundle branch block and left anterior fascicular block  Other findings: non-specific ST-T wave changes  Comments: No significant changes from previous EKGs              Assessment:       Diagnosis Plan   1. Paroxysmal atrial fibrillation (HCC)  ECG 12 Lead   2. History of cardioversion  ECG 12 Lead   3. Cardiomyopathy, unspecified type (HCC)  ECG 12 Lead         Plan:     1. Paroxysmal atrial fibrillation: Anticoagulated with Eliquis.  Patient underwent successful cardioversion 2/16/2022 to sinus rhythm.  Metoprolol was held afterwards due to post procedure bradycardia.  Amiodarone was decreased to 200 mg once a day.  Patient has continued on Eliquis.  He has been going in and out of atrial fibrillation.  In sinus rhythm in the office today.  HR in the 50s.  Plan of care discussed with Dr. Heydi MD.  Will continue 200mg amiodarone once daily.  Will check 24hr holter and look at hopefully restarting metoprolol xl 25mg if average HR high enough on monitor.    2. Cardiomyopathy: EF 21% on 2/2022 echo.  [place] to be secondary to A. fib with RVR.  On guideline directed medical therapy with metoprolol XL (on hold), Entresto.  BP on the lower side of normal, do not feel he would likely tolerate increased entresto at this point.  He is asymptomatic and euvolemic on exam.  Playing tennis without exertional symptoms or concerns.  Will recheck echo at 12 week umair.    3. Thoracic aortic ectasia: Mild at 3.9 cm on 2/2022 echo    Plan of care discussed with Dr. Heydi MD.     Patient to follow-up with Dr. Soliz in 3 months or follow-up sooner if needed for any new, recurrent, or worsening symptoms prior to that time. Discussed in detail signs/symptoms that warrant sooner call or follow-up to the office or ER visit.             Your medication list          Accurate as of February 24, 2022 11:59 PM. If you have any questions, ask your nurse or doctor.            CHANGE how you take these medications      Instructions Last Dose Given Next Dose Due   amiodarone 200 MG tablet  Commonly known as: PACERONE  What changed: when to take this  Changed by: Bre Aden, DNP, APRN      Take 1 tablet by mouth Daily.          CONTINUE taking these medications      Instructions Last Dose Given Next Dose Due   atorvastatin 20 MG tablet  Commonly known as: LIPITOR      TAKE 1 TABLET DAILY       cholecalciferol 25 MCG (1000 UT) tablet  Commonly known as: VITAMIN D3      Take 1,000 Units by mouth Daily.       Eliquis 5 MG tablet tablet  Generic drug: apixaban      TAKE 1 TABLET EVERY 12 HOURS       Entresto 24-26 MG tablet  Generic drug: sacubitril-valsartan      Take 1 tablet by mouth 2 (Two) Times a Day.       metoprolol succinate XL 25 MG 24 hr tablet  Commonly known as: TOPROL-XL      Take 2 tablets by mouth Daily.       Omega 3 1200 MG capsule      Take 1 tablet by mouth Every 12 (Twelve) Hours.       vitamin C 500 MG tablet  Commonly known as: ASCORBIC ACID      Take 500 mg by mouth Daily.             Where to Get Your Medications      These medications were sent to [facility] [address] - [address] AT [facility] [address] - [phone]  - [phone] FX  [address], [place] [address]    Phone: [phone]   · amiodarone 200 MG tablet         The above medication changes may not have  been made by this provider.  Patient's medication list was updated to reflect medications they are currently taking including medication changes made by other providers.            Thanks,    Bre Aden, DNP, APRN  02/24/2022         Dictated utilizing Dragon dictation

## 2022-02-25 RX ORDER — AMIODARONE HYDROCHLORIDE 200 MG/1
200 TABLET ORAL DAILY
Qty: 90 TABLET | Refills: 0 | Status: SHIPPED | OUTPATIENT
Start: 2022-02-25 | End: 2022-03-17 | Stop reason: SDUPTHER

## 2022-03-04 LAB
MAXIMAL PREDICTED HEART RATE: 145 BPM
STRESS TARGET HR: 123 BPM

## 2022-03-17 RX ORDER — AMIODARONE HYDROCHLORIDE 200 MG/1
200 TABLET ORAL DAILY
Qty: 90 TABLET | Refills: 3 | Status: SHIPPED | OUTPATIENT
Start: 2022-03-17 | End: 2022-08-25 | Stop reason: SDUPTHER

## 2022-03-22 ENCOUNTER — OFFICE VISIT (OUTPATIENT)
Dept: INTERNAL MEDICINE | Facility: CLINIC | Age: 77
End: 2022-03-22

## 2022-03-22 VITALS
WEIGHT: 238 LBS | SYSTOLIC BLOOD PRESSURE: 114 MMHG | TEMPERATURE: 97.1 F | BODY MASS INDEX: 31.54 KG/M2 | HEIGHT: 73 IN | DIASTOLIC BLOOD PRESSURE: 76 MMHG

## 2022-03-22 DIAGNOSIS — I10 ESSENTIAL HYPERTENSION: Chronic | ICD-10-CM

## 2022-03-22 DIAGNOSIS — I42.8 OTHER CARDIOMYOPATHY: Primary | ICD-10-CM

## 2022-03-22 DIAGNOSIS — R73.03 PRE-DIABETES: ICD-10-CM

## 2022-03-22 DIAGNOSIS — I48.0 PAROXYSMAL ATRIAL FIBRILLATION: Chronic | ICD-10-CM

## 2022-03-22 DIAGNOSIS — N18.31 STAGE 3A CHRONIC KIDNEY DISEASE: ICD-10-CM

## 2022-03-22 PROCEDURE — 99214 OFFICE O/P EST MOD 30 MIN: CPT | Performed by: INTERNAL MEDICINE

## 2022-03-22 RX ORDER — METOPROLOL SUCCINATE 25 MG/1
25 TABLET, EXTENDED RELEASE ORAL DAILY
COMMUNITY
End: 2022-05-17

## 2022-03-22 NOTE — PROGRESS NOTES
Subjective        Chief Complaint   Patient presents with   • Hypertension           Gary Grullon is a 76 y.o. male who presents for    Patient Active Problem List   Diagnosis   • Seborrheic keratosis   • Hypercholesteremia   • Essential hypertension   • Tubular adenoma of colon   • PMR (polymyalgia rheumatica) (HCC)   • HEDY on CPAP   • Pre-diabetes   • Class 1 obesity due to excess calories without serious comorbidity with body mass index (BMI) of 31.0 to 31.9 in adult   • Paroxysmal atrial fibrillation (HCC)   • Stage 3a chronic kidney disease (HCC)   • History of cardioversion   • Cardiomyopathy, unspecified (HCC)       History of Present Illness     He has had two cardioversions. His first did not work. He had a repeat that did work. He had an echo that showed an EF of 20 percent. He was started on Entresto. He had a holter in Feb that did not show any afib. He denies chest pain, dyspnea or swelling. He sleeps on 2 pillows. He went back into afib during the ulike game for 90 minutes and then went back into sinus. He was in afib last night for almost 3 hours. He had a positive COVID test before his first cardioversion; his only symptom was a cough. He is off of his steroids for PMR.  No Known Allergies    Current Outpatient Medications on File Prior to Visit   Medication Sig Dispense Refill   • amiodarone (PACERONE) 200 MG tablet Take 1 tablet by mouth Daily. 90 tablet 3   • atorvastatin (LIPITOR) 20 MG tablet TAKE 1 TABLET DAILY 90 tablet 3   • cholecalciferol (VITAMIN D3) 1000 units tablet Take 1,000 Units by mouth Daily.     • Eliquis 5 MG tablet tablet TAKE 1 TABLET EVERY 12 HOURS 180 tablet 3   • metoprolol succinate XL (TOPROL-XL) 25 MG 24 hr tablet Take 25 mg by mouth Daily.     • sacubitril-valsartan (ENTRESTO) 49-51 MG tablet Take 1 tablet by mouth. 1/2 tab daily     • Omega 3 1200 MG capsule Take 1 tablet by mouth Every 12 (Twelve) Hours.     • vitamin C (ASCORBIC ACID) 500 MG tablet Take 500 mg by mouth  Daily.     • [DISCONTINUED] metoprolol succinate XL (TOPROL-XL) 25 MG 24 hr tablet Take 2 tablets by mouth Daily.     • [DISCONTINUED] sacubitril-valsartan (Entresto) 24-26 MG tablet Take 1 tablet by mouth 2 (Two) Times a Day. 60 tablet      No current facility-administered medications on file prior to visit.       Past Medical History:   Diagnosis Date   • COVID-19 01/2022   • Fracture of elbow    • HEDY (obstructive sleep apnea)     AHI 15/h supine 49/h       Past Surgical History:   Procedure Laterality Date   • CATARACT EXTRACTION     • COLONOSCOPY  04/04/2018    DR Gary Guerrero [facility]   • CYST REMOVAL  1970   • EYE SURGERY     • PILONIDAL CYST DRAINAGE         Family History   Problem Relation Age of Onset   • COPD Mother    • Hypertension Father    • Diabetes Father    • Stroke Father    • Coronary artery disease Father    • Kidney disease Sister    • Diabetes Brother    • No Known Problems Maternal Aunt    • No Known Problems Maternal Uncle    • No Known Problems Paternal Aunt    • No Known Problems Paternal Uncle    • No Known Problems Maternal Grandmother    • No Known Problems Maternal Grandfather    • No Known Problems Paternal Grandmother    • No Known Problems Paternal Grandfather    • Anesthesia problems Neg Hx    • Broken bones Neg Hx    • Cancer Neg Hx    • Clotting disorder Neg Hx    • Collagen disease Neg Hx    • Dislocations Neg Hx    • Osteoporosis Neg Hx    • Rheumatologic disease Neg Hx    • Scoliosis Neg Hx    • Severe sprains Neg Hx        Social History     Socioeconomic History   • Marital status:    Tobacco Use   • Smoking status: Never Smoker   • Smokeless tobacco: Never Used   Vaping Use   • Vaping Use: Never used   Substance and Sexual Activity   • Alcohol use: Yes     Comment: 2 glasses of wine with dinner   • Drug use: No   • Sexual activity: Defer           The following portions of the patient's history were reviewed and updated as appropriate: problem list,  "allergies, current medications, past medical history, past family history, past social history and past surgical history.    Review of Systems    Immunization History   Administered Date(s) Administered   • COVID-19 (MODERNA) 1st, 2nd, 3rd Dose Only 01/13/2021, 02/10/2021, 08/25/2021   • Fluad Quad 65+ 09/13/2019, 10/10/2021   • Fluzone High Dose =>65 Years ([employer] ONLY) 09/01/2016, 09/06/2018, 09/01/2020   • Hepatitis A 03/21/2006, 10/25/2011   • Hepatitis B 03/21/2006, 07/08/2007, 02/02/2012   • MMR 03/13/2008   • Meningococcal, Unspecified 01/14/2013   • Pneumococcal Conjugate 13-Valent (PCV13) 08/31/2016   • Pneumococcal Polysaccharide (PPSV23) 12/10/2011   • Polio, Unspecified 01/15/2003   • Shingrix 08/12/2019, 06/19/2020   • Tdap 01/01/2012, 11/02/2019   • Typhoid, Unspecified 01/14/2013   • Yellow Fever 01/14/2013   • Zostavax 10/19/2012, 08/12/2019       Objective   Vitals:    03/22/22 1431   BP: 114/76   Temp: 97.1 °F (36.2 °C)   Weight: 108 kg (238 lb)   Height: 185.4 cm (73\")     Body mass index is 31.4 kg/m².  Physical Exam  Vitals reviewed.   Constitutional:       Appearance: He is well-developed.   HENT:      Head: Normocephalic and atraumatic.   Cardiovascular:      Rate and Rhythm: Normal rate and regular rhythm.      Heart sounds: Normal heart sounds, S1 normal and S2 normal.   Pulmonary:      Effort: Pulmonary effort is normal.      Breath sounds: Normal breath sounds.   Skin:     General: Skin is warm.   Neurological:      Mental Status: He is alert.   Psychiatric:         Behavior: Behavior normal.         Procedures    Assessment/Plan   Diagnoses and all orders for this visit:    1. Other cardiomyopathy (HCC) (Primary)    2. Paroxysmal atrial fibrillation (HCC)    3. Stage 3a chronic kidney disease (HCC)  -     Comprehensive Metabolic Panel; Future    4. Pre-diabetes  -     Hemoglobin A1c; Future    5. Essential hypertension  -     Lipid Panel With / Chol / HDL Ratio; Future           "   Reviewed holter, echo, and recent cmp, a1c, and psa. The PSA is improving. He will have a repeat echo in May. A1c is stable.    His left eyelid may have had some ptosis at beginning of office visit. He has not noticed it at home. He will ask his wife about it and let us know.  Return in about 4 months (around 7/22/2022) for Medicare Wellness, Lab Before FUP.

## 2022-04-23 ENCOUNTER — PATIENT MESSAGE (OUTPATIENT)
Dept: CARDIOLOGY | Facility: CLINIC | Age: 77
End: 2022-04-23

## 2022-04-25 NOTE — TELEPHONE ENCOUNTER
From: Gary Grullon  To: Yash Soliz MD  Sent: 4/23/2022 5:14 PM EDT  Subject: Entresto Refill    Dr. WILKINSON - When I started taking Entresto you provided me with a Physician’s sample. They were 49//51 Mg (28 tablets). I am now prescribed to take half tables …. So the 28 tablets has gone a long way. I am getting close to the time when I am going to need a refill. Can you please send a prescription to [facility]. Thank you

## 2022-04-27 DIAGNOSIS — I48.0 PAROXYSMAL ATRIAL FIBRILLATION: Primary | ICD-10-CM

## 2022-05-17 ENCOUNTER — LAB (OUTPATIENT)
Dept: LAB | Facility: HOSPITAL | Age: 77
End: 2022-05-17

## 2022-05-17 ENCOUNTER — OFFICE VISIT (OUTPATIENT)
Dept: CARDIOLOGY | Facility: CLINIC | Age: 77
End: 2022-05-17

## 2022-05-17 ENCOUNTER — HOSPITAL ENCOUNTER (OUTPATIENT)
Dept: CARDIOLOGY | Facility: HOSPITAL | Age: 77
Discharge: HOME | End: 2022-05-17

## 2022-05-17 VITALS
DIASTOLIC BLOOD PRESSURE: 78 MMHG | OXYGEN SATURATION: 98 % | HEIGHT: 73 IN | SYSTOLIC BLOOD PRESSURE: 114 MMHG | BODY MASS INDEX: 32.74 KG/M2 | HEART RATE: 60 BPM | WEIGHT: 247 LBS

## 2022-05-17 VITALS
DIASTOLIC BLOOD PRESSURE: 80 MMHG | HEIGHT: 73 IN | SYSTOLIC BLOOD PRESSURE: 112 MMHG | HEART RATE: 44 BPM | WEIGHT: 247.2 LBS | BODY MASS INDEX: 32.76 KG/M2

## 2022-05-17 DIAGNOSIS — N18.2 CKD (CHRONIC KIDNEY DISEASE) STAGE 2, GFR 60-89 ML/MIN: ICD-10-CM

## 2022-05-17 DIAGNOSIS — I42.8 NICM (NONISCHEMIC CARDIOMYOPATHY): ICD-10-CM

## 2022-05-17 DIAGNOSIS — R00.0 TACHYCARDIA INDUCED CARDIOMYOPATHY: ICD-10-CM

## 2022-05-17 DIAGNOSIS — I42.8 NICM (NONISCHEMIC CARDIOMYOPATHY): Primary | ICD-10-CM

## 2022-05-17 DIAGNOSIS — I48.19 ATRIAL FIBRILLATION, PERSISTENT: ICD-10-CM

## 2022-05-17 DIAGNOSIS — I43 TACHYCARDIA INDUCED CARDIOMYOPATHY: ICD-10-CM

## 2022-05-17 LAB
ANION GAP SERPL CALCULATED.3IONS-SCNC: 9.1 MMOL/L (ref 5–15)
BH CV ECHO MEAS - EDV(MOD-SP2): 187 ML
BH CV ECHO MEAS - EDV(MOD-SP4): 196 ML
BH CV ECHO MEAS - EF(MOD-BP): 61.3 %
BH CV ECHO MEAS - EF(MOD-SP2): 64.2 %
BH CV ECHO MEAS - EF(MOD-SP4): 61.2 %
BH CV ECHO MEAS - ESV(MOD-SP2): 67 ML
BH CV ECHO MEAS - ESV(MOD-SP4): 76 ML
BH CV ECHO MEAS - LV DIASTOLIC VOL/BSA (35-75): 83.3 CM2
BH CV ECHO MEAS - LV SYSTOLIC VOL/BSA (12-30): 32.3 CM2
BH CV ECHO MEAS - SI(MOD-SP2): 51 ML/M2
BH CV ECHO MEAS - SI(MOD-SP4): 51 ML/M2
BH CV ECHO MEAS - SV(MOD-SP2): 120 ML
BH CV ECHO MEAS - SV(MOD-SP4): 120 ML
BUN SERPL-MCNC: 25 MG/DL (ref 8–23)
BUN/CREAT SERPL: 15.8 (ref 7–25)
CALCIUM SPEC-SCNC: 9.5 MG/DL (ref 8.6–10.5)
CHLORIDE SERPL-SCNC: 108 MMOL/L (ref 98–107)
CO2 SERPL-SCNC: 22.9 MMOL/L (ref 22–29)
CREAT SERPL-MCNC: 1.58 MG/DL (ref 0.76–1.27)
EGFRCR SERPLBLD CKD-EPI 2021: 45.3 ML/MIN/1.73
GLUCOSE SERPL-MCNC: 113 MG/DL (ref 65–99)
MAXIMAL PREDICTED HEART RATE: 145 BPM
POTASSIUM SERPL-SCNC: 4.6 MMOL/L (ref 3.5–5.2)
SODIUM SERPL-SCNC: 140 MMOL/L (ref 136–145)
STRESS TARGET HR: 123 BPM

## 2022-05-17 PROCEDURE — 93000 ELECTROCARDIOGRAM COMPLETE: CPT | Performed by: INTERNAL MEDICINE

## 2022-05-17 PROCEDURE — 93308 TTE F-UP OR LMTD: CPT | Performed by: INTERNAL MEDICINE

## 2022-05-17 PROCEDURE — 93308 TTE F-UP OR LMTD: CPT

## 2022-05-17 PROCEDURE — 80048 BASIC METABOLIC PNL TOTAL CA: CPT

## 2022-05-17 PROCEDURE — 36415 COLL VENOUS BLD VENIPUNCTURE: CPT

## 2022-05-17 PROCEDURE — 25010000002 PERFLUTREN (DEFINITY) 8.476 MG IN SODIUM CHLORIDE (PF) 0.9 % 10 ML INJECTION: Performed by: INTERNAL MEDICINE

## 2022-05-17 PROCEDURE — 99214 OFFICE O/P EST MOD 30 MIN: CPT | Performed by: INTERNAL MEDICINE

## 2022-05-17 RX ORDER — METOPROLOL SUCCINATE 25 MG/1
12.5 TABLET, EXTENDED RELEASE ORAL DAILY
Qty: 30 TABLET | Refills: 11
Start: 2022-05-17 | End: 2022-05-18 | Stop reason: SDUPTHER

## 2022-05-17 RX ADMIN — PERFLUTREN 1 ML: 6.52 INJECTION, SUSPENSION INTRAVENOUS at 10:52

## 2022-05-17 NOTE — PROGRESS NOTES
PATIENTINFORMATION    Date of Office Visit: 2022  Encounter Provider: Yash Soliz MD  Place of Service: [facility] CARDIOLOGY  Patient Name: Gary Grullon  : 1945    Subjective:     Encounter Date:2022      Patient ID: Gary Grullon is a 76 y.o. male.    Chief Complaint   Patient presents with   • Follow-up   • Dizziness   • Fatigue     HPI  Mr. Grullon is a pleasant 76 years old retired [employer] came to cardiology clinic for follow-up visit.  He reports occasional lightheadedness that is fleeting otherwise denies any syncope.  He is reasonably active without any limiting exertional symptoms.  His heart rate has been persistently running on the lower side, dose was in the 30s but mostly in the upper 40s and 50s.  His baseline heart rate before A. fib was sinus bradycardia.  He is  compliant with current medical regimen and denies any significant side effects from medications. No ER visit or hospitalization since last clinic visit.   No orthopnea, PND, palpitations, syncope, extremity swelling, chest pain.    ROS  All systems reviewed and negative except as noted in HPI.    Past Medical History:   Diagnosis Date   • COVID-19 2022   • Fracture of elbow    • HEDY (obstructive sleep apnea)     AHI 15/h supine 49/h       Past Surgical History:   Procedure Laterality Date   • CATARACT EXTRACTION     • COLONOSCOPY  2018    DR Gary Guerrero [facility]   • CYST REMOVAL  1970   • EYE SURGERY     • PILONIDAL CYST DRAINAGE         Social History     Socioeconomic History   • Marital status:    Tobacco Use   • Smoking status: Never Smoker   • Smokeless tobacco: Never Used   Vaping Use   • Vaping Use: Never used   Substance and Sexual Activity   • Alcohol use: Yes     Comment: 2 glasses of wine with dinner   • Drug use: No   • Sexual activity: Defer       Family History   Problem Relation Age of Onset   • COPD Mother    • Hypertension Father    • Diabetes Father   "  • Stroke Father    • Coronary artery disease Father    • Kidney disease Sister    • Diabetes Brother    • No Known Problems Maternal Aunt    • No Known Problems Maternal Uncle    • No Known Problems Paternal Aunt    • No Known Problems Paternal Uncle    • No Known Problems Maternal Grandmother    • No Known Problems Maternal Grandfather    • No Known Problems Paternal Grandmother    • No Known Problems Paternal Grandfather    • Anesthesia problems Neg Hx    • Broken bones Neg Hx    • Cancer Neg Hx    • Clotting disorder Neg Hx    • Collagen disease Neg Hx    • Dislocations Neg Hx    • Osteoporosis Neg Hx    • Rheumatologic disease Neg Hx    • Scoliosis Neg Hx    • Severe sprains Neg Hx            ECG 12 Lead    Date/Time: 5/17/2022 9:40 AM  Performed by: Yash Soliz MD  Authorized by: Yash Soliz MD   Comparison: compared with previous ECG from 2/24/2022  Similar to previous ECG  Rhythm: sinus rhythm  Rate: normal  Conduction: conduction normal  ST Segments: ST segments normal  T Waves: T waves normal  QRS axis: normal  Other: no other findings    Clinical impression: normal ECG               Objective:     /80 (BP Location: Left arm, Patient Position: Sitting)   Pulse (!) 44   Ht 185.4 cm (73\")   Wt 112 kg (247 lb 3.2 oz)   BMI 32.61 kg/m²  Body mass index is 32.61 kg/m².     Constitutional:       General: Not in acute distress.     Appearance: Well-developed. Not diaphoretic.   Eyes:      Pupils: Pupils are equal, round, and reactive to light.   HENT:      Head: Normocephalic and atraumatic.   Neck:      Thyroid: No thyromegaly.   Pulmonary:      Effort: Pulmonary effort is normal. No respiratory distress.      Breath sounds: Normal breath sounds. No wheezing. No rales.   Chest:      Chest wall: Not tender to palpatation.   Cardiovascular:      Bradycardia present. Regular rhythm.      No gallop.   Pulses:     Intact distal pulses.   Edema:     Peripheral edema absent.   Abdominal: "      General: Bowel sounds are normal. There is no distension.      Palpations: Abdomen is soft.      Tenderness: There is no guarding.   Musculoskeletal: Normal range of motion.         General: No deformity.      Cervical back: Normal range of motion and neck supple. Skin:     General: Skin is warm and dry.      Findings: No rash.   Neurological:      Mental Status: Alert and oriented to person, place, and time.      Cranial Nerves: No cranial nerve deficit.      Deep Tendon Reflexes: Reflexes are normal and symmetric.   Psychiatric:         Judgment: Judgment normal.         Review Of Data: I have reviewed pertinent recent labs, images and documents and pertinent findings included in HPI or assessment below.    Lipid Panel    Lipid Panel 5/28/21   Total Cholesterol 148   Triglycerides 82   HDL Cholesterol 66 (A)   VLDL Cholesterol 16   LDL Cholesterol  66   (A) Abnormal value       Comments are available for some flowsheets but are not being displayed.                Assessment/Plan:         1.  Probably nonischemic cardiomyopathy from tach induced cardiomyopathy/persistent atrial fibrillation  2.  Persistent atrial fibrillation status post successful cardioversion on 2/16/2018 for failed cardioversion on 2/1/2022  3.  Chronic kidney disease stage III  4.  Obstructive sleep apnea on CPAP- compliant with CPAP   5.  Hypercholesterolemia on a statin    Vital signs within range today except heart rate of 44 bpm, sinus bradycardia.  Otherwise cardiovascular examination is normal.  I will reduce metoprolol by half.  Otherwise continue amiodarone and Eliquis.  BMP and limited echo today    Limited echo today revealed completely recovered left ventricular ejection fraction.      Return to clinic in 6 months or sooner with any concerning symptoms.    Diagnosis and plan of care discussed with patient and verbalized understanding.            Your medication list          Accurate as of May 17, 2022 10:08 AM. If you have any  questions, ask your nurse or doctor.            CONTINUE taking these medications      Instructions Last Dose Given Next Dose Due   amiodarone 200 MG tablet  Commonly known as: PACERONE      Take 1 tablet by mouth Daily.       atorvastatin 20 MG tablet  Commonly known as: LIPITOR      TAKE 1 TABLET DAILY       cholecalciferol 25 MCG (1000 UT) tablet  Commonly known as: VITAMIN D3      Take 1,000 Units by mouth Daily.       Eliquis 5 MG tablet tablet  Generic drug: apixaban      TAKE 1 TABLET EVERY 12 HOURS       metoprolol succinate XL 25 MG 24 hr tablet  Commonly known as: TOPROL-XL      Take 25 mg by mouth Daily.       Omega 3 1200 MG capsule      Take 1 tablet by mouth Every 12 (Twelve) Hours.       sacubitril-valsartan 49-51 MG tablet  Commonly known as: ENTRESTO      Take 1 tablet by mouth 2 (Two) Times a Day.       vitamin C 500 MG tablet  Commonly known as: ASCORBIC ACID      Take 500 mg by mouth Daily.                  Yash Soliz MD  05/17/22  10:08 EDT

## 2022-05-18 DIAGNOSIS — I42.8 NICM (NONISCHEMIC CARDIOMYOPATHY): Primary | ICD-10-CM

## 2022-05-18 RX ORDER — METOPROLOL SUCCINATE 25 MG/1
12.5 TABLET, EXTENDED RELEASE ORAL DAILY
Qty: 90 TABLET | Refills: 3
Start: 2022-05-18 | End: 2022-05-31 | Stop reason: SDUPTHER

## 2022-05-18 NOTE — PROGRESS NOTES
Please notify patient that his kidney function is overall stable. I have placed another order for BMP to be done in 3 months at the end of August. Follow up in 6 months. I will copy Sheba for scheduling. Let me know with any patient questions. Thank you.

## 2022-05-20 RX ORDER — METOPROLOL SUCCINATE 25 MG/1
12.5 TABLET, EXTENDED RELEASE ORAL DAILY
Qty: 45 TABLET | Refills: 11
Start: 2022-05-20 | End: 2022-05-31 | Stop reason: SDUPTHER

## 2022-05-20 RX ORDER — SACUBITRIL AND VALSARTAN 24; 26 MG/1; MG/1
1 TABLET, FILM COATED ORAL 2 TIMES DAILY
Qty: 180 TABLET | Refills: 3
Start: 2022-05-20 | End: 2022-06-06 | Stop reason: SDUPTHER

## 2022-06-06 RX ORDER — SACUBITRIL AND VALSARTAN 24; 26 MG/1; MG/1
1 TABLET, FILM COATED ORAL 2 TIMES DAILY
Qty: 180 TABLET | Refills: 3
Start: 2022-06-06 | End: 2022-11-04

## 2022-06-06 RX ORDER — METOPROLOL SUCCINATE 25 MG/1
12.5 TABLET, EXTENDED RELEASE ORAL DAILY
Qty: 90 TABLET | Refills: 3
Start: 2022-06-06 | End: 2022-06-06 | Stop reason: SDUPTHER

## 2022-06-06 RX ORDER — METOPROLOL SUCCINATE 25 MG/1
12.5 TABLET, EXTENDED RELEASE ORAL DAILY
Qty: 90 TABLET | Refills: 3 | Status: SHIPPED | OUTPATIENT
Start: 2022-06-06 | End: 2022-11-02 | Stop reason: SDUPTHER

## 2022-06-06 NOTE — TELEPHONE ENCOUNTER
This pt is wanting refills on entresto 49/51 but you discontinued it and put him on 24/26.  Which one would you like him on? Please advise.  Thanks. HB

## 2022-06-06 NOTE — TELEPHONE ENCOUNTER
Sent in refill for metoprolol to [facility] per pt request.  just waiting to hear back from the doctor to see which entresto pt is supposed to be on.  Thanks HB

## 2022-07-29 ENCOUNTER — OFFICE VISIT (OUTPATIENT)
Dept: INTERNAL MEDICINE | Facility: CLINIC | Age: 77
End: 2022-07-29

## 2022-07-29 VITALS
HEIGHT: 73 IN | HEART RATE: 54 BPM | TEMPERATURE: 97.8 F | DIASTOLIC BLOOD PRESSURE: 78 MMHG | SYSTOLIC BLOOD PRESSURE: 126 MMHG | WEIGHT: 245 LBS | BODY MASS INDEX: 32.47 KG/M2

## 2022-07-29 DIAGNOSIS — R00.0 TACHYCARDIA INDUCED CARDIOMYOPATHY: ICD-10-CM

## 2022-07-29 DIAGNOSIS — I43 TACHYCARDIA INDUCED CARDIOMYOPATHY: ICD-10-CM

## 2022-07-29 DIAGNOSIS — N18.30 CHRONIC RENAL INSUFFICIENCY, STAGE 3 (MODERATE): ICD-10-CM

## 2022-07-29 DIAGNOSIS — I10 ESSENTIAL HYPERTENSION: Chronic | ICD-10-CM

## 2022-07-29 DIAGNOSIS — R73.03 PRE-DIABETES: ICD-10-CM

## 2022-07-29 DIAGNOSIS — Z00.00 ENCOUNTER FOR SUBSEQUENT ANNUAL WELLNESS VISIT (AWV) IN MEDICARE PATIENT: Primary | ICD-10-CM

## 2022-07-29 PROCEDURE — G0439 PPPS, SUBSEQ VISIT: HCPCS | Performed by: INTERNAL MEDICINE

## 2022-07-29 PROCEDURE — 1170F FXNL STATUS ASSESSED: CPT | Performed by: INTERNAL MEDICINE

## 2022-07-29 PROCEDURE — 1160F RVW MEDS BY RX/DR IN RCRD: CPT | Performed by: INTERNAL MEDICINE

## 2022-07-29 PROCEDURE — 99213 OFFICE O/P EST LOW 20 MIN: CPT | Performed by: INTERNAL MEDICINE

## 2022-07-29 NOTE — PROGRESS NOTES
The ABCs of the Annual Wellness Visit  Subsequent Medicare Wellness Visit    Chief Complaint   Patient presents with   • Medicare Wellness-subsequent      Subjective    History of Present Illness:  Gary Grullon is a 76 y.o. male who presents for a Subsequent Medicare Wellness Visit.  His heart is in rhythm. He denies dysuria, hematuria, frequency, chest pain or dyspnea. His BP has been 125/70. He plays tennis 4 days per week without any problem. He is off Prednisone. He can be light headed sometimes when he stands up too quickly.  The following portions of the patient's history were reviewed and   updated as appropriate: allergies, current medications, past family history, past medical history, past social history, past surgical history and problem list.    Compared to one year ago, the patient feels his physical   health is the same.    Compared to one year ago, the patient feels his mental   health is the same.    Recent Hospitalizations:  He was not admitted to the hospital during the last year.       Current Medical Providers:  Patient Care Team:  Rommel Wilkerson MD as PCP - General (Internal Medicine)    Outpatient Medications Prior to Visit   Medication Sig Dispense Refill   • amiodarone (PACERONE) 200 MG tablet Take 1 tablet by mouth Daily. 90 tablet 3   • atorvastatin (LIPITOR) 20 MG tablet TAKE 1 TABLET DAILY 90 tablet 3   • cholecalciferol (VITAMIN D3) 1000 units tablet Take 1,000 Units by mouth Daily.     • Eliquis 5 MG tablet tablet TAKE 1 TABLET EVERY 12 HOURS 180 tablet 3   • metoprolol succinate XL (TOPROL-XL) 25 MG 24 hr tablet Take 0.5 tablets by mouth Daily. 90 tablet 3   • Omega 3 1200 MG capsule Take 1 tablet by mouth Every 12 (Twelve) Hours.     • sacubitril-valsartan (Entresto) 24-26 MG tablet Take 1 tablet by mouth 2 (Two) Times a Day. 180 tablet 3   • vitamin C (ASCORBIC ACID) 500 MG tablet Take 500 mg by mouth Daily.       No facility-administered medications prior to visit.       No  "opioid medication identified on active medication list. I have reviewed chart for other potential  high risk medication/s and harmful drug interactions in the elderly.          Aspirin is not on active medication list.  Aspirin use is not indicated based on review of current medical condition/s. Risk of harm outweighs potential benefits.  .    Patient Active Problem List   Diagnosis   • Seborrheic keratosis   • Hypercholesteremia   • Essential hypertension   • Tubular adenoma of colon   • PMR (polymyalgia rheumatica) (HCC)   • Atrial fibrillation, persistent (HCC)   • HEDY on CPAP   • Pre-diabetes   • Class 1 obesity due to excess calories without serious comorbidity with body mass index (BMI) of 31.0 to 31.9 in adult   • Paroxysmal atrial fibrillation (HCC)   • Stage 3a chronic kidney disease (HCC)   • History of cardioversion   • Tachycardia induced cardiomyopathy (HCC)     Advance Care Planning  Advance Directive is not on file.  ACP discussion was held with the patient during this visit. Patient has an advance directive (not in EMR), copy requested.          Objective    Vitals:    07/29/22 1532   BP: 126/78   Pulse: 54   Temp: 97.8 °F (36.6 °C)   Weight: 111 kg (245 lb)   Height: 185.4 cm (72.99\")     Estimated body mass index is 32.33 kg/m² as calculated from the following:    Height as of this encounter: 185.4 cm (72.99\").    Weight as of this encounter: 111 kg (245 lb).    BMI is >= 30 and <35. (Class 1 Obesity). The following options were offered after discussion;: exercise counseling/recommendations      Does the patient have evidence of cognitive impairment? No    Physical Exam  Vitals reviewed.   Constitutional:       Appearance: He is well-developed.   HENT:      Head: Normocephalic and atraumatic.   Neck:      Vascular: No carotid bruit.   Cardiovascular:      Rate and Rhythm: Regular rhythm. Bradycardia present.      Heart sounds: Normal heart sounds, S1 normal and S2 normal.   Pulmonary:      Effort: " Pulmonary effort is normal.      Breath sounds: Normal breath sounds.   Abdominal:      General: There is no distension.      Palpations: There is no mass.   Skin:     General: Skin is warm.   Neurological:      Mental Status: He is alert.   Psychiatric:         Behavior: Behavior normal.       Lab Results   Component Value Date    CHLPL 170 07/22/2022    TRIG 80 07/22/2022    HDL 70 07/22/2022    LDL 85 07/22/2022    VLDL 15 07/22/2022    HGBA1C 5.7 (H) 07/22/2022            HEALTH RISK ASSESSMENT    Smoking Status:  Social History     Tobacco Use   Smoking Status Never Smoker   Smokeless Tobacco Never Used     Alcohol Consumption:  Social History     Substance and Sexual Activity   Alcohol Use Yes    Comment: 2 glasses of wine with dinner     Fall Risk Screen:    LAURA Fall Risk Assessment was completed, and patient is at LOW risk for falls.Assessment completed on:7/29/2022    Depression Screening:  PHQ-2/PHQ-9 Depression Screening 7/29/2022   Retired PHQ-9 Total Score -   Retired Total Score -   Little Interest or Pleasure in Doing Things 0-->not at all   Feeling Down, Depressed or Hopeless 0-->not at all   PHQ-9: Brief Depression Severity Measure Score 0       Health Habits and Functional and Cognitive Screening:  Functional & Cognitive Status 7/29/2022   Do you have difficulty preparing food and eating? No   Do you have difficulty bathing yourself, getting dressed or grooming yourself? No   Do you have difficulty using the toilet? No   Do you have difficulty moving around from place to place? No   Do you have trouble with steps or getting out of a bed or a chair? No   Current Diet Well Balanced Diet   Dental Exam Up to date        Dental Exam Comment -   Eye Exam Up to date   Exercise (times per week) 7 times per week   Current Exercises Include Yard Work;Walking   Current Exercise Activities Include -   Do you need help using the phone?  No   Are you deaf or do you have serious difficulty hearing?  No   Do  you need help with transportation? No   Do you need help shopping? No   Do you need help preparing meals?  No   Do you need help with housework?  No   Do you need help with laundry? No   Do you need help taking your medications? No   Do you need help managing money? No   Do you ever drive or ride in a car without wearing a seat belt? No   Have you felt unusual stress, anger or loneliness in the last month? No   Who do you live with? Spouse   If you need help, do you have trouble finding someone available to you? No   Have you been bothered in the last four weeks by sexual problems? No   Do you have difficulty concentrating, remembering or making decisions? No       Age-appropriate Screening Schedule:  Refer to the list below for future screening recommendations based on patient's age, sex and/or medical conditions. Orders for these recommended tests are listed in the plan section. The patient has been provided with a written plan.    Health Maintenance   Topic Date Due   • INFLUENZA VACCINE  10/01/2022   • LIPID PANEL  07/22/2023   • TDAP/TD VACCINES (3 - Td or Tdap) 11/02/2029   • ZOSTER VACCINE  Completed              Assessment & Plan   CMS Preventative Services Quick Reference  Risk Factors Identified During Encounter  None Identified  The above risks/problems have been discussed with the patient.  Follow up actions/plans if indicated are seen below in the Assessment/Plan Section.  Pertinent information has been shared with the patient in the After Visit Summary.    Diagnoses and all orders for this visit:    1. Encounter for subsequent annual wellness visit (AWV) in Medicare patient (Primary)    2. Pre-diabetes    3. Tachycardia induced cardiomyopathy (HCC)    4. Essential hypertension    5. Chronic renal insufficiency, stage 3 (moderate) (HCC)  -     Basic Metabolic Panel; Future  -     Urinalysis With Culture If Indicated -; Future        Follow Up:   Return in about 6 weeks (around 9/9/2022).     An After  Visit Summary and PPPS were made available to the patient.                 Reviewed cmp, flp, and a1c. Cr has bumped. I will decrease his Entresto to once per day and repeat bmp with UA in a week. He may need a renal USN at some point. He does not use NSAIDS. This likely will also improve his symptoms with standing. Discussed drinking 48 ounces per day. I have a call into his cardiologist.

## 2022-08-05 DIAGNOSIS — N18.30 CHRONIC RENAL INSUFFICIENCY, STAGE 3 (MODERATE): Primary | ICD-10-CM

## 2022-08-11 LAB
25(OH)D3+25(OH)D2 SERPL-MCNC: 45.6 NG/ML (ref 30–100)
ALBUMIN MFR UR ELPH: 40.3 %
ALBUMIN SERPL ELPH-MCNC: 3.6 G/DL (ref 2.9–4.4)
ALBUMIN/GLOB SERPL: 1.3 {RATIO} (ref 0.7–1.7)
ALPHA1 GLOB MFR UR ELPH: 4.9 %
ALPHA1 GLOB SERPL ELPH-MCNC: 0.2 G/DL (ref 0–0.4)
ALPHA2 GLOB MFR UR ELPH: 20.3 %
ALPHA2 GLOB SERPL ELPH-MCNC: 0.7 G/DL (ref 0.4–1)
B-GLOBULIN MFR UR ELPH: 24.8 %
B-GLOBULIN SERPL ELPH-MCNC: 1.1 G/DL (ref 0.7–1.3)
GAMMA GLOB MFR UR ELPH: 9.7 %
GAMMA GLOB SERPL ELPH-MCNC: 0.7 G/DL (ref 0.4–1.8)
GLOBULIN SER CALC-MCNC: 2.7 G/DL (ref 2.2–3.9)
LABORATORY COMMENT REPORT: NORMAL
LABORATORY COMMENT REPORT: NORMAL
M PROTEIN MFR UR ELPH: NORMAL %
M PROTEIN SERPL ELPH-MCNC: NORMAL G/DL
PHOSPHATE SERPL-MCNC: 3.6 MG/DL (ref 2.8–4.1)
PROT PATTERN SERPL ELPH-IMP: NORMAL
PROT SERPL-MCNC: 6.3 G/DL (ref 6–8.5)
PROT UR-MCNC: 8 MG/DL
PTH-INTACT SERPL-MCNC: 51 PG/ML (ref 15–65)

## 2022-08-23 ENCOUNTER — LAB (OUTPATIENT)
Dept: LAB | Facility: HOSPITAL | Age: 77
End: 2022-08-23

## 2022-08-23 DIAGNOSIS — I42.8 NICM (NONISCHEMIC CARDIOMYOPATHY): ICD-10-CM

## 2022-08-23 LAB
ANION GAP SERPL CALCULATED.3IONS-SCNC: 9 MMOL/L (ref 5–15)
BUN SERPL-MCNC: 17 MG/DL (ref 8–23)
BUN/CREAT SERPL: 11.6 (ref 7–25)
CALCIUM SPEC-SCNC: 9.2 MG/DL (ref 8.6–10.5)
CHLORIDE SERPL-SCNC: 107 MMOL/L (ref 98–107)
CO2 SERPL-SCNC: 22 MMOL/L (ref 22–29)
CREAT SERPL-MCNC: 1.46 MG/DL (ref 0.76–1.27)
EGFRCR SERPLBLD CKD-EPI 2021: 49.5 ML/MIN/1.73
GLUCOSE SERPL-MCNC: 105 MG/DL (ref 65–99)
POTASSIUM SERPL-SCNC: 4.3 MMOL/L (ref 3.5–5.2)
SODIUM SERPL-SCNC: 138 MMOL/L (ref 136–145)

## 2022-08-23 PROCEDURE — 80048 BASIC METABOLIC PNL TOTAL CA: CPT

## 2022-08-23 PROCEDURE — 36415 COLL VENOUS BLD VENIPUNCTURE: CPT

## 2022-08-24 DIAGNOSIS — I48.19 PERSISTENT ATRIAL FIBRILLATION: ICD-10-CM

## 2022-08-24 RX ORDER — ATORVASTATIN CALCIUM 20 MG/1
TABLET, FILM COATED ORAL
Qty: 90 TABLET | Refills: 3 | Status: SHIPPED | OUTPATIENT
Start: 2022-08-24

## 2022-08-24 RX ORDER — APIXABAN 5 MG/1
TABLET, FILM COATED ORAL
Qty: 180 TABLET | Refills: 3 | Status: SHIPPED | OUTPATIENT
Start: 2022-08-24

## 2022-08-25 ENCOUNTER — HOSPITAL ENCOUNTER (OUTPATIENT)
Dept: ULTRASOUND IMAGING | Facility: HOSPITAL | Age: 77
Discharge: HOME | End: 2022-08-25
Admitting: INTERNAL MEDICINE

## 2022-08-25 PROCEDURE — 76775 US EXAM ABDO BACK WALL LIM: CPT

## 2022-08-25 RX ORDER — AMIODARONE HYDROCHLORIDE 200 MG/1
200 TABLET ORAL DAILY
Qty: 90 TABLET | Refills: 3 | Status: SHIPPED | OUTPATIENT
Start: 2022-08-25 | End: 2023-01-12

## 2022-08-29 ENCOUNTER — TELEPHONE (OUTPATIENT)
Dept: CARDIOLOGY | Facility: CLINIC | Age: 77
End: 2022-08-29

## 2022-08-29 NOTE — TELEPHONE ENCOUNTER
----- Message from Yash Soliz MD sent at 8/28/2022  7:11 PM EDT -----  Please notify patient that recent BMP shows improving kidney function. Thanks

## 2022-08-29 NOTE — TELEPHONE ENCOUNTER
Called and left VM. Will continue to try to reach patient.     Saige Byrne RN  Triage [facility] [place]

## 2022-08-30 NOTE — TELEPHONE ENCOUNTER
Reviewed results with Gary Grullon and patient verbalized understanding of results.    Thank you,  Dora Burks RN  Triage Nurse G

## 2022-09-06 ENCOUNTER — TELEPHONE (OUTPATIENT)
Dept: INTERNAL MEDICINE | Facility: CLINIC | Age: 77
End: 2022-09-06

## 2022-09-12 ENCOUNTER — HOSPITAL ENCOUNTER (OUTPATIENT)
Dept: GENERAL RADIOLOGY | Facility: HOSPITAL | Age: 77
Discharge: HOME | End: 2022-09-12
Admitting: INTERNAL MEDICINE

## 2022-09-12 ENCOUNTER — OFFICE VISIT (OUTPATIENT)
Dept: INTERNAL MEDICINE | Facility: CLINIC | Age: 77
End: 2022-09-12

## 2022-09-12 VITALS
BODY MASS INDEX: 32.2 KG/M2 | HEIGHT: 73 IN | HEART RATE: 54 BPM | WEIGHT: 243 LBS | DIASTOLIC BLOOD PRESSURE: 72 MMHG | SYSTOLIC BLOOD PRESSURE: 132 MMHG | TEMPERATURE: 97.8 F

## 2022-09-12 DIAGNOSIS — I48.0 PAROXYSMAL ATRIAL FIBRILLATION: Chronic | ICD-10-CM

## 2022-09-12 DIAGNOSIS — I10 ESSENTIAL HYPERTENSION: Chronic | ICD-10-CM

## 2022-09-12 DIAGNOSIS — R73.03 PRE-DIABETES: ICD-10-CM

## 2022-09-12 DIAGNOSIS — Z79.899 LONG TERM CURRENT USE OF AMIODARONE: Primary | ICD-10-CM

## 2022-09-12 DIAGNOSIS — N18.31 STAGE 3A CHRONIC KIDNEY DISEASE: ICD-10-CM

## 2022-09-12 PROCEDURE — 99214 OFFICE O/P EST MOD 30 MIN: CPT | Performed by: INTERNAL MEDICINE

## 2022-09-12 PROCEDURE — 71046 X-RAY EXAM CHEST 2 VIEWS: CPT

## 2022-09-12 NOTE — PROGRESS NOTES
Subjective        Chief Complaint   Patient presents with   • Hypertension           Gary Grullon is a 77 y.o. male who presents for    Patient Active Problem List   Diagnosis   • Seborrheic keratosis   • Hypercholesteremia   • Essential hypertension   • Tubular adenoma of colon   • PMR (polymyalgia rheumatica) (HCC)   • Atrial fibrillation, persistent (HCC)   • HEDY on CPAP   • Pre-diabetes   • Class 1 obesity due to excess calories without serious comorbidity with body mass index (BMI) of 31.0 to 31.9 in adult   • Paroxysmal atrial fibrillation (HCC)   • Stage 3a chronic kidney disease (HCC)   • History of cardioversion   • Tachycardia induced cardiomyopathy (HCC)       History of Present Illness     He did see renal who felt that part of his renal insufficiency may be related to his Entresto. He has been doing PT three days per week for his left knee. He denies chest pain or dyspnea. His wife told him that his left eyelid has been more droopy for years.  No Known Allergies    Current Outpatient Medications on File Prior to Visit   Medication Sig Dispense Refill   • amiodarone (PACERONE) 200 MG tablet Take 1 tablet by mouth Daily. 90 tablet 3   • atorvastatin (LIPITOR) 20 MG tablet TAKE 1 TABLET DAILY 90 tablet 3   • cholecalciferol (VITAMIN D3) 1000 units tablet Take 1,000 Units by mouth Daily.     • Eliquis 5 MG tablet tablet TAKE 1 TABLET EVERY 12 HOURS 180 tablet 3   • metoprolol succinate XL (TOPROL-XL) 25 MG 24 hr tablet Take 0.5 tablets by mouth Daily. 90 tablet 3   • Omega 3 1200 MG capsule Take 1 tablet by mouth Every 12 (Twelve) Hours.     • sacubitril-valsartan (Entresto) 24-26 MG tablet Take 1 tablet by mouth 2 (Two) Times a Day. 180 tablet 3   • sacubitril-valsartan (ENTRESTO) 24-26 MG tablet Take 1 tablet by mouth Daily.     • vitamin C (ASCORBIC ACID) 500 MG tablet Take 500 mg by mouth Daily.       No current facility-administered medications on file prior to visit.       Past Medical History:    Diagnosis Date   • COVID-19 01/2022   • Fracture of elbow    • HEDY (obstructive sleep apnea)     AHI 15/h supine 49/h       Past Surgical History:   Procedure Laterality Date   • CATARACT EXTRACTION     • COLONOSCOPY  04/04/2018    DR Gary Guerrero [facility]   • CYST REMOVAL  1970   • EYE SURGERY     • PILONIDAL CYST DRAINAGE         Family History   Problem Relation Age of Onset   • COPD Mother    • Hypertension Father    • Diabetes Father    • Stroke Father    • Coronary artery disease Father    • Kidney disease Sister    • Diabetes Brother    • No Known Problems Maternal Aunt    • No Known Problems Maternal Uncle    • No Known Problems Paternal Aunt    • No Known Problems Paternal Uncle    • No Known Problems Maternal Grandmother    • No Known Problems Maternal Grandfather    • No Known Problems Paternal Grandmother    • No Known Problems Paternal Grandfather    • Anesthesia problems Neg Hx    • Broken bones Neg Hx    • Cancer Neg Hx    • Clotting disorder Neg Hx    • Collagen disease Neg Hx    • Dislocations Neg Hx    • Osteoporosis Neg Hx    • Rheumatologic disease Neg Hx    • Scoliosis Neg Hx    • Severe sprains Neg Hx        Social History     Socioeconomic History   • Marital status:    Tobacco Use   • Smoking status: Never Smoker   • Smokeless tobacco: Never Used   Vaping Use   • Vaping Use: Never used   Substance and Sexual Activity   • Alcohol use: Yes     Comment: 2 glasses of wine with dinner   • Drug use: No   • Sexual activity: Defer           The following portions of the patient's history were reviewed and updated as appropriate: problem list, allergies, current medications, past medical history, past family history, past social history and past surgical history.    Review of Systems    Immunization History   Administered Date(s) Administered   • COVID-19 (MODERNA) 1st, 2nd, 3rd Dose Only 01/13/2021, 02/10/2021, 08/25/2021   • COVID-19 (MODERNA) BOOSTER 04/16/2022   • Fluad Quad 65+  "09/13/2019, 10/10/2021   • Fluzone High Dose =>65 Years ([employer] ONLY) 09/01/2016, 09/06/2018, 09/01/2020   • Hepatitis A 03/21/2006, 10/25/2011   • Hepatitis B 03/21/2006, 07/08/2007, 02/02/2012   • MMR 03/13/2008   • Meningococcal, Unspecified 01/14/2013   • Pneumococcal Conjugate 13-Valent (PCV13) 08/31/2016   • Pneumococcal Polysaccharide (PPSV23) 12/10/2011   • Polio, Unspecified 01/15/2003   • Shingrix 08/12/2019, 06/19/2020   • Tdap 01/01/2012, 11/02/2019   • Typhoid, Unspecified 01/14/2013   • Yellow Fever 01/14/2013   • Zostavax 10/19/2012, 08/12/2019       Objective   Vitals:    09/12/22 1442   BP: 132/72   Pulse: 54   Temp: 97.8 °F (36.6 °C)   Weight: 110 kg (243 lb)   Height: 185.4 cm (72.99\")     Body mass index is 32.07 kg/m².  Physical Exam  Vitals reviewed.   Constitutional:       Appearance: He is well-developed.   HENT:      Head: Normocephalic and atraumatic.   Cardiovascular:      Rate and Rhythm: Normal rate and regular rhythm.      Heart sounds: Normal heart sounds, S1 normal and S2 normal.   Pulmonary:      Effort: Pulmonary effort is normal.      Breath sounds: Normal breath sounds.   Skin:     General: Skin is warm.   Neurological:      Mental Status: He is alert.   Psychiatric:         Behavior: Behavior normal.         Procedures    Assessment & Plan   Diagnoses and all orders for this visit:    1. Long term current use of amiodarone (Primary)  -     XR Chest PA & Lateral  -     TSH    2. Essential hypertension    3. Paroxysmal atrial fibrillation (HCC)    4. Pre-diabetes  -     Hemoglobin A1c; Future    5. Stage 3a chronic kidney disease (HCC)  -     Comprehensive Metabolic Panel; Future               Bmp noted. Cr is improving. Check CXR and TSH given on amiodarone. BP is on upper limit nl. I reviewed renal consult.  Return in about 4 months (around 1/12/2023), or 30 minutes, for Lab Today, Lab Before FUP.  "

## 2022-09-13 LAB — TSH SERPL DL<=0.005 MIU/L-ACNC: 35.7 UIU/ML (ref 0.27–4.2)

## 2022-09-15 DIAGNOSIS — E03.2 HYPOTHYROIDISM DUE TO AMIODARONE: Primary | ICD-10-CM

## 2022-09-15 DIAGNOSIS — T46.2X1A HYPOTHYROIDISM DUE TO AMIODARONE: Primary | ICD-10-CM

## 2022-09-15 LAB
T4 FREE SERPL-MCNC: 0.5 NG/DL (ref 0.93–1.7)
WRITTEN AUTHORIZATION: NORMAL

## 2022-09-15 RX ORDER — LEVOTHYROXINE SODIUM 0.07 MG/1
75 TABLET ORAL DAILY
Qty: 30 TABLET | Refills: 3 | Status: SHIPPED | OUTPATIENT
Start: 2022-09-15 | End: 2022-09-16 | Stop reason: SDUPTHER

## 2022-09-16 DIAGNOSIS — E03.2 HYPOTHYROIDISM DUE TO AMIODARONE: ICD-10-CM

## 2022-09-16 DIAGNOSIS — T46.2X1A HYPOTHYROIDISM DUE TO AMIODARONE: ICD-10-CM

## 2022-09-16 RX ORDER — LEVOTHYROXINE SODIUM 0.07 MG/1
75 TABLET ORAL DAILY
Qty: 30 TABLET | Refills: 3 | Status: SHIPPED | OUTPATIENT
Start: 2022-09-16 | End: 2022-09-29

## 2022-09-29 ENCOUNTER — TELEPHONE (OUTPATIENT)
Dept: INTERNAL MEDICINE | Facility: CLINIC | Age: 77
End: 2022-09-29

## 2022-09-29 ENCOUNTER — LAB (OUTPATIENT)
Dept: LAB | Facility: HOSPITAL | Age: 77
End: 2022-09-29

## 2022-09-29 DIAGNOSIS — N18.31 STAGE 3A CHRONIC KIDNEY DISEASE: ICD-10-CM

## 2022-09-29 DIAGNOSIS — H53.9 VISION CHANGES: ICD-10-CM

## 2022-09-29 DIAGNOSIS — T46.2X1A HYPOTHYROIDISM DUE TO AMIODARONE: Primary | ICD-10-CM

## 2022-09-29 DIAGNOSIS — E03.2 HYPOTHYROIDISM DUE TO AMIODARONE: ICD-10-CM

## 2022-09-29 DIAGNOSIS — T46.2X1A HYPOTHYROIDISM DUE TO AMIODARONE: ICD-10-CM

## 2022-09-29 DIAGNOSIS — H53.9 VISION CHANGES: Primary | ICD-10-CM

## 2022-09-29 DIAGNOSIS — E03.2 HYPOTHYROIDISM DUE TO AMIODARONE: Primary | ICD-10-CM

## 2022-09-29 DIAGNOSIS — R73.03 PRE-DIABETES: ICD-10-CM

## 2022-09-29 LAB
ALBUMIN SERPL-MCNC: 4.3 G/DL (ref 3.5–5.2)
ALBUMIN/GLOB SERPL: 2.3 G/DL
ALP SERPL-CCNC: 64 U/L (ref 39–117)
ALT SERPL W P-5'-P-CCNC: 19 U/L (ref 1–41)
ANION GAP SERPL CALCULATED.3IONS-SCNC: 8 MMOL/L (ref 5–15)
AST SERPL-CCNC: 23 U/L (ref 1–40)
BASOPHILS # BLD AUTO: 0.04 10*3/MM3 (ref 0–0.2)
BASOPHILS NFR BLD AUTO: 0.6 % (ref 0–1.5)
BILIRUB SERPL-MCNC: 0.6 MG/DL (ref 0–1.2)
BUN SERPL-MCNC: 20 MG/DL (ref 8–23)
BUN/CREAT SERPL: 13.2 (ref 7–25)
CALCIUM SPEC-SCNC: 9.4 MG/DL (ref 8.6–10.5)
CHLORIDE SERPL-SCNC: 105 MMOL/L (ref 98–107)
CO2 SERPL-SCNC: 26 MMOL/L (ref 22–29)
CREAT SERPL-MCNC: 1.52 MG/DL (ref 0.76–1.27)
CRP SERPL-MCNC: <0.3 MG/DL (ref 0–0.5)
DEPRECATED RDW RBC AUTO: 43.6 FL (ref 37–54)
EGFRCR SERPLBLD CKD-EPI 2021: 47.2 ML/MIN/1.73
EOSINOPHIL # BLD AUTO: 0.41 10*3/MM3 (ref 0–0.4)
EOSINOPHIL NFR BLD AUTO: 6 % (ref 0.3–6.2)
ERYTHROCYTE [DISTWIDTH] IN BLOOD BY AUTOMATED COUNT: 12 % (ref 12.3–15.4)
ERYTHROCYTE [SEDIMENTATION RATE] IN BLOOD: 6 MM/HR (ref 0–20)
GLOBULIN UR ELPH-MCNC: 1.9 GM/DL
GLUCOSE SERPL-MCNC: 105 MG/DL (ref 65–99)
HBA1C MFR BLD: 5.4 % (ref 4.8–5.6)
HCT VFR BLD AUTO: 42.7 % (ref 37.5–51)
HGB BLD-MCNC: 14.3 G/DL (ref 13–17.7)
IMM GRANULOCYTES # BLD AUTO: 0.03 10*3/MM3 (ref 0–0.05)
IMM GRANULOCYTES NFR BLD AUTO: 0.4 % (ref 0–0.5)
LYMPHOCYTES # BLD AUTO: 1.42 10*3/MM3 (ref 0.7–3.1)
LYMPHOCYTES NFR BLD AUTO: 20.7 % (ref 19.6–45.3)
MCH RBC QN AUTO: 32.7 PG (ref 26.6–33)
MCHC RBC AUTO-ENTMCNC: 33.5 G/DL (ref 31.5–35.7)
MCV RBC AUTO: 97.7 FL (ref 79–97)
MONOCYTES # BLD AUTO: 0.74 10*3/MM3 (ref 0.1–0.9)
MONOCYTES NFR BLD AUTO: 10.8 % (ref 5–12)
NEUTROPHILS NFR BLD AUTO: 4.23 10*3/MM3 (ref 1.7–7)
NEUTROPHILS NFR BLD AUTO: 61.5 % (ref 42.7–76)
NRBC BLD AUTO-RTO: 0 /100 WBC (ref 0–0.2)
PLATELET # BLD AUTO: 212 10*3/MM3 (ref 140–450)
PMV BLD AUTO: 10.6 FL (ref 6–12)
POTASSIUM SERPL-SCNC: 4.3 MMOL/L (ref 3.5–5.2)
PROT SERPL-MCNC: 6.2 G/DL (ref 6–8.5)
RBC # BLD AUTO: 4.37 10*6/MM3 (ref 4.14–5.8)
RPR SER QL: NORMAL
SODIUM SERPL-SCNC: 139 MMOL/L (ref 136–145)
TSH SERPL DL<=0.05 MIU/L-ACNC: 27.7 UIU/ML (ref 0.27–4.2)
WBC NRBC COR # BLD: 6.87 10*3/MM3 (ref 3.4–10.8)

## 2022-09-29 PROCEDURE — 86140 C-REACTIVE PROTEIN: CPT

## 2022-09-29 PROCEDURE — 83036 HEMOGLOBIN GLYCOSYLATED A1C: CPT

## 2022-09-29 PROCEDURE — 80053 COMPREHEN METABOLIC PANEL: CPT

## 2022-09-29 PROCEDURE — 86592 SYPHILIS TEST NON-TREP QUAL: CPT

## 2022-09-29 PROCEDURE — 36415 COLL VENOUS BLD VENIPUNCTURE: CPT

## 2022-09-29 PROCEDURE — 85025 COMPLETE CBC W/AUTO DIFF WBC: CPT

## 2022-09-29 PROCEDURE — 85652 RBC SED RATE AUTOMATED: CPT

## 2022-09-29 PROCEDURE — 84443 ASSAY THYROID STIM HORMONE: CPT

## 2022-09-29 RX ORDER — LEVOTHYROXINE SODIUM 0.12 MG/1
125 TABLET ORAL DAILY
Qty: 30 TABLET | Refills: 2 | Status: SHIPPED | OUTPATIENT
Start: 2022-09-29 | End: 2022-11-09

## 2022-09-29 NOTE — TELEPHONE ENCOUNTER
Dora Duffy OD called saying he has a swollen right optic nerve. She is concerned about giant cell arteritis. I reviewed his chart with her and given his past h/o PMR, she will call Dr. López to get labs today. She will call back if any issue getting those drawn.

## 2022-10-03 DIAGNOSIS — R73.03 PRE-DIABETES: Primary | ICD-10-CM

## 2022-10-06 DIAGNOSIS — R73.03 PRE-DIABETES: Primary | ICD-10-CM

## 2022-10-10 ENCOUNTER — PRE-ADMISSION TESTING (OUTPATIENT)
Dept: PREADMISSION TESTING | Facility: HOSPITAL | Age: 77
End: 2022-10-10

## 2022-10-10 VITALS
TEMPERATURE: 98 F | SYSTOLIC BLOOD PRESSURE: 144 MMHG | HEART RATE: 54 BPM | HEIGHT: 75 IN | DIASTOLIC BLOOD PRESSURE: 79 MMHG | OXYGEN SATURATION: 98 % | RESPIRATION RATE: 18 BRPM | BODY MASS INDEX: 30.34 KG/M2 | WEIGHT: 244 LBS

## 2022-10-10 RX ORDER — PREDNISONE 20 MG/1
3 TABLET ORAL DAILY
COMMUNITY
Start: 2022-09-29 | End: 2022-11-08

## 2022-10-11 ENCOUNTER — ANESTHESIA (OUTPATIENT)
Dept: PERIOP | Facility: HOSPITAL | Age: 77
End: 2022-10-11

## 2022-10-11 ENCOUNTER — HOSPITAL ENCOUNTER (OUTPATIENT)
Facility: HOSPITAL | Age: 77
Setting detail: HOSPITAL OUTPATIENT SURGERY
Discharge: HOME | End: 2022-10-11
Attending: OPHTHALMOLOGY | Admitting: OPHTHALMOLOGY

## 2022-10-11 ENCOUNTER — ANESTHESIA EVENT (OUTPATIENT)
Dept: PERIOP | Facility: HOSPITAL | Age: 77
End: 2022-10-11

## 2022-10-11 VITALS
DIASTOLIC BLOOD PRESSURE: 91 MMHG | RESPIRATION RATE: 16 BRPM | BODY MASS INDEX: 31.12 KG/M2 | SYSTOLIC BLOOD PRESSURE: 134 MMHG | HEIGHT: 74 IN | HEART RATE: 51 BPM | OXYGEN SATURATION: 100 % | WEIGHT: 242.51 LBS | TEMPERATURE: 98.2 F

## 2022-10-11 DIAGNOSIS — Z98.890 POSTOPERATIVE EYE STATE: Primary | ICD-10-CM

## 2022-10-11 DIAGNOSIS — M31.6 TEMPORAL ARTERITIS: ICD-10-CM

## 2022-10-11 PROCEDURE — 25010000002 FENTANYL CITRATE (PF) 100 MCG/2ML SOLUTION: Performed by: NURSE ANESTHETIST, CERTIFIED REGISTERED

## 2022-10-11 PROCEDURE — 25010000002 ONDANSETRON PER 1 MG: Performed by: NURSE ANESTHETIST, CERTIFIED REGISTERED

## 2022-10-11 PROCEDURE — 25010000002 PROPOFOL 10 MG/ML EMULSION: Performed by: NURSE ANESTHETIST, CERTIFIED REGISTERED

## 2022-10-11 PROCEDURE — 88305 TISSUE EXAM BY PATHOLOGIST: CPT | Performed by: OPHTHALMOLOGY

## 2022-10-11 RX ORDER — SODIUM CHLORIDE, SODIUM LACTATE, POTASSIUM CHLORIDE, CALCIUM CHLORIDE 600; 310; 30; 20 MG/100ML; MG/100ML; MG/100ML; MG/100ML
9 INJECTION, SOLUTION INTRAVENOUS CONTINUOUS
Status: DISCONTINUED | OUTPATIENT
Start: 2022-10-11 | End: 2022-10-11 | Stop reason: HOSPADM

## 2022-10-11 RX ORDER — MIDAZOLAM HYDROCHLORIDE 1 MG/ML
0.5 INJECTION INTRAMUSCULAR; INTRAVENOUS
Status: DISCONTINUED | OUTPATIENT
Start: 2022-10-11 | End: 2022-10-11 | Stop reason: HOSPADM

## 2022-10-11 RX ORDER — LABETALOL HYDROCHLORIDE 5 MG/ML
5 INJECTION, SOLUTION INTRAVENOUS
Status: DISCONTINUED | OUTPATIENT
Start: 2022-10-11 | End: 2022-10-11 | Stop reason: HOSPADM

## 2022-10-11 RX ORDER — HYDROCODONE BITARTRATE AND ACETAMINOPHEN 5; 325 MG/1; MG/1
1 TABLET ORAL EVERY 6 HOURS PRN
Qty: 15 TABLET | Refills: 0 | Status: SHIPPED | OUTPATIENT
Start: 2022-10-11 | End: 2022-11-08

## 2022-10-11 RX ORDER — LIDOCAINE HYDROCHLORIDE 20 MG/ML
INJECTION, SOLUTION EPIDURAL; INFILTRATION; INTRACAUDAL; PERINEURAL AS NEEDED
Status: DISCONTINUED | OUTPATIENT
Start: 2022-10-11 | End: 2022-10-11 | Stop reason: SURG

## 2022-10-11 RX ORDER — PROMETHAZINE HYDROCHLORIDE 25 MG/1
25 TABLET ORAL ONCE AS NEEDED
Status: DISCONTINUED | OUTPATIENT
Start: 2022-10-11 | End: 2022-10-11 | Stop reason: HOSPADM

## 2022-10-11 RX ORDER — EPHEDRINE SULFATE 50 MG/ML
5 INJECTION, SOLUTION INTRAVENOUS ONCE AS NEEDED
Status: DISCONTINUED | OUTPATIENT
Start: 2022-10-11 | End: 2022-10-11 | Stop reason: HOSPADM

## 2022-10-11 RX ORDER — FENTANYL CITRATE 50 UG/ML
50 INJECTION, SOLUTION INTRAMUSCULAR; INTRAVENOUS
Status: DISCONTINUED | OUTPATIENT
Start: 2022-10-11 | End: 2022-10-11 | Stop reason: HOSPADM

## 2022-10-11 RX ORDER — HYDROMORPHONE HYDROCHLORIDE 1 MG/ML
0.5 INJECTION, SOLUTION INTRAMUSCULAR; INTRAVENOUS; SUBCUTANEOUS
Status: DISCONTINUED | OUTPATIENT
Start: 2022-10-11 | End: 2022-10-11 | Stop reason: HOSPADM

## 2022-10-11 RX ORDER — DIPHENHYDRAMINE HYDROCHLORIDE 50 MG/ML
12.5 INJECTION INTRAMUSCULAR; INTRAVENOUS
Status: DISCONTINUED | OUTPATIENT
Start: 2022-10-11 | End: 2022-10-11 | Stop reason: HOSPADM

## 2022-10-11 RX ORDER — NALOXONE HCL 0.4 MG/ML
0.2 VIAL (ML) INJECTION AS NEEDED
Status: DISCONTINUED | OUTPATIENT
Start: 2022-10-11 | End: 2022-10-11 | Stop reason: HOSPADM

## 2022-10-11 RX ORDER — ERYTHROMYCIN 5 MG/G
OINTMENT OPHTHALMIC AS NEEDED
Status: DISCONTINUED | OUTPATIENT
Start: 2022-10-11 | End: 2022-10-11 | Stop reason: HOSPADM

## 2022-10-11 RX ORDER — PROMETHAZINE HYDROCHLORIDE 25 MG/1
25 SUPPOSITORY RECTAL ONCE AS NEEDED
Status: DISCONTINUED | OUTPATIENT
Start: 2022-10-11 | End: 2022-10-11 | Stop reason: HOSPADM

## 2022-10-11 RX ORDER — HYDROCODONE BITARTRATE AND ACETAMINOPHEN 7.5; 325 MG/1; MG/1
1 TABLET ORAL ONCE AS NEEDED
Status: DISCONTINUED | OUTPATIENT
Start: 2022-10-11 | End: 2022-10-11 | Stop reason: HOSPADM

## 2022-10-11 RX ORDER — FENTANYL CITRATE 50 UG/ML
INJECTION, SOLUTION INTRAMUSCULAR; INTRAVENOUS AS NEEDED
Status: DISCONTINUED | OUTPATIENT
Start: 2022-10-11 | End: 2022-10-11 | Stop reason: SURG

## 2022-10-11 RX ORDER — SODIUM CHLORIDE 0.9 % (FLUSH) 0.9 %
3 SYRINGE (ML) INJECTION EVERY 12 HOURS SCHEDULED
Status: DISCONTINUED | OUTPATIENT
Start: 2022-10-11 | End: 2022-10-11 | Stop reason: HOSPADM

## 2022-10-11 RX ORDER — FLUMAZENIL 0.1 MG/ML
0.2 INJECTION INTRAVENOUS AS NEEDED
Status: DISCONTINUED | OUTPATIENT
Start: 2022-10-11 | End: 2022-10-11 | Stop reason: HOSPADM

## 2022-10-11 RX ORDER — IBUPROFEN 600 MG/1
600 TABLET ORAL ONCE AS NEEDED
Status: DISCONTINUED | OUTPATIENT
Start: 2022-10-11 | End: 2022-10-11 | Stop reason: HOSPADM

## 2022-10-11 RX ORDER — PROPOFOL 10 MG/ML
VIAL (ML) INTRAVENOUS AS NEEDED
Status: DISCONTINUED | OUTPATIENT
Start: 2022-10-11 | End: 2022-10-11 | Stop reason: SURG

## 2022-10-11 RX ORDER — OXYCODONE AND ACETAMINOPHEN 7.5; 325 MG/1; MG/1
1 TABLET ORAL EVERY 4 HOURS PRN
Status: DISCONTINUED | OUTPATIENT
Start: 2022-10-11 | End: 2022-10-11 | Stop reason: HOSPADM

## 2022-10-11 RX ORDER — FAMOTIDINE 10 MG/ML
20 INJECTION, SOLUTION INTRAVENOUS ONCE
Status: COMPLETED | OUTPATIENT
Start: 2022-10-11 | End: 2022-10-11

## 2022-10-11 RX ORDER — ONDANSETRON 2 MG/ML
4 INJECTION INTRAMUSCULAR; INTRAVENOUS ONCE AS NEEDED
Status: DISCONTINUED | OUTPATIENT
Start: 2022-10-11 | End: 2022-10-11 | Stop reason: HOSPADM

## 2022-10-11 RX ORDER — MAGNESIUM HYDROXIDE 1200 MG/15ML
LIQUID ORAL AS NEEDED
Status: DISCONTINUED | OUTPATIENT
Start: 2022-10-11 | End: 2022-10-11 | Stop reason: HOSPADM

## 2022-10-11 RX ORDER — SODIUM CHLORIDE 0.9 % (FLUSH) 0.9 %
3-10 SYRINGE (ML) INJECTION AS NEEDED
Status: DISCONTINUED | OUTPATIENT
Start: 2022-10-11 | End: 2022-10-11 | Stop reason: HOSPADM

## 2022-10-11 RX ORDER — HYDRALAZINE HYDROCHLORIDE 20 MG/ML
5 INJECTION INTRAMUSCULAR; INTRAVENOUS
Status: DISCONTINUED | OUTPATIENT
Start: 2022-10-11 | End: 2022-10-11 | Stop reason: HOSPADM

## 2022-10-11 RX ORDER — ERYTHROMYCIN 5 MG/G
OINTMENT OPHTHALMIC 2 TIMES DAILY
Qty: 3.5 G | Refills: 0 | Status: SHIPPED | OUTPATIENT
Start: 2022-10-11 | End: 2022-11-08

## 2022-10-11 RX ORDER — ONDANSETRON 2 MG/ML
INJECTION INTRAMUSCULAR; INTRAVENOUS AS NEEDED
Status: DISCONTINUED | OUTPATIENT
Start: 2022-10-11 | End: 2022-10-11 | Stop reason: SURG

## 2022-10-11 RX ORDER — DIPHENHYDRAMINE HCL 25 MG
25 CAPSULE ORAL
Status: DISCONTINUED | OUTPATIENT
Start: 2022-10-11 | End: 2022-10-11 | Stop reason: HOSPADM

## 2022-10-11 RX ORDER — LIDOCAINE HYDROCHLORIDE 10 MG/ML
0.5 INJECTION, SOLUTION EPIDURAL; INFILTRATION; INTRACAUDAL; PERINEURAL ONCE AS NEEDED
Status: DISCONTINUED | OUTPATIENT
Start: 2022-10-11 | End: 2022-10-11 | Stop reason: HOSPADM

## 2022-10-11 RX ORDER — GLYCOPYRROLATE 0.2 MG/ML
INJECTION INTRAMUSCULAR; INTRAVENOUS AS NEEDED
Status: DISCONTINUED | OUTPATIENT
Start: 2022-10-11 | End: 2022-10-11 | Stop reason: SURG

## 2022-10-11 RX ADMIN — SODIUM CHLORIDE, POTASSIUM CHLORIDE, SODIUM LACTATE AND CALCIUM CHLORIDE 9 ML/HR: 600; 310; 30; 20 INJECTION, SOLUTION INTRAVENOUS at 13:30

## 2022-10-11 RX ADMIN — ONDANSETRON 4 MG: 2 INJECTION INTRAMUSCULAR; INTRAVENOUS at 14:56

## 2022-10-11 RX ADMIN — FENTANYL CITRATE 50 MCG: 50 INJECTION, SOLUTION INTRAMUSCULAR; INTRAVENOUS at 14:47

## 2022-10-11 RX ADMIN — FENTANYL CITRATE 50 MCG: 50 INJECTION, SOLUTION INTRAMUSCULAR; INTRAVENOUS at 15:51

## 2022-10-11 RX ADMIN — FAMOTIDINE 20 MG: 10 INJECTION INTRAVENOUS at 13:30

## 2022-10-11 RX ADMIN — PROPOFOL 100 MG: 10 INJECTION, EMULSION INTRAVENOUS at 14:50

## 2022-10-11 RX ADMIN — PROPOFOL 100 MCG/KG/MIN: 10 INJECTION, EMULSION INTRAVENOUS at 14:51

## 2022-10-11 RX ADMIN — GLYCOPYRROLATE 0.2 MCG: 1 INJECTION INTRAMUSCULAR; INTRAVENOUS at 14:50

## 2022-10-11 RX ADMIN — Medication 100 MG: at 14:50

## 2022-10-11 NOTE — OP NOTE
OPERATIVE NOTE    Patient Identification:  Name: Gary Grullon  Age: 77 y.o.  Sex: male  :  1945  MRN: [id number]                                               Preoperative diagnosis:  Bilateral headache and vision loss  Postoperative diagnosis: same  Procedure: Bilateral temporal artery biospy  Surgeon: José Antonio Ramirez MD who was present and scrubbed throughout all critical portions of the operation  Assistants: Trinidad Roy MD  Anesthesia: MAC  EBL: less than 50cc  Specimens:  * No orders in the log *  Description of the procedure:     The patient was taken to the operating room and placed on the table in the supine position, where anesthesia was induced. 2% lidocaine with epinephrine and 0.5% marcaine in a 1:1 fashion was injected over the surgical site after confirmation of the artery with palpation and doppler, and the patient was prepped and draped in the usual manner for orbitofacial surgery.     Corneal protectors were placed in both eyes.    A 15 Bard Jermaine blade incision was made over the right superficial temporal artery. Sharp dissection was carried down to the temporal artery, and the temporal artery was undermined superiorly and inferiorly. The artery was ligated with 4-0 silk sutures superiorly and inferiorly. A strip of artery, approximately 2 cm in length,  was excised with sharp dissection. The incision was closed with 5-0 Vicryl sutures deeply and 5-0 fast absorbing sutures superficially.    The exact same procedure wast then performed over the contralateral superficial temporal artery.     The corneal protectors were removed and antibiotic ophthalmic ointment was placed over the surgical site.     The patient was then awakened and taken from the operating room in good condition, having tolerated the procedure well. There were no complications, and the estimated blood loss was less than 50 cc.

## 2022-10-11 NOTE — ANESTHESIA PREPROCEDURE EVALUATION
Anesthesia Evaluation     Patient summary reviewed and Nursing notes reviewed   NPO Solid Status: > 8 hours  NPO Liquid Status: > 2 hours           Airway   Mallampati: III  TM distance: >3 FB  Neck ROM: full  No difficulty expected and Large neck circumference  Dental - normal exam     Pulmonary - normal exam   (+) sleep apnea on CPAP,   Cardiovascular - normal exam  Exercise tolerance: good (4-7 METS)    ECG reviewed    (+) hypertension, dysrhythmias Atrial Fib, hyperlipidemia,       Neuro/Psych- negative ROS  GI/Hepatic/Renal/Endo    (+) obesity,   renal disease CRI, thyroid problem hypothyroidism    Musculoskeletal (-) negative ROS    Abdominal   (+) obese,     Bowel sounds: normal.   Substance History - negative use     OB/GYN negative ob/gyn ROS         Other        ROS/Med Hx Other: H/O tachycardia induced  Cardiomyopathy. Preserved LVEF.    Eliquis stopped on 10/9                Anesthesia Plan    ASA 3     MAC     intravenous induction     Anesthetic plan, risks, benefits, and alternatives have been provided, discussed and informed consent has been obtained with: patient.  Pre-procedure education provided  Plan discussed with CRNA.        CODE STATUS:

## 2022-10-11 NOTE — ANESTHESIA POSTPROCEDURE EVALUATION
"Patient: Gary Grullon    Procedure Summary     Date: 10/11/22 Room / Location: [facility] OR  / [facility] MAIN OR    Anesthesia Start: 1445 Anesthesia Stop: 1619    Procedure: BILATERAL TEMPORAL ARTERY BIOPSY (Bilateral: Head) Diagnosis:     Surgeons: José Antonio Ramirez MD Provider: Wolf Cerna MD    Anesthesia Type: MAC ASA Status: 3          Anesthesia Type: MAC    Vitals  Vitals Value Taken Time   /88 10/11/22 1617   Temp 36.8 °C (98.2 °F) 10/11/22 1617   Pulse 57 10/11/22 1626   Resp 16 10/11/22 1617   SpO2 97 % 10/11/22 1626   Vitals shown include unvalidated device data.        Post Anesthesia Care and Evaluation    Pain management: adequate    Airway patency: patent  Anesthetic complications: No anesthetic complications    Cardiovascular status: acceptable  Respiratory status: acceptable  Hydration status: acceptable    Comments: /88 (BP Location: Right arm, Patient Position: Sitting)   Pulse 58   Temp 36.8 °C (98.2 °F) (Oral)   Resp 16   Ht 188 cm (74\")   Wt 110 kg (242 lb 8.1 oz)   SpO2 100%   BMI 31.14 kg/m²         "

## 2022-10-13 LAB
LAB AP CASE REPORT: NORMAL
PATH REPORT.FINAL DX SPEC: NORMAL
PATH REPORT.GROSS SPEC: NORMAL

## 2022-10-18 NOTE — H&P
History & Physical       Patient: Gary Grullon    Date of Admission: 10/11/2022 12:25 PM    YOB: 1945    Medical Record Number: [id number]      Chief Complaints: Sudden vision loss      History of Present Illness: 77 y.o. male presents with sudden vision loss and optic nerve swelling in his right eye.  He has a history of polymyalgia rheumatica and rheumatology was concerned for giant cell arteritis.  No new meds/health problems since office visit      Allergies: No Known Allergies    10 point review of systems negative, except pertaining to the HPI    Medications:   Home Medications:  No current facility-administered medications on file prior to encounter.     Current Outpatient Medications on File Prior to Encounter   Medication Sig   • amiodarone (PACERONE) 200 MG tablet Take 1 tablet by mouth Daily.   • atorvastatin (LIPITOR) 20 MG tablet TAKE 1 TABLET DAILY (Patient taking differently: Take 1 tablet by mouth Daily.)   • levothyroxine (Synthroid) 125 MCG tablet Take 1 tablet by mouth Daily.   • metoprolol succinate XL (TOPROL-XL) 25 MG 24 hr tablet Take 0.5 tablets by mouth Daily.   • sacubitril-valsartan (Entresto) 24-26 MG tablet Take 1 tablet by mouth 2 (Two) Times a Day.   • cholecalciferol (VITAMIN D3) 1000 units tablet Take 1 tablet by mouth Daily. HOLDING FOR DOS   • Eliquis 5 MG tablet tablet TAKE 1 TABLET EVERY 12 HOURS (Patient taking differently: Take 1 tablet by mouth Every 12 (Twelve) Hours. HOLDING FOR DOS    LAST DOSE 10-9-22)   • Omega 3 1200 MG capsule Take 1 tablet by mouth Daily.   • sacubitril-valsartan (ENTRESTO) 24-26 MG tablet Take 1 tablet by mouth Daily.   • vitamin C (ASCORBIC ACID) 500 MG tablet Take 2 tablets by mouth Daily. HOLDING FOR DOS     Current Medications:  Scheduled Meds:  Continuous Infusions:No current facility-administered medications for this encounter.    PRN Meds:.    Past Medical History:   Diagnosis Date   • Atrial fibrillation (HCC)    • CKD  (chronic kidney disease) stage 3, GFR 30-59 ml/min (HCC)    • COVID-19 01/2022   • Disease of thyroid gland    • Elevated cholesterol    • Non-ischemic cardiomyopathy (HCC)    • HEDY (obstructive sleep apnea)     AHI 15/h supine 49/h   • Vision loss         Past Surgical History:   Procedure Laterality Date   • CATARACT EXTRACTION     • COLONOSCOPY  04/04/2018    DR [place] Gu[place]   • CYST REMOVAL  1970   • PILONIDAL CYST DRAINAGE     • TEMPORAL ARTERY BIOPSY Bilateral 10/11/2022    Procedure: BILATERAL TEMPORAL ARTERY BIOPSY;  Surgeon: José Antonio Ramirez MD;  Location: [facility];  Service: Ophthalmology;  Laterality: Bilateral;        Social History     Occupational History   • Not on file   Tobacco Use   • Smoking status: Never   • Smokeless tobacco: Never   Vaping Use   • Vaping Use: Never used   Substance and Sexual Activity   • Alcohol use: Yes     Alcohol/week: 14.0 standard drinks     Types: 14 Glasses of wine per week     Comment: 2 glasses of wine with dinner   • Drug use: No   • Sexual activity: Defer      Social History     Social History Narrative   • Not on file        Family History   Problem Relation Age of Onset   • COPD Mother    • Hypertension Father    • Diabetes Father    • Stroke Father    • Coronary artery disease Father    • Kidney disease Sister    • Diabetes Brother    • No Known Problems Maternal Aunt    • No Known Problems Maternal Uncle    • No Known Problems Paternal Aunt    • No Known Problems Paternal Uncle    • No Known Problems Maternal Grandmother    • No Known Problems Maternal Grandfather    • No Known Problems Paternal Grandmother    • No Known Problems Paternal Grandfather    • Anesthesia problems Neg Hx    • Broken bones Neg Hx    • Cancer Neg Hx    • Clotting disorder Neg Hx    • Collagen disease Neg Hx    • Dislocations Neg Hx    • Osteoporosis Neg Hx    • Rheumatologic disease Neg Hx    • Scoliosis Neg Hx    • Severe sprains Neg Hx    • Malig  Hyperthermia Neg Hx            Physical Exam   Constitutional: Alert, cooperative, in no acute distress    Head: Normocephalic.   Eyes:    Normal ocular adnexa  Neck: Normal range of motion.   Cardiovascular: Normal rate.    Pulmonary/Chest: Effort normal.   Neurological: Alert.   Skin: Skin is warm.   Psychiatric: Normal mood and affect.       Assessment/Plan:  The patient voiced understanding of the risks, benefits, and alternative forms of treatment that were discussed and the patient consents to proceed with bilateral temporal artery biopsy.    José Antonio Ramirez MD

## 2022-11-02 RX ORDER — METOPROLOL SUCCINATE 25 MG/1
12.5 TABLET, EXTENDED RELEASE ORAL DAILY
Qty: 45 TABLET | Refills: 3 | Status: SHIPPED | OUTPATIENT
Start: 2022-11-02

## 2022-11-04 RX ORDER — SACUBITRIL AND VALSARTAN 24; 26 MG/1; MG/1
1 TABLET, FILM COATED ORAL 2 TIMES DAILY
Qty: 180 TABLET | Refills: 3 | Status: SHIPPED | OUTPATIENT
Start: 2022-11-04 | End: 2022-11-08

## 2022-11-08 ENCOUNTER — OFFICE VISIT (OUTPATIENT)
Dept: INTERNAL MEDICINE | Facility: CLINIC | Age: 77
End: 2022-11-08

## 2022-11-08 VITALS
WEIGHT: 241 LBS | BODY MASS INDEX: 30.93 KG/M2 | SYSTOLIC BLOOD PRESSURE: 140 MMHG | HEIGHT: 74 IN | DIASTOLIC BLOOD PRESSURE: 82 MMHG | TEMPERATURE: 97.5 F

## 2022-11-08 DIAGNOSIS — I10 ESSENTIAL HYPERTENSION: Primary | Chronic | ICD-10-CM

## 2022-11-08 DIAGNOSIS — I48.0 PAROXYSMAL ATRIAL FIBRILLATION: Chronic | ICD-10-CM

## 2022-11-08 DIAGNOSIS — R73.03 PRE-DIABETES: ICD-10-CM

## 2022-11-08 DIAGNOSIS — E03.2 HYPOTHYROIDISM DUE TO MEDICATION: Chronic | ICD-10-CM

## 2022-11-08 PROCEDURE — 99214 OFFICE O/P EST MOD 30 MIN: CPT | Performed by: INTERNAL MEDICINE
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
## 2022-11-09 DIAGNOSIS — E03.2 HYPOTHYROIDISM DUE TO MEDICATION: Primary | ICD-10-CM

## 2022-11-09 RX ORDER — LEVOTHYROXINE SODIUM 0.15 MG/1
150 TABLET ORAL DAILY
Qty: 90 TABLET | Refills: 1 | Status: SHIPPED | OUTPATIENT
Start: 2022-11-09 | End: 2022-11-10 | Stop reason: SDUPTHER

## 2022-11-10 DIAGNOSIS — E03.2 HYPOTHYROIDISM DUE TO MEDICATION: ICD-10-CM

## 2022-11-10 RX ORDER — LEVOTHYROXINE SODIUM 0.15 MG/1
150 TABLET ORAL DAILY
Qty: 30 TABLET | Refills: 0 | Status: SHIPPED | OUTPATIENT
Start: 2022-11-10 | End: 2022-12-22 | Stop reason: SDUPTHER

## 2022-12-22 DIAGNOSIS — E03.2 HYPOTHYROIDISM DUE TO MEDICATION: ICD-10-CM

## 2022-12-22 RX ORDER — LEVOTHYROXINE SODIUM 0.15 MG/1
150 TABLET ORAL DAILY
Qty: 90 TABLET | Refills: 1 | Status: SHIPPED | OUTPATIENT
Start: 2022-12-22 | End: 2023-01-03 | Stop reason: ALTCHOICE

## 2022-12-22 NOTE — TELEPHONE ENCOUNTER
Caller: Mitchel Gary SAGE    Relationship: Self    Best call back number: [phone]    Requested Prescriptions:   Requested Prescriptions     Pending Prescriptions Disp Refills   • levothyroxine (Synthroid) 150 MCG tablet 30 tablet 0     Sig: Take 1 tablet by mouth Daily.        Pharmacy where request should be sent: [id number] - [place], [place] - [address] AT NEAR [facility] & [facility] [phone] [facility] [phone] FX     Additional details provided by patient: PATIENT IS COMPLETELY OUT, REQUESTING 90 DAY SUPPLY    Does the patient have less than a 3 day supply:  [x] Yes  [] No    Would you like a call back once the refill request has been completed: [x] Yes [] No    If the office needs to give you a call back, can they leave a voicemail: [x] Yes [] No    Aditya Anton Rep   12/22/22 10:15 EST

## 2023-01-03 ENCOUNTER — LAB (OUTPATIENT)
Dept: LAB | Facility: HOSPITAL | Age: 78
End: 2023-01-03
Payer: MEDICARE

## 2023-01-03 ENCOUNTER — TELEPHONE (OUTPATIENT)
Dept: CARDIOLOGY | Facility: CLINIC | Age: 78
End: 2023-01-03

## 2023-01-03 ENCOUNTER — OFFICE VISIT (OUTPATIENT)
Dept: CARDIOLOGY | Facility: CLINIC | Age: 78
End: 2023-01-03
Payer: MEDICARE

## 2023-01-03 VITALS
HEART RATE: 53 BPM | BODY MASS INDEX: 30.96 KG/M2 | WEIGHT: 249 LBS | DIASTOLIC BLOOD PRESSURE: 76 MMHG | HEIGHT: 75 IN | SYSTOLIC BLOOD PRESSURE: 110 MMHG

## 2023-01-03 DIAGNOSIS — I10 ESSENTIAL HYPERTENSION: Chronic | ICD-10-CM

## 2023-01-03 DIAGNOSIS — R73.03 PRE-DIABETES: ICD-10-CM

## 2023-01-03 DIAGNOSIS — I43 TACHYCARDIA INDUCED CARDIOMYOPATHY: ICD-10-CM

## 2023-01-03 DIAGNOSIS — Z98.890 HISTORY OF CARDIOVERSION: ICD-10-CM

## 2023-01-03 DIAGNOSIS — E78.00 HYPERCHOLESTEREMIA: Chronic | ICD-10-CM

## 2023-01-03 DIAGNOSIS — R00.0 TACHYCARDIA INDUCED CARDIOMYOPATHY: ICD-10-CM

## 2023-01-03 DIAGNOSIS — I48.19 ATRIAL FIBRILLATION, PERSISTENT: Primary | ICD-10-CM

## 2023-01-03 DIAGNOSIS — I48.19 ATRIAL FIBRILLATION, PERSISTENT: ICD-10-CM

## 2023-01-03 DIAGNOSIS — E03.2 HYPOTHYROIDISM DUE TO MEDICATION: Chronic | ICD-10-CM

## 2023-01-03 LAB
ALBUMIN SERPL-MCNC: 4.1 G/DL (ref 3.5–5.2)
ALBUMIN/GLOB SERPL: 1.8 G/DL
ALP SERPL-CCNC: 68 U/L (ref 39–117)
ALT SERPL W P-5'-P-CCNC: 24 U/L (ref 1–41)
ANION GAP SERPL CALCULATED.3IONS-SCNC: 10.7 MMOL/L (ref 5–15)
AST SERPL-CCNC: 28 U/L (ref 1–40)
BILIRUB SERPL-MCNC: 0.9 MG/DL (ref 0–1.2)
BUN SERPL-MCNC: 17 MG/DL (ref 8–23)
BUN/CREAT SERPL: 11.8 (ref 7–25)
CALCIUM SPEC-SCNC: 9.2 MG/DL (ref 8.6–10.5)
CHLORIDE SERPL-SCNC: 107 MMOL/L (ref 98–107)
CHOLEST SERPL-MCNC: 160 MG/DL (ref 0–200)
CO2 SERPL-SCNC: 23.3 MMOL/L (ref 22–29)
CREAT SERPL-MCNC: 1.44 MG/DL (ref 0.76–1.27)
EGFRCR SERPLBLD CKD-EPI 2021: 50.4 ML/MIN/1.73
GLOBULIN UR ELPH-MCNC: 2.3 GM/DL
GLUCOSE SERPL-MCNC: 103 MG/DL (ref 65–99)
HBA1C MFR BLD: 5.5 % (ref 4.8–5.6)
HDLC SERPL-MCNC: 67 MG/DL (ref 40–60)
LDLC SERPL CALC-MCNC: 74 MG/DL (ref 0–100)
LDLC/HDLC SERPL: 1.07 {RATIO}
POTASSIUM SERPL-SCNC: 4 MMOL/L (ref 3.5–5.2)
PROT SERPL-MCNC: 6.4 G/DL (ref 6–8.5)
SODIUM SERPL-SCNC: 141 MMOL/L (ref 136–145)
TRIGL SERPL-MCNC: 106 MG/DL (ref 0–150)
TSH SERPL DL<=0.05 MIU/L-ACNC: 2.02 UIU/ML (ref 0.27–4.2)
VLDLC SERPL-MCNC: 19 MG/DL (ref 5–40)

## 2023-01-03 PROCEDURE — 80053 COMPREHEN METABOLIC PANEL: CPT

## 2023-01-03 PROCEDURE — 93000 ELECTROCARDIOGRAM COMPLETE: CPT | Performed by: INTERNAL MEDICINE

## 2023-01-03 PROCEDURE — 84443 ASSAY THYROID STIM HORMONE: CPT

## 2023-01-03 PROCEDURE — 99214 OFFICE O/P EST MOD 30 MIN: CPT | Performed by: INTERNAL MEDICINE

## 2023-01-03 PROCEDURE — 80061 LIPID PANEL: CPT

## 2023-01-03 PROCEDURE — 1160F RVW MEDS BY RX/DR IN RCRD: CPT | Performed by: INTERNAL MEDICINE

## 2023-01-03 PROCEDURE — 83036 HEMOGLOBIN GLYCOSYLATED A1C: CPT

## 2023-01-03 PROCEDURE — 1159F MED LIST DOCD IN RCRD: CPT | Performed by: INTERNAL MEDICINE

## 2023-01-03 PROCEDURE — 36415 COLL VENOUS BLD VENIPUNCTURE: CPT

## 2023-01-03 RX ORDER — LEVOTHYROXINE SODIUM 0.12 MG/1
125 TABLET ORAL DAILY
COMMUNITY
End: 2023-02-01 | Stop reason: SDUPTHER

## 2023-01-03 NOTE — TELEPHONE ENCOUNTER
Dr. Soliz asked me to call Dr. Duffy at [phone] with Nolan Marte and have her call him back.  I called and put a message for her to call back.  I waited several hours and left a message for Brie the .  Brie called back and said Dr. Duffy is out today and she will have her call Dr. Soliz on his cell Wednesday morning.  Kate

## 2023-01-03 NOTE — PROGRESS NOTES
PATIENTINFORMATION    Date of Office Visit: 2023  Encounter Provider: Yash Soliz MD  Place of Service: [facility] CARDIOLOGY  Patient Name: Gary Grullon  : 1945    Subjective:     Encounter Date:2023      Patient ID: Gary Grullon is a 77 y.o. male.    Chief Complaint   Patient presents with   • Atrial Fibrillation     HPI  Mr. Grullon is a pleasant 77-year-old gentleman who came to cardiac clinic for follow-up visit.  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  He has impaired vision on right that seems to have improved with observation and now he was told he has issues on the left as well.  Initially was treated with high-dose steroid pending rule out giant cell arteritis as he has prior history of polymyalgia rheumatica.  Steroid discontinued after GCA ruled out.  Vision has remained overall stable and he continues to be active and exercise regularly.  He denies any shortness of breath, chest pain, orthopnea, PND, palpitations, presyncope or syncope or significant lower extremity swelling.  No ER visit or hospitalization since last clinic visit otherwise.    Compliant with all current medications .  No bleeding from any site on Eliquis.      ROS  All systems reviewed and negative except as noted in HPI.    Past Medical History:   Diagnosis Date   • COVID-19 2022   • NAION (non-arteritic anterior ischemic optic neuropathy), 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   • HEDY (obstructive sleep apnea)     AHI 15/h supine 49/h       Past Surgical History:   Procedure Laterality Date   • CATARACT EXTRACTION     • COLONOSCOPY  2018    DR Gary Paredes Guerrero [facility]   • CYST REMOVAL     • PILONIDAL CYST DRAINAGE     • TEMPORAL ARTERY BIOPSY Bilateral 10/11/2022    Procedure: BILATERAL TEMPORAL ARTERY BIOPSY;  Surgeon: José Antonio Ramirez MD;  Location: [facility] OR;  Service:  Ophthalmology;  Laterality: Bilateral;       Social History     Socioeconomic History   • Marital status:    Tobacco Use   • Smoking status: Never   • Smokeless tobacco: Never   Vaping Use   • Vaping Use: Never used   Substance and Sexual Activity   • Alcohol use: Yes     Alcohol/week: 14.0 standard drinks     Types: 14 Glasses of wine per week     Comment: 2 glasses of wine with dinner/  Daily caffeine use   • Drug use: No   • Sexual activity: Defer       Family History   Problem Relation Age of Onset   • COPD Mother    • Hypertension Father    • Diabetes Father    • Stroke Father    • Coronary artery disease Father    • Kidney disease Sister    • Diabetes Brother    • No Known Problems Maternal Aunt    • No Known Problems Maternal Uncle    • No Known Problems Paternal Aunt    • No Known Problems Paternal Uncle    • No Known Problems Maternal Grandmother    • No Known Problems Maternal Grandfather    • No Known Problems Paternal Grandmother    • No Known Problems Paternal Grandfather    • Anesthesia problems Neg Hx    • Broken bones Neg Hx    • Cancer Neg Hx    • Clotting disorder Neg Hx    • Collagen disease Neg Hx    • Dislocations Neg Hx    • Osteoporosis Neg Hx    • Rheumatologic disease Neg Hx    • Scoliosis Neg Hx    • Severe sprains Neg Hx    • Malig Hyperthermia Neg Hx            ECG 12 Lead    Date/Time: 1/3/2023 8:56 AM  Performed by: Yash Soliz MD  Authorized by: Yash Soliz MD   Comparison: compared with previous ECG from 5/17/2022  Similar to previous ECG  Rhythm: sinus rhythm  Rate: normal  Conduction: left anterior fascicular block  ST Segments: ST segments normal  T Waves: T waves normal  QRS axis: normal  Other: no other findings    Clinical impression: abnormal EKG               Objective:     /76   Pulse 53   Ht 189.2 cm (74.5\")   Wt 113 kg (249 lb)   BMI 31.54 kg/m²  Body mass index is 31.54 kg/m².     Constitutional:       General: Not in acute distress.      Appearance: Well-developed. Not diaphoretic.   Eyes:      Pupils: Pupils are equal, round, and reactive to light.   HENT:      Head: Normocephalic and atraumatic.   Neck:      Thyroid: No thyromegaly.   Pulmonary:      Effort: Pulmonary effort is normal. No respiratory distress.      Breath sounds: Normal breath sounds. No wheezing. No rales.   Chest:      Chest wall: Not tender to palpatation.   Cardiovascular:      Normal rate. Regular rhythm.      No gallop.   Pulses:     Intact distal pulses.   Edema:     Peripheral edema absent.   Abdominal:      General: Bowel sounds are normal. There is no distension.      Palpations: Abdomen is soft.      Tenderness: There is no guarding.   Musculoskeletal: Normal range of motion.         General: No deformity.      Cervical back: Normal range of motion and neck supple. Skin:     General: Skin is warm and dry.      Findings: No rash.   Neurological:      Mental Status: Alert and oriented to person, place, and time.      Cranial Nerves: No cranial nerve deficit.      Deep Tendon Reflexes: Reflexes are normal and symmetric.   Psychiatric:         Judgment: Judgment normal.         Review Of Data: I have reviewed pertinent recent labs, images and documents and pertinent findings included in HPI or assessment below.    Lipid Panel    Lipid Panel 7/22/22   Total Cholesterol 170   Triglycerides 80   HDL Cholesterol 70   VLDL Cholesterol 15   LDL Cholesterol  85               Assessment/Plan:         1.  Probably nonischemic cardiomyopathy from tach induced cardiomyopathy/persistent atrial fibrillation  Left ventricular ejection fraction back to normal on metoprolol and Entresto.  No evidence for heart failure on exam today  2.  Persistent atrial fibrillation status post successful cardioversion on 2/16/2022 for failed cardioversion on 2/1/2022  He has been on metoprolol, amiodarone and Eliquis  3.  Chronic kidney disease stage III -check BMP  4.  Obstructive sleep apnea on CPAP-  compliant with CPAP   He still does not like wearing the machine and reports sleeping better off CPAP.  5.  Hypercholesterolemia on a statin-check lipid panel  6.  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  Optic neuropathy(nonarteritic anterior ischemic optic neuropathy) -worried about amiodarone side effect  - discontinue amiodarone  -I will talk to his ophthalmologist.    I have discussed patient with his primary care provider Dr. Wilkerson     Return to clinic in 6 months or sooner with any concerning symptoms.       Diagnosis and plan of care discussed with patient and verbalized understanding.            Your medication list          Accurate as of January 3, 2023  9:27 AM. If you have any questions, ask your nurse or doctor.            CHANGE how you take these medications      Instructions Last Dose Given Next Dose Due   Eliquis 5 MG tablet tablet  Generic drug: apixaban  What changed:   · how much to take  · when to take this  · additional instructions      TAKE 1 TABLET EVERY 12 HOURS          CONTINUE taking these medications      Instructions Last Dose Given Next Dose Due   amiodarone 200 MG tablet  Commonly known as: PACERONE      Take 1 tablet by mouth Daily.       atorvastatin 20 MG tablet  Commonly known as: LIPITOR      TAKE 1 TABLET DAILY       cholecalciferol 25 MCG (1000 UT) tablet  Commonly known as: VITAMIN D3      Take 1 tablet by mouth Daily. HOLDING FOR DOS       levothyroxine 125 MCG tablet  Commonly known as: SYNTHROID, LEVOTHROID      Take 125 mcg by mouth Daily.       metoprolol succinate XL 25 MG 24 hr tablet  Commonly known as: TOPROL-XL      Take 0.5 tablets by mouth Daily.       Omega 3 1200 MG capsule      Take 1 tablet by mouth Daily.       sacubitril-valsartan 24-26 MG tablet  Commonly known as: ENTRESTO      Take 1 tablet by mouth Daily.       vitamin C 500 MG tablet  Commonly known as: ASCORBIC ACID      Take 2 tablets by mouth Daily.  HOLDING FOR WILLIAM Soliz MD  01/03/23  09:27 EST

## 2023-01-04 ENCOUNTER — TELEPHONE (OUTPATIENT)
Dept: CARDIOLOGY | Facility: CLINIC | Age: 78
End: 2023-01-04
Payer: MEDICARE

## 2023-01-04 NOTE — TELEPHONE ENCOUNTER
Gary Grullon returned call.  Reviewed results and recommendations with patient and he verbalized understanding.  Patient stated he will stop taking amiodarone.    Patient is asking what the plan is going forward?  Are we going to substitute another medication in place of the amiodarone or should he just monitor for now?    Please let me know how you would like to proceed.    Thank you,  Dora Burks, RN  Triage Nurse [facility] [place]

## 2023-01-04 NOTE — TELEPHONE ENCOUNTER
Called and left VM. Will continue to try to reach patient.     Saige Byrne RN  Triage [facility] [place], [place]

## 2023-01-04 NOTE — TELEPHONE ENCOUNTER
----- Message from Yash Soliz MD sent at 1/4/2023  1:28 PM EST -----  Please notify Mr. Grullon that blood work from yesterday shows slightly improved kidney function, normal liver function and good cholesterol reading.  I have also talked to his eye doctor who was  nice and kind lady.  She concurs that amiodarone is likely cause of the eye problem and I agree with stopping amiodarone.   His primary doctor will check his thyroid function in 1 month.  Let me know if he has any questions. Thank you!

## 2023-01-04 NOTE — PROGRESS NOTES
Please notify Mr. Grullon that blood work from yesterday shows slightly improved kidney function, normal liver function and good cholesterol reading.  I have also talked to his eye doctor who was  nice and kind lady.  She concurs that amiodarone is likely cause of the eye problem and I agree with stopping amiodarone.   His primary doctor will check his thyroid function in 1 month.  Let me know if he has any questions. Thank you!

## 2023-01-05 ENCOUNTER — OFFICE VISIT (OUTPATIENT)
Dept: SLEEP MEDICINE | Facility: HOSPITAL | Age: 78
End: 2023-01-05
Payer: MEDICARE

## 2023-01-05 VITALS
WEIGHT: 247 LBS | OXYGEN SATURATION: 97 % | SYSTOLIC BLOOD PRESSURE: 150 MMHG | DIASTOLIC BLOOD PRESSURE: 84 MMHG | HEART RATE: 52 BPM | BODY MASS INDEX: 30.71 KG/M2 | HEIGHT: 75 IN

## 2023-01-05 DIAGNOSIS — E66.09 CLASS 1 OBESITY DUE TO EXCESS CALORIES WITHOUT SERIOUS COMORBIDITY WITH BODY MASS INDEX (BMI) OF 31.0 TO 31.9 IN ADULT: ICD-10-CM

## 2023-01-05 DIAGNOSIS — G47.33 OSA ON CPAP: Primary | ICD-10-CM

## 2023-01-05 DIAGNOSIS — Z99.89 OSA ON CPAP: Primary | ICD-10-CM

## 2023-01-05 PROCEDURE — G0463 HOSPITAL OUTPT CLINIC VISIT: HCPCS

## 2023-01-05 PROCEDURE — 99213 OFFICE O/P EST LOW 20 MIN: CPT | Performed by: INTERNAL MEDICINE

## 2023-01-05 NOTE — TELEPHONE ENCOUNTER
Reviewed recommendations with Gary Grullon and the patient verbalized understanding of the recommendations.    Patient stated he was asymptomatic with previous episodes of atrial fibrillation.  Patient does have Apple Watch that he wears.  Requested patient notify office with any irregular heart beat notifications.  Patient stated he will do this.    Thank you,  Dora Burks RN  Triage Nurse [facility] [place]

## 2023-01-05 NOTE — PROGRESS NOTES
[facility]  [address]  [place] [address]  Phone   Fax       SLEEP CLINIC FOLLOW UP PROGRESS NOTE.    Gary Grullon  [id number]   1945  77 y.o.  male      PCP: Rommel Wlikerson MD      Date of visit: 1/5/2023    Chief Complaint   Patient presents with   • Sleep Apnea   • Obesity       HPI:  This is a 77 y.o. years old patient is here for the management of obstructive sleep apnea.  Sleep apnea is moderate in severity with a AHI of 15/hr. Patient is using positive airway pressure therapy with auto CPAP and the symptoms of snoring, non-restorative sleep and daytime excessive sleepiness have improved significantly on the therapy. Normally goes to bed at variable and wakes up at variable.  The patient wakes up 2 time(s) during the night and has no problem going back to sleep.  Feels refreshed after waking up.  Patient also denies headaches and nasal congestion.     Patient is using CPAP but he says that he does not like to use it and he hates the machine.  He wants to know how to improve his sleep apnea.  I have reviewed his sleep study which shows that he has more dependent supine sleep apnea.  I have instructed the patient to lose about 25 to 30 pounds then I can repeat another home sleep test    Medications and allergies are reviewed by me and documented in the encounter.     SOCIAL (habits pertaining to sleep medicine)  History tobacco use:No   History of alcohol use: 0 per week  Caffeine use: 2     REVIEW OF SYSTEMS:   [place] Sleepiness Scale :Total score: 5   Nasal congestion:No   Dry mouth/nose:No   Post nasal drip; No   Acid reflux/Heartburn:No   Abd bloating:No   Morning headache:No   Anxiety:No   Depression:No    PHYSICAL EXAMINATION:  CONSTITUTIONAL:  Vitals:    01/05/23 0700   BP: 150/84   Pulse: 52   SpO2: 97%   Weight: 112 kg (247 lb)   Height: 189.3 cm (74.52\")    Body mass index is 31.27 kg/m².   NOSE: nasal passages are clear, No  deformities noted   RESP SYSTEM: Not in any respiratory distress, no chest deformities noted,   CARDIOVASULAR: No edema noted  NEURO: Oriented x 3, gait normal,  Mood and affect appeared appropriate      Data reviewed:  The Smart card downloaded on 1/5/2023 has been reviewed independently by me for compliance and discussed the data with the patient.   Compliance; 100%  More than 4 hr use, 97%  Average use of the device 7 hours and 2 per night  Residual AHI: 1.1 /hr (goal < 5.0 /hr)  Mask type: Nasal pillow  Device: ResMed  DME: [employer]      ASSESSMENT AND PLAN:  · Obstructive sleep apnea ( G 47.33).  The symptoms of sleep apnea have improved with the device and the treatment.  Patient's compliance with the device is excellent for treatment of sleep apnea.  I have independently reviewed the smart card down load and discussed with the patient the download data and encouarged the patient to continue to use the device.The residual AHI is acceptable. The device is benefiting the patient and the device is medically necessary.  Without proper control of sleep apnea and good compliance there is a increased risk for hypertension, diabetes mellitus and nonrestorative sleep with hypersomnia which can increase risk for motor vehicle accidents.  Untreated sleep apnea is also a risk factor for development of atrial fibrillation, pulmonary hypertension and stroke. The patient is also instructed to get the supplies from the [employer] and and change them on a regular basis.  A prescription for supplies has been sent to the [facility].  I have also discussed the good sleep hygiene habits and adequate amount of sleep needed for good health.  · Obesity  1 with BMI is Body mass index is 31.27 kg/m².. I have discuss the relationship between the weight and sleep apnea. The benefit of weight loss in reducing severity of sleep apnea was discussed. Discussed diet and exercise with the patient to achieve ideal BMI.  If the patient loses  about 30 pounds approximately we can repeat a home sleep test   · Return in about 1 year (around 1/5/2024) for with smart card down load. . Patient's questions were answered.      Justin Phipps MD  Sleep Medicine.  Medical Director, [facility] sleep [facility]  1/5/2023 ,

## 2023-01-12 ENCOUNTER — OFFICE VISIT (OUTPATIENT)
Dept: INTERNAL MEDICINE | Facility: CLINIC | Age: 78
End: 2023-01-12
Payer: MEDICARE

## 2023-01-12 VITALS
BODY MASS INDEX: 31.11 KG/M2 | SYSTOLIC BLOOD PRESSURE: 124 MMHG | HEIGHT: 74 IN | WEIGHT: 242.4 LBS | DIASTOLIC BLOOD PRESSURE: 82 MMHG

## 2023-01-12 DIAGNOSIS — R73.03 PRE-DIABETES: ICD-10-CM

## 2023-01-12 DIAGNOSIS — I10 ESSENTIAL HYPERTENSION: Chronic | ICD-10-CM

## 2023-01-12 DIAGNOSIS — E03.2 HYPOTHYROIDISM DUE TO MEDICATION: Primary | Chronic | ICD-10-CM

## 2023-01-12 DIAGNOSIS — I48.19 ATRIAL FIBRILLATION, PERSISTENT: ICD-10-CM

## 2023-01-12 DIAGNOSIS — N18.31 STAGE 3A CHRONIC KIDNEY DISEASE: ICD-10-CM

## 2023-01-12 PROCEDURE — 99214 OFFICE O/P EST MOD 30 MIN: CPT | Performed by: INTERNAL MEDICINE

## 2023-01-12 RX ORDER — LOSARTAN POTASSIUM 25 MG/1
25 TABLET ORAL DAILY
Qty: 30 TABLET | Refills: 3 | Status: SHIPPED | OUTPATIENT
Start: 2023-01-12 | End: 2023-02-01 | Stop reason: SDUPTHER

## 2023-01-12 RX ORDER — LOSARTAN POTASSIUM 25 MG/1
25 TABLET ORAL DAILY
Qty: 30 TABLET | Refills: 3 | Status: SHIPPED | OUTPATIENT
Start: 2023-01-12 | End: 2023-01-12 | Stop reason: SDUPTHER

## 2023-01-12 NOTE — PROGRESS NOTES
Subjective        Chief Complaint   Patient presents with   • Hypothyroidism           Gary Grullon is a 77 y.o. male who presents for    Patient Active Problem List   Diagnosis   • Seborrheic keratosis   • Hypercholesteremia   • Essential hypertension   • Tubular adenoma of colon   • PMR (polymyalgia rheumatica) (HCC)   • Atrial fibrillation, persistent (HCC)   • HEDY on CPAP   • Pre-diabetes   • Class 1 obesity due to excess calories without serious comorbidity with body mass index (BMI) of 31.0 to 31.9 in adult   • Paroxysmal atrial fibrillation (HCC)   • Stage 3a chronic kidney disease (HCC)   • History of cardioversion   • Tachycardia induced cardiomyopathy (HCC)   • Hypothyroidism due to medication       History of Present Illness     He has been having issues with his eyes. His cardiologist thinks it is related to his amiodarone so he stopped it. He denies chest pain, dyspnea, or palpitations. His BP has been 119-150/75-88. He feels fine except being lightheaded when he stands up. It lasts 5 seconds. He has also seen the sleep doctor. He brown not like his CPAP. He is taking a break from it. He spoke with sleep med and his goal is to lose 20 pounds.  No Known Allergies    Current Outpatient Medications on File Prior to Visit   Medication Sig Dispense Refill   • atorvastatin (LIPITOR) 20 MG tablet TAKE 1 TABLET DAILY (Patient taking differently: Take 20 mg by mouth Daily.) 90 tablet 3   • cholecalciferol (VITAMIN D3) 1000 units tablet Take 1 tablet by mouth Daily. HOLDING FOR DOS     • Eliquis 5 MG tablet tablet TAKE 1 TABLET EVERY 12 HOURS (Patient taking differently: Take 5 mg by mouth Every 12 (Twelve) Hours. HOLDING FOR DOS    LAST DOSE 10-9-22) 180 tablet 3   • levothyroxine (SYNTHROID, LEVOTHROID) 125 MCG tablet Take 125 mcg by mouth Daily.     • metoprolol succinate XL (TOPROL-XL) 25 MG 24 hr tablet Take 0.5 tablets by mouth Daily. 45 tablet 3   • Omega 3 1200 MG capsule Take 1 tablet by mouth Daily.      • vitamin C (ASCORBIC ACID) 500 MG tablet Take 2 tablets by mouth Daily. HOLDING FOR DOS     • [DISCONTINUED] sacubitril-valsartan (ENTRESTO) 24-26 MG tablet Take 1 tablet by mouth Daily.     • [DISCONTINUED] amiodarone (PACERONE) 200 MG tablet Take 1 tablet by mouth Daily. 90 tablet 3     No current facility-administered medications on file prior to visit.       Past Medical History:   Diagnosis Date   • COVID-19 01/2022   • NAION (non-arteritic anterior ischemic optic neuropathy), 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   • HEDY (obstructive sleep apnea)     AHI 15/h supine 49/h       Past Surgical History:   Procedure Laterality Date   • CATARACT EXTRACTION     • COLONOSCOPY  04/04/2018    DR Gayr Guerrero [facility]   • CYST REMOVAL  1970   • PILONIDAL CYST DRAINAGE     • TEMPORAL ARTERY BIOPSY Bilateral 10/11/2022    Procedure: BILATERAL TEMPORAL ARTERY BIOPSY;  Surgeon: José Antonio Ramirez MD;  Location: [facility];  Service: Ophthalmology;  Laterality: Bilateral;       Family History   Problem Relation Age of Onset   • COPD Mother    • Hypertension Father    • Diabetes Father    • Stroke Father    • Coronary artery disease Father    • Kidney disease Sister    • Diabetes Brother    • No Known Problems Maternal Aunt    • No Known Problems Maternal Uncle    • No Known Problems Paternal Aunt    • No Known Problems Paternal Uncle    • No Known Problems Maternal Grandmother    • No Known Problems Maternal Grandfather    • No Known Problems Paternal Grandmother    • No Known Problems Paternal Grandfather    • Anesthesia problems Neg Hx    • Broken bones Neg Hx    • Cancer Neg Hx    • Clotting disorder Neg Hx    • Collagen disease Neg Hx    • Dislocations Neg Hx    • Osteoporosis Neg Hx    • Rheumatologic disease Neg Hx    • Scoliosis Neg Hx    • Severe sprains Neg Hx    • Malig Hyperthermia Neg Hx        Social History     Socioeconomic History   • Marital status:    Tobacco Use   • Smoking status: Never   • Smokeless  "tobacco: Never   Vaping Use   • Vaping Use: Never used   Substance and Sexual Activity   • Alcohol use: Yes     Alcohol/week: 14.0 standard drinks     Types: 14 Glasses of wine per week     Comment: 2 glasses of wine with dinner/  Daily caffeine use   • Drug use: No   • Sexual activity: Defer           The following portions of the patient's history were reviewed and updated as appropriate: problem list, allergies, current medications, past medical history, past family history, past social history and past surgical history.    Review of Systems    Immunization History   Administered Date(s) Administered   • COVID-19 (MODERNA) 1st, 2nd, 3rd Dose Only 01/13/2021, 02/10/2021, 08/25/2021   • COVID-19 (MODERNA) BIVALENT BOOSTER 12+YRS 10/19/2022   • COVID-19 (MODERNA) BOOSTER 04/16/2022   • FLUAD TRI 65YR+ 10/02/2022   • Fluad Quad 65+ 09/13/2019, 10/10/2021   • Fluzone High Dose =>65 Years ([employer] ONLY) 09/01/2016, 09/06/2018, 09/01/2020   • Hepatitis A 03/21/2006, 10/25/2011   • Hepatitis B 03/21/2006, 07/08/2007, 02/02/2012   • MMR 03/13/2008   • Meningococcal, Unspecified 01/14/2013   • Pneumococcal Conjugate 13-Valent (PCV13) 08/31/2016   • Pneumococcal Conjugate 20-Valent (PCV20) 10/02/2022   • Pneumococcal Polysaccharide (PPSV23) 12/10/2011   • Polio, Unspecified 01/15/2003   • Shingrix 08/12/2019, 06/19/2020   • Tdap 01/01/2012, 11/02/2019   • Typhoid, Unspecified 01/14/2013   • Yellow Fever 01/14/2013   • Zostavax 10/19/2012, 08/12/2019       Objective   Vitals:    01/12/23 0801 01/12/23 0814   BP: 142/80 124/82   Patient Position: Sitting Standing   Weight: 110 kg (242 lb 6.4 oz)    Height: 188 cm (74\")      Body mass index is 31.12 kg/m².  Physical Exam  Vitals reviewed.   Constitutional:       Appearance: He is well-developed.   HENT:      Head: Normocephalic and atraumatic.   Cardiovascular:      Rate and Rhythm: Normal rate and regular rhythm.      Heart sounds: Normal heart sounds, S1 normal and S2 " normal.   Pulmonary:      Effort: Pulmonary effort is normal.      Breath sounds: Normal breath sounds.   Skin:     General: Skin is warm.   Neurological:      Mental Status: He is alert.   Psychiatric:         Behavior: Behavior normal.         Procedures    Assessment & Plan   Diagnoses and all orders for this visit:    1. Hypothyroidism due to medication (Primary)  Comments:  TSH at goal. Recheck in a month since off amiodarone  Orders:  -     TSH; Future  -     TSH; Future    2. Essential hypertension  Comments:  His withings monitor is not accurate. Stand slowly as BP drops with standing. Bring [employer] monitor next time  Orders:  -     Discontinue: losartan (Cozaar) 25 MG tablet; Take 1 tablet by mouth Daily.  Dispense: 30 tablet; Refill: 3  -     losartan (Cozaar) 25 MG tablet; Take 1 tablet by mouth Daily.  Dispense: 30 tablet; Refill: 3    3. Atrial fibrillation, persistent (HCC)  Comments:  Taken off amiodarone with cards since affecting eyes    4. Stage 3a chronic kidney disease (HCC)  Comments:  stable.  Orders:  -     Basic Metabolic Panel; Future    5. Pre-diabetes  Comments:  nl a1c             I spoke with Dr. Soliz. We will stop Entresto and go to Losartan 25 mg daily. If BP goes up then could go to 50 mg daily. Stand slowly. Check TSH 3 weeks since just stopped amiodarone.. Bring omron as monitor he brought in is not accurate. He is working on wt loss.    Reviewed TSH, CMP, FLP, and A1C. Cards thinks his drop in EF in the past was related to rate with afib.    Return in about 3 months (around 4/12/2023), or 30 minutes.

## 2023-02-01 ENCOUNTER — TELEPHONE (OUTPATIENT)
Dept: INTERNAL MEDICINE | Facility: CLINIC | Age: 78
End: 2023-02-01
Payer: MEDICARE

## 2023-02-01 DIAGNOSIS — I10 ESSENTIAL HYPERTENSION: Chronic | ICD-10-CM

## 2023-02-01 LAB
HBA1C MFR BLD: 5.2 % (ref 4.8–5.6)
TSH SERPL DL<=0.005 MIU/L-ACNC: 0.08 UIU/ML (ref 0.27–4.2)

## 2023-02-01 RX ORDER — LOSARTAN POTASSIUM 25 MG/1
25 TABLET ORAL DAILY
Qty: 90 TABLET | Refills: 1 | Status: SHIPPED | OUTPATIENT
Start: 2023-02-01

## 2023-02-01 RX ORDER — LEVOTHYROXINE SODIUM 0.12 MG/1
125 TABLET ORAL DAILY
Qty: 90 TABLET | Refills: 1 | Status: SHIPPED | OUTPATIENT
Start: 2023-02-01

## 2023-02-01 NOTE — TELEPHONE ENCOUNTER
He has an order for a repeat thyroid check in system for end Feb but no lab appt. Please get him lab appt in a month

## 2023-02-02 DIAGNOSIS — E03.2 HYPOTHYROIDISM DUE TO MEDICATION: Primary | ICD-10-CM

## 2023-04-13 DIAGNOSIS — E03.2 HYPOTHYROIDISM DUE TO MEDICATION: Primary | ICD-10-CM

## 2023-04-13 RX ORDER — LEVOTHYROXINE SODIUM 0.05 MG/1
50 TABLET ORAL DAILY
Qty: 30 TABLET | Refills: 1 | Status: SHIPPED | OUTPATIENT
Start: 2023-04-13

## 2023-04-25 ENCOUNTER — OFFICE VISIT (OUTPATIENT)
Dept: INTERNAL MEDICINE | Facility: CLINIC | Age: 78
End: 2023-04-25
Payer: MEDICARE

## 2023-04-25 VITALS
WEIGHT: 248.6 LBS | SYSTOLIC BLOOD PRESSURE: 142 MMHG | DIASTOLIC BLOOD PRESSURE: 86 MMHG | BODY MASS INDEX: 31.9 KG/M2 | TEMPERATURE: 96.9 F | HEIGHT: 74 IN

## 2023-04-25 DIAGNOSIS — E03.2 HYPOTHYROIDISM DUE TO MEDICATION: Primary | Chronic | ICD-10-CM

## 2023-04-25 DIAGNOSIS — I10 ESSENTIAL HYPERTENSION: Chronic | ICD-10-CM

## 2023-04-25 DIAGNOSIS — I48.0 PAROXYSMAL ATRIAL FIBRILLATION: Chronic | ICD-10-CM

## 2023-04-25 DIAGNOSIS — N18.31 STAGE 3A CHRONIC KIDNEY DISEASE: ICD-10-CM

## 2023-04-25 NOTE — PROGRESS NOTES
Subjective        Chief Complaint   Patient presents with   • Hypothyroidism   • Hypertension           Gary Grullon is a 77 y.o. male who presents for    Patient Active Problem List   Diagnosis   • Seborrheic keratosis   • Hypercholesteremia   • Essential hypertension   • Tubular adenoma of colon   • PMR (polymyalgia rheumatica)   • Atrial fibrillation, persistent (HCC)   • HEDY on CPAP   • Pre-diabetes   • Class 1 obesity due to excess calories without serious comorbidity with body mass index (BMI) of 31.0 to 31.9 in adult   • Paroxysmal atrial fibrillation   • Stage 3a chronic kidney disease   • History of cardioversion   • Tachycardia induced cardiomyopathy   • Hypothyroidism due to medication       History of Present Illness     He denies palpitations, chest pain or dyspnea. His energy is down just a little. He has gained some weight. He plays tennis four days per week. He uses his CPAP machine. He is dizzy when he stands up for 10 seconds. He has not been checking his Bp. His eyes have stabilized.  No Known Allergies    Current Outpatient Medications on File Prior to Visit   Medication Sig Dispense Refill   • atorvastatin (LIPITOR) 20 MG tablet TAKE 1 TABLET DAILY (Patient taking differently: Take 1 tablet by mouth Daily.) 90 tablet 3   • cholecalciferol (VITAMIN D3) 1000 units tablet Take 1 tablet by mouth Daily. HOLDING FOR DOS     • Eliquis 5 MG tablet tablet TAKE 1 TABLET EVERY 12 HOURS (Patient taking differently: Take 1 tablet by mouth Every 12 (Twelve) Hours. HOLDING FOR DOS    LAST DOSE 10-9-22) 180 tablet 3   • levothyroxine (Synthroid) 50 MCG tablet Take 1 tablet by mouth Daily. 30 tablet 1   • losartan (Cozaar) 25 MG tablet Take 1 tablet by mouth Daily. 90 tablet 1   • metoprolol succinate XL (TOPROL-XL) 25 MG 24 hr tablet Take 0.5 tablets by mouth Daily. 45 tablet 3   • Omega 3 1200 MG capsule Take 1 tablet by mouth Daily.     • vitamin C (ASCORBIC ACID) 500 MG tablet Take 2 tablets by mouth  Daily. HOLDING FOR DOS       No current facility-administered medications on file prior to visit.       Past Medical History:   Diagnosis Date   • COVID-19 01/2022   • NAION (non-arteritic anterior ischemic optic neuropathy), 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   • HEDY (obstructive sleep apnea)     AHI 15/h supine 49/h       Past Surgical History:   Procedure Laterality Date   • CATARACT EXTRACTION     • COLONOSCOPY  04/04/2018    DR [place] Gu[place]ro [place]   • CYST REMOVAL  1970   • PILONIDAL CYST DRAINAGE     • TEMPORAL ARTERY BIOPSY Bilateral 10/11/2022    Procedure: BILATERAL TEMPORAL ARTERY BIOPSY;  Surgeon: José Antonio Ramirez MD;  Location: [facility];  Service: Ophthalmology;  Laterality: Bilateral;       Family History   Problem Relation Age of Onset   • COPD Mother    • Hypertension Father    • Diabetes Father    • Stroke Father    • Coronary artery disease Father    • Kidney disease Sister    • Diabetes Brother    • No Known Problems Maternal Aunt    • No Known Problems Maternal Uncle    • No Known Problems Paternal Aunt    • No Known Problems Paternal Uncle    • No Known Problems Maternal Grandmother    • No Known Problems Maternal Grandfather    • No Known Problems Paternal Grandmother    • No Known Problems Paternal Grandfather    • Anesthesia problems Neg Hx    • Broken bones Neg Hx    • Cancer Neg Hx    • Clotting disorder Neg Hx    • Collagen disease Neg Hx    • Dislocations Neg Hx    • Osteoporosis Neg Hx    • Rheumatologic disease Neg Hx    • Scoliosis Neg Hx    • Severe sprains Neg Hx    • Malig Hyperthermia Neg Hx        Social History     Socioeconomic History   • Marital status:    Tobacco Use   • Smoking status: Never   • Smokeless tobacco: Never   Vaping Use   • Vaping Use: Never used   Substance and Sexual Activity   • Alcohol use: Yes     Alcohol/week: 14.0 standard drinks     Types: 14 Glasses of wine per week     Comment: 2 glasses of wine with dinner/  Daily caffeine use   • Drug use:  "No   • Sexual activity: Defer           The following portions of the patient's history were reviewed and updated as appropriate: problem list, allergies, current medications, past medical history, past family history, past social history and past surgical history.    Review of Systems    Immunization History   Administered Date(s) Administered   • COVID-19 (MODERNA) 1st,2nd,3rd Dose Monovalent 01/13/2021, 02/10/2021, 08/25/2021   • COVID-19 (MODERNA) BIVALENT 12+YRS 10/19/2022   • COVID-19 (MODERNA) Monovalent Original Booster 04/16/2022   • FLUAD TRI 65YR+ 10/02/2022   • Fluad Quad 65+ 09/13/2019, 10/10/2021   • Fluzone High Dose =>65 Years ([employer] ONLY) 09/01/2016, 09/06/2018, 09/01/2020   • Fluzone High-Dose 65+yrs 09/30/2022   • Hepatitis A 03/21/2006, 10/25/2011   • Hepatitis B Adult/Adolescent IM 03/21/2006, 07/08/2007, 02/02/2012   • MMR 03/13/2008   • Meningococcal, Unspecified 01/14/2013   • Pneumococcal Conjugate 13-Valent (PCV13) 08/31/2016   • Pneumococcal Conjugate 20-Valent (PCV20) 09/30/2022   • Pneumococcal Polysaccharide (PPSV23) 12/10/2011   • Polio, Unspecified 01/15/2003   • Shingrix 08/12/2019, 06/19/2020   • Tdap 01/01/2012, 11/02/2019   • Typhoid, Unspecified 01/14/2013   • Yellow Fever 01/14/2013   • Zostavax 10/19/2012, 08/12/2019       Objective   Vitals:    04/25/23 0858 04/25/23 0918   BP: 142/86 142/86   BP Location: Right arm Right arm   Patient Position: Sitting Standing   Temp: 96.9 °F (36.1 °C)    Weight: 113 kg (248 lb 9.6 oz)    Height: 188 cm (74.02\")      Body mass index is 31.9 kg/m².  Physical Exam  Vitals reviewed.   Constitutional:       Appearance: He is well-developed.   HENT:      Head: Normocephalic and atraumatic.   Cardiovascular:      Rate and Rhythm: Normal rate and regular rhythm.      Heart sounds: Normal heart sounds, S1 normal and S2 normal.   Pulmonary:      Effort: Pulmonary effort is normal.      Breath sounds: Normal breath sounds.   Skin:     General: Skin " is warm.   Neurological:      Mental Status: He is alert.   Psychiatric:         Behavior: Behavior normal.         Procedures    Assessment & Plan   Diagnoses and all orders for this visit:    1. Hypothyroidism due to medication (Primary)  -     TSH; Future  -     T4, Free; Future    2. Essential hypertension    3. Stage 3a chronic kidney disease    4. Paroxysmal atrial fibrillation               Reviewed TSH and BMP. Cr is stable. Recheck thyroid function in 6 weeks with r/s low dose synthroid. No change in BP meds since sounds like drops when stands at home. He will bring his Omron monitor to compare.  Return in about 6 weeks (around 6/6/2023), or 30 minutes, for Lab Before FUP.

## 2023-05-17 ENCOUNTER — HOSPITAL ENCOUNTER (OUTPATIENT)
Facility: HOSPITAL | Age: 78
Discharge: HOME | End: 2023-05-17
Attending: EMERGENCY MEDICINE | Admitting: INTERNAL MEDICINE
Payer: MEDICARE

## 2023-05-17 ENCOUNTER — APPOINTMENT (OUTPATIENT)
Dept: GENERAL RADIOLOGY | Facility: HOSPITAL | Age: 78
End: 2023-05-17
Payer: MEDICARE

## 2023-05-17 VITALS
SYSTOLIC BLOOD PRESSURE: 128 MMHG | HEIGHT: 74 IN | DIASTOLIC BLOOD PRESSURE: 85 MMHG | HEART RATE: 98 BPM | OXYGEN SATURATION: 95 % | WEIGHT: 245 LBS | TEMPERATURE: 97.7 F | RESPIRATION RATE: 16 BRPM | BODY MASS INDEX: 31.44 KG/M2

## 2023-05-17 DIAGNOSIS — I48.91 ATRIAL FIBRILLATION WITH RAPID VENTRICULAR RESPONSE: Primary | ICD-10-CM

## 2023-05-17 DIAGNOSIS — R77.8 ELEVATED TROPONIN: ICD-10-CM

## 2023-05-17 LAB
ALBUMIN SERPL-MCNC: 4.1 G/DL (ref 3.5–5.2)
ALBUMIN/GLOB SERPL: 1.7 G/DL
ALP SERPL-CCNC: 86 U/L (ref 39–117)
ALT SERPL W P-5'-P-CCNC: 19 U/L (ref 1–41)
ANION GAP SERPL CALCULATED.3IONS-SCNC: 14.5 MMOL/L (ref 5–15)
APTT PPP: 32.2 SECONDS (ref 22.7–35.4)
AST SERPL-CCNC: 23 U/L (ref 1–40)
BASOPHILS # BLD AUTO: 0.05 10*3/MM3 (ref 0–0.2)
BASOPHILS NFR BLD AUTO: 0.5 % (ref 0–1.5)
BILIRUB SERPL-MCNC: 0.3 MG/DL (ref 0–1.2)
BUN SERPL-MCNC: 19 MG/DL (ref 8–23)
BUN/CREAT SERPL: 15.2 (ref 7–25)
CALCIUM SPEC-SCNC: 9.8 MG/DL (ref 8.6–10.5)
CHLORIDE SERPL-SCNC: 108 MMOL/L (ref 98–107)
CO2 SERPL-SCNC: 22.5 MMOL/L (ref 22–29)
CREAT SERPL-MCNC: 1.25 MG/DL (ref 0.76–1.27)
DEPRECATED RDW RBC AUTO: 45.4 FL (ref 37–54)
EGFRCR SERPLBLD CKD-EPI 2021: 59.7 ML/MIN/1.73
EOSINOPHIL # BLD AUTO: 0.73 10*3/MM3 (ref 0–0.4)
EOSINOPHIL NFR BLD AUTO: 7.7 % (ref 0.3–6.2)
ERYTHROCYTE [DISTWIDTH] IN BLOOD BY AUTOMATED COUNT: 13.1 % (ref 12.3–15.4)
GEN 5 2HR TROPONIN T REFLEX: 24 NG/L
GLOBULIN UR ELPH-MCNC: 2.4 GM/DL
GLUCOSE SERPL-MCNC: 121 MG/DL (ref 65–99)
HCT VFR BLD AUTO: 46.8 % (ref 37.5–51)
HGB BLD-MCNC: 15.7 G/DL (ref 13–17.7)
IMM GRANULOCYTES # BLD AUTO: 0.04 10*3/MM3 (ref 0–0.05)
IMM GRANULOCYTES NFR BLD AUTO: 0.4 % (ref 0–0.5)
INR PPP: 1.14 (ref 0.9–1.1)
LYMPHOCYTES # BLD AUTO: 2.22 10*3/MM3 (ref 0.7–3.1)
LYMPHOCYTES NFR BLD AUTO: 23.6 % (ref 19.6–45.3)
MAGNESIUM SERPL-MCNC: 1.8 MG/DL (ref 1.6–2.4)
MCH RBC QN AUTO: 31.6 PG (ref 26.6–33)
MCHC RBC AUTO-ENTMCNC: 33.5 G/DL (ref 31.5–35.7)
MCV RBC AUTO: 94.2 FL (ref 79–97)
MONOCYTES # BLD AUTO: 0.96 10*3/MM3 (ref 0.1–0.9)
MONOCYTES NFR BLD AUTO: 10.2 % (ref 5–12)
NEUTROPHILS NFR BLD AUTO: 5.42 10*3/MM3 (ref 1.7–7)
NEUTROPHILS NFR BLD AUTO: 57.6 % (ref 42.7–76)
NRBC BLD AUTO-RTO: 0 /100 WBC (ref 0–0.2)
PLATELET # BLD AUTO: 219 10*3/MM3 (ref 140–450)
PMV BLD AUTO: 10.4 FL (ref 6–12)
POTASSIUM SERPL-SCNC: 4.1 MMOL/L (ref 3.5–5.2)
PROT SERPL-MCNC: 6.5 G/DL (ref 6–8.5)
PROTHROMBIN TIME: 14.8 SECONDS (ref 11.7–14.2)
QT INTERVAL: 310 MS
RBC # BLD AUTO: 4.97 10*6/MM3 (ref 4.14–5.8)
SODIUM SERPL-SCNC: 145 MMOL/L (ref 136–145)
T4 FREE SERPL-MCNC: 0.94 NG/DL (ref 0.93–1.7)
TROPONIN T DELTA: -3 NG/L
TROPONIN T SERPL HS-MCNC: 27 NG/L
TSH SERPL DL<=0.05 MIU/L-ACNC: 15.3 UIU/ML (ref 0.27–4.2)
WBC NRBC COR # BLD: 9.42 10*3/MM3 (ref 3.4–10.8)

## 2023-05-17 PROCEDURE — 71045 X-RAY EXAM CHEST 1 VIEW: CPT

## 2023-05-17 PROCEDURE — A9270 NON-COVERED ITEM OR SERVICE: HCPCS | Performed by: INTERNAL MEDICINE

## 2023-05-17 PROCEDURE — 99284 EMERGENCY DEPT VISIT MOD MDM: CPT

## 2023-05-17 PROCEDURE — 83735 ASSAY OF MAGNESIUM: CPT | Performed by: EMERGENCY MEDICINE

## 2023-05-17 PROCEDURE — 36415 COLL VENOUS BLD VENIPUNCTURE: CPT

## 2023-05-17 PROCEDURE — 25010000002 DIGOXIN PER 500 MCG: Performed by: EMERGENCY MEDICINE

## 2023-05-17 PROCEDURE — 63710000001 LOSARTAN 25 MG TABLET: Performed by: INTERNAL MEDICINE

## 2023-05-17 PROCEDURE — 96375 TX/PRO/DX INJ NEW DRUG ADDON: CPT

## 2023-05-17 PROCEDURE — 63710000001 APIXABAN 5 MG TABLET: Performed by: INTERNAL MEDICINE

## 2023-05-17 PROCEDURE — 84484 ASSAY OF TROPONIN QUANT: CPT | Performed by: EMERGENCY MEDICINE

## 2023-05-17 PROCEDURE — 63710000001 METOPROLOL SUCCINATE XL 25 MG TABLET SUSTAINED-RELEASE 24 HOUR: Performed by: INTERNAL MEDICINE

## 2023-05-17 PROCEDURE — 84443 ASSAY THYROID STIM HORMONE: CPT | Performed by: EMERGENCY MEDICINE

## 2023-05-17 PROCEDURE — 63710000001 LEVOTHYROXINE 50 MCG TABLET: Performed by: INTERNAL MEDICINE

## 2023-05-17 PROCEDURE — 63710000001 ATORVASTATIN 20 MG TABLET: Performed by: INTERNAL MEDICINE

## 2023-05-17 PROCEDURE — 93005 ELECTROCARDIOGRAM TRACING: CPT | Performed by: EMERGENCY MEDICINE

## 2023-05-17 PROCEDURE — 63710000001 VITAMIN C 500 MG TABLET: Performed by: INTERNAL MEDICINE

## 2023-05-17 PROCEDURE — A9270 NON-COVERED ITEM OR SERVICE: HCPCS | Performed by: EMERGENCY MEDICINE

## 2023-05-17 PROCEDURE — 85610 PROTHROMBIN TIME: CPT | Performed by: EMERGENCY MEDICINE

## 2023-05-17 PROCEDURE — 80053 COMPREHEN METABOLIC PANEL: CPT | Performed by: EMERGENCY MEDICINE

## 2023-05-17 PROCEDURE — 85025 COMPLETE CBC W/AUTO DIFF WBC: CPT | Performed by: EMERGENCY MEDICINE

## 2023-05-17 PROCEDURE — 63710000001 METOPROLOL TARTRATE 25 MG TABLET: Performed by: EMERGENCY MEDICINE

## 2023-05-17 PROCEDURE — 84439 ASSAY OF FREE THYROXINE: CPT | Performed by: EMERGENCY MEDICINE

## 2023-05-17 PROCEDURE — 96374 THER/PROPH/DIAG INJ IV PUSH: CPT

## 2023-05-17 PROCEDURE — 85730 THROMBOPLASTIN TIME PARTIAL: CPT | Performed by: EMERGENCY MEDICINE

## 2023-05-17 RX ORDER — DIGOXIN 0.25 MG/ML
250 INJECTION INTRAMUSCULAR; INTRAVENOUS ONCE
Status: COMPLETED | OUTPATIENT
Start: 2023-05-17 | End: 2023-05-17

## 2023-05-17 RX ORDER — METOPROLOL SUCCINATE 25 MG/1
25 TABLET, EXTENDED RELEASE ORAL EVERY 12 HOURS SCHEDULED
Status: DISCONTINUED | OUTPATIENT
Start: 2023-05-17 | End: 2023-05-17 | Stop reason: HOSPADM

## 2023-05-17 RX ORDER — POLYETHYLENE GLYCOL 3350 17 G/17G
17 POWDER, FOR SOLUTION ORAL DAILY PRN
Status: DISCONTINUED | OUTPATIENT
Start: 2023-05-17 | End: 2023-05-17 | Stop reason: HOSPADM

## 2023-05-17 RX ORDER — DIGOXIN 125 MCG
125 TABLET ORAL
Status: DISCONTINUED | OUTPATIENT
Start: 2023-05-18 | End: 2023-05-17

## 2023-05-17 RX ORDER — LEVOTHYROXINE SODIUM 0.05 MG/1
50 TABLET ORAL
Status: DISCONTINUED | OUTPATIENT
Start: 2023-05-17 | End: 2023-05-17 | Stop reason: HOSPADM

## 2023-05-17 RX ORDER — AMOXICILLIN 250 MG
2 CAPSULE ORAL 2 TIMES DAILY
Status: DISCONTINUED | OUTPATIENT
Start: 2023-05-17 | End: 2023-05-17 | Stop reason: HOSPADM

## 2023-05-17 RX ORDER — METOPROLOL SUCCINATE 25 MG/1
12.5 TABLET, EXTENDED RELEASE ORAL DAILY
Status: DISCONTINUED | OUTPATIENT
Start: 2023-05-17 | End: 2023-05-17

## 2023-05-17 RX ORDER — SODIUM CHLORIDE 0.9 % (FLUSH) 0.9 %
10 SYRINGE (ML) INJECTION EVERY 12 HOURS SCHEDULED
Status: DISCONTINUED | OUTPATIENT
Start: 2023-05-17 | End: 2023-05-17 | Stop reason: HOSPADM

## 2023-05-17 RX ORDER — METOPROLOL SUCCINATE 25 MG/1
25 TABLET, EXTENDED RELEASE ORAL EVERY 12 HOURS
Qty: 60 TABLET | Refills: 3
Start: 2023-05-17 | End: 2023-05-18 | Stop reason: SDUPTHER

## 2023-05-17 RX ORDER — SODIUM CHLORIDE 9 MG/ML
40 INJECTION, SOLUTION INTRAVENOUS AS NEEDED
Status: DISCONTINUED | OUTPATIENT
Start: 2023-05-17 | End: 2023-05-17 | Stop reason: HOSPADM

## 2023-05-17 RX ORDER — ASCORBIC ACID 500 MG
1000 TABLET ORAL DAILY
Status: DISCONTINUED | OUTPATIENT
Start: 2023-05-17 | End: 2023-05-17 | Stop reason: HOSPADM

## 2023-05-17 RX ORDER — SODIUM CHLORIDE 0.9 % (FLUSH) 0.9 %
10 SYRINGE (ML) INJECTION AS NEEDED
Status: DISCONTINUED | OUTPATIENT
Start: 2023-05-17 | End: 2023-05-17 | Stop reason: HOSPADM

## 2023-05-17 RX ORDER — BISACODYL 5 MG/1
5 TABLET, DELAYED RELEASE ORAL DAILY PRN
Status: DISCONTINUED | OUTPATIENT
Start: 2023-05-17 | End: 2023-05-17 | Stop reason: HOSPADM

## 2023-05-17 RX ORDER — ATORVASTATIN CALCIUM 20 MG/1
20 TABLET, FILM COATED ORAL DAILY
Status: DISCONTINUED | OUTPATIENT
Start: 2023-05-17 | End: 2023-05-17 | Stop reason: HOSPADM

## 2023-05-17 RX ORDER — BISACODYL 10 MG
10 SUPPOSITORY, RECTAL RECTAL DAILY PRN
Status: DISCONTINUED | OUTPATIENT
Start: 2023-05-17 | End: 2023-05-17 | Stop reason: HOSPADM

## 2023-05-17 RX ORDER — LOSARTAN POTASSIUM 25 MG/1
25 TABLET ORAL DAILY
Status: DISCONTINUED | OUTPATIENT
Start: 2023-05-17 | End: 2023-05-17 | Stop reason: HOSPADM

## 2023-05-17 RX ADMIN — DIGOXIN 250 MCG: 0.25 INJECTION INTRAMUSCULAR; INTRAVENOUS at 06:58

## 2023-05-17 RX ADMIN — OXYCODONE HYDROCHLORIDE AND ACETAMINOPHEN 1000 MG: 500 TABLET ORAL at 10:01

## 2023-05-17 RX ADMIN — LOSARTAN POTASSIUM 25 MG: 25 TABLET, FILM COATED ORAL at 10:03

## 2023-05-17 RX ADMIN — METOPROLOL TARTRATE 5 MG: 5 INJECTION INTRAVENOUS at 02:30

## 2023-05-17 RX ADMIN — METOPROLOL TARTRATE 25 MG: 25 TABLET, FILM COATED ORAL at 06:57

## 2023-05-17 RX ADMIN — APIXABAN 5 MG: 5 TABLET, FILM COATED ORAL at 10:00

## 2023-05-17 RX ADMIN — ATORVASTATIN CALCIUM 20 MG: 20 TABLET, FILM COATED ORAL at 10:07

## 2023-05-17 RX ADMIN — METOPROLOL TARTRATE 25 MG: 25 TABLET, FILM COATED ORAL at 03:56

## 2023-05-17 RX ADMIN — Medication 10 ML: at 10:01

## 2023-05-17 RX ADMIN — METOPROLOL TARTRATE 5 MG: 5 INJECTION INTRAVENOUS at 02:06

## 2023-05-17 RX ADMIN — METOPROLOL SUCCINATE 12.5 MG: 25 TABLET, EXTENDED RELEASE ORAL at 10:02

## 2023-05-17 RX ADMIN — LEVOTHYROXINE SODIUM 50 MCG: 0.05 TABLET ORAL at 10:00

## 2023-05-17 NOTE — H&P
Date of Admission: 2023    Encounter Provider: Rick George MD    Group of Service: [facility]     Patient Name: Gary Grullon    :1945    Chief complaint: Palpitations, atrial fibrillation.    History of Present Illness:     This is a very pleasant 77-year-old male who is normally followed by Dr. Soliz in our group.  He has a history of paroxysmal atrial fibrillation, HEDY on CPAP, and chronic kidney disease.  He has a history of atrial fibrillation and underwent cardioversion on 2022.  He was maintained on metoprolol, amiodarone, and Eliquis initially.  However, the amiodarone was discontinued in 2023 secondary to concerns for anterior ischemic optic neuropathy.    The patient went into atrial fibrillation yesterday randomly.  He could feel his heart rate and palpitations.  He states that on his Apple watch, his heart rate was in the 140s.  He received multiple doses of IV metoprolol and IV digoxin.  His rate was in the lower 100s when I saw him earlier today.  He has been faithful about taking all of his medications at home.  He has had no chest pain.    ECHO 22  • Limited echocardiogram done to evaluate left ventricular ejection fraction.  • Calculated left ventricular EF = 61.3%. Left ventricular systolic function is normal. Septal wall motion is normal. Normal left ventricular cavity size and wall thickness noted. All left ventricular wall segments contract normally. No evidence of left ventricular thrombus or mass present.  24 Hour Holter Monitor 3/1/22  • A normal monitor study.  • The predominant rhythm noted during the testing period was sinus bradycardia with average heart rate of 54 bpm: Range 41-99 bpm.  • Total PVC burden of 0.1% noted. Total PAC burden of 0.5% noted.        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     Current Outpatient Medications on File Prior to Encounter   Medication Sig Dispense Refill   • atorvastatin (LIPITOR) 20 MG tablet TAKE 1 TABLET DAILY (Patient taking differently: Take 1 tablet by mouth Daily.) 90 tablet 3   • cholecalciferol (VITAMIN D3) 1000 units tablet Take 1 tablet by mouth Daily. HOLDING FOR DOS     • Eliquis 5 MG tablet tablet TAKE 1 TABLET EVERY 12 HOURS (Patient taking differently: Take 1 tablet by mouth Every 12 (Twelve) Hours. HOLDING FOR DOS    LAST DOSE 10-9-22) 180 tablet 3   • levothyroxine (Synthroid) 50 MCG tablet Take 1 tablet by mouth Daily. 30 tablet 1   • losartan (Cozaar) 25 MG tablet Take 1 tablet by mouth Daily. 90 tablet 1   • Omega 3 1200 MG capsule Take 1 tablet by mouth Daily.     • vitamin C (ASCORBIC ACID) 500 MG tablet Take 2 tablets by mouth Daily. HOLDING FOR DOS     • [DISCONTINUED] metoprolol succinate XL (TOPROL-XL) 25 MG 24 hr tablet Take 0.5 tablets by mouth Daily. 45 tablet 3         Social History     Socioeconomic History   • Marital status:    Tobacco Use   • Smoking status: Never   • Smokeless tobacco: Never   Vaping Use   • Vaping Use: Never used   Substance and Sexual Activity   • Alcohol use: Yes     Alcohol/week: 14.0 standard drinks     Types: 14 Glasses of wine per week     Comment: 2 glasses of wine with dinner/  Daily caffeine use   • Drug use: No   • Sexual activity: Defer         Family History  "  Problem Relation Age of Onset   • COPD Mother    • Hypertension Father    • Diabetes Father    • Stroke Father    • Coronary artery disease Father    • Kidney disease Sister    • Diabetes Brother    • No Known Problems Maternal Aunt    • No Known Problems Maternal Uncle    • No Known Problems Paternal Aunt    • No Known Problems Paternal Uncle    • No Known Problems Maternal Grandmother    • No Known Problems Maternal Grandfather    • No Known Problems Paternal Grandmother    • No Known Problems Paternal Grandfather    • Anesthesia problems Neg Hx    • Broken bones Neg Hx    • Cancer Neg Hx    • Clotting disorder Neg Hx    • Collagen disease Neg Hx    • Dislocations Neg Hx    • Osteoporosis Neg Hx    • Rheumatologic disease Neg Hx    • Scoliosis Neg Hx    • Severe sprains Neg Hx    • Malig Hyperthermia Neg Hx        REVIEW OF SYSTEMS:   Pertinent positives are noted in the HPI above.  Otherwise, all other systems are reviewed, and are negative.     Objective:     Vitals:    05/17/23 0657 05/17/23 0707 05/17/23 0730 05/17/23 1331   BP:   130/89 128/85   BP Location:   Left arm Left arm   Patient Position:   Lying Lying   Pulse: 107 110 105 98   Resp:   18 16   Temp:   97.5 °F (36.4 °C) 97.7 °F (36.5 °C)   TempSrc:   Oral Oral   SpO2: 96% 98% 97% 95%   Weight:       Height:         Body mass index is 31.46 kg/m².  Flowsheet Rows    Flowsheet Row First Filed Value   Admission Height 188 cm (74\") Documented at 05/17/2023 0200   Admission Weight 111 kg (245 lb) Documented at 05/17/2023 0200           General:    No acute distress, alert and oriented x4, pleasant                   Head:    Normocephalic, atraumatic.   Eyes:          Conjunctivae and sclerae normal, no icterus.   Throat:   No oral lesions, no thrush, oral mucosa moist.    Neck:   Supple, trachea midline.   Lungs:     Clear to auscultation bilaterally     Heart:    Irregularly irregular rhythm and mildly tachycardic rate.  No murmurs, gallops, or rubs " noted.   Abdomen:     Soft, non-tender, non-distended, positive bowel sounds.    Extremities:   No clubbing, cyanosis, or edema.     Pulses:   Pulses palpable and equal bilaterally.    Skin:   No bleeding or rash.   Neuro:   Non-focal.  Moves all extremities well.    Psychiatric:   Normal mood and affect.     Lab Review:                Results from last 7 days   Lab Units 05/17/23  0203   SODIUM mmol/L 145   POTASSIUM mmol/L 4.1   CHLORIDE mmol/L 108*   CO2 mmol/L 22.5   BUN mg/dL 19   CREATININE mg/dL 1.25   GLUCOSE mg/dL 121*   CALCIUM mg/dL 9.8     Results from last 7 days   Lab Units 05/17/23  0359 05/17/23  0203   HSTROP T ng/L 24* 27*     Results from last 7 days   Lab Units 05/17/23  0203   WBC 10*3/mm3 9.42   HEMOGLOBIN g/dL 15.7   HEMATOCRIT % 46.8   PLATELETS 10*3/mm3 219     Results from last 7 days   Lab Units 05/17/23  0203   INR  1.14*   APTT seconds 32.2         Results from last 7 days   Lab Units 05/17/23  0203   MAGNESIUM mg/dL 1.8           EKG (reviewed by me personally):              Assessment:   1.  Paroxysmal atrial fibrillation, symptomatic (as noted above)  2.  HEDY, on CPAP  3.  Chronic kidney disease    Plan:       I had a long discussion with the patient this morning.  He had to come off of amiodarone in January 2023 with concerns for an optic nerve issue.  He has done well up until this point with amiodarone.  He is symptomatic with the atrial fibrillation, and we could simply cardiovert him again if needed.  However, Dr. Zamudio is going to see him from electrophysiology later today.  I will defer to his recommendations.  He may ultimately be a candidate for an ablation, although I am doubtful that an ablation would be performed urgently as he is not unstable or highly symptomatic.  He will continue on the Eliquis for anticoagulation.  I kept him on the metoprolol for now as well.  Further plans will be made pending Dr. Zamudio's consultation later today.    Luis Alfredo George MD

## 2023-05-17 NOTE — DISCHARGE SUMMARY
DISCHARGE NOTE    Patient Name: Gary Grullon  Age/Sex: 77 y.o. male  : 1945  MRN: [id number]    Date of Discharge:  2023   Date of Admit: 2023  Encounter Provider: PAULA Smart  Place of Service: [facility] CARDIOLOGY  Patient Care Team:  Rommel Wilkerson MD as PCP - General (Internal Medicine)  Yash Soliz MD as Consulting Physician (Cardiology)    Subjective:     Discharge Diagnosis:    Atrial fibrillation with rapid ventricular response      Hospital Course:       See EP consult for dc plan    Going home today, he may spontaneously convert to SR but plans for admission for sotalol + CV on  and ablation later this year after [place].     Increase oral metoprolol dose for now.   Vital Signs  Temp:  [97.5 °F (36.4 °C)-97.8 °F (36.6 °C)] 97.7 °F (36.5 °C)  Heart Rate:  [] 98  Resp:  [16-18] 16  BP: (111-163)/() 128/85    Intake/Output Summary (Last 24 hours) at 2023 1555  Last data filed at 2023 1331  Gross per 24 hour   Intake 0 ml   Output --   Net 0 ml       Physical Exam:    General Appearance: No acute distress, well developed and well nourished.   Eyes: Conjunctiva and lids: No erythema, swelling, or discharge. Sclera non-icteric.   HENT: Atraumatic, normocephalic. External eyes, ears, and nose normal.   Respiratory: No signs of respiratory distress. Respiration rhythm and depth normal.   Cardiovascular:  Heart Rate and Rhythm: Irregularly, irregular.Heart Sounds: S1 and S2  Murmurs: No murmurs noted. No rubs, thrills, or gallops.   Arterial Pulses: Posterior tibialis and dorsalis pedis pulses normal.   Lower Extremities: No edema noted.  Gastrointestinal:  Abdomen soft, non-distended, non-tender.  Musculoskeletal: Normal movement of extremities  Skin: Warm and dry.   Psychiatric: Patient  alert and oriented to person, place, and time. Speech and behavior appropriate. Normal mood and affect.    Labs:   Results from last 7 days   Lab Units 05/17/23  0203   SODIUM mmol/L 145   POTASSIUM mmol/L 4.1   CHLORIDE mmol/L 108*   CO2 mmol/L 22.5   BUN mg/dL 19   CREATININE mg/dL 1.25   GLUCOSE mg/dL 121*   CALCIUM mg/dL 9.8   AST (SGOT) U/L 23   ALT (SGPT) U/L 19     Results from last 7 days   Lab Units 05/17/23  0359 05/17/23  0203   HSTROP T ng/L 24* 27*     Results from last 7 days   Lab Units 05/17/23  0203   WBC 10*3/mm3 9.42   HEMOGLOBIN g/dL 15.7   HEMATOCRIT % 46.8   PLATELETS 10*3/mm3 219     Results from last 7 days   Lab Units 05/17/23 0203   INR  1.14*   APTT seconds 32.2     Results from last 7 days   Lab Units 05/17/23 0203   MAGNESIUM mg/dL 1.8             Results from last 7 days   Lab Units 05/17/23 0203   TSH uIU/mL 15.300*       Discharge Diet:    Dietary Orders (From admission, onward)     Start     Ordered    05/17/23 0901  Diet: Cardiac Diets; Healthy Heart (2-3 Na+); Texture: Regular Texture (IDDSI 7); Fluid Consistency: Thin (IDDSI 0)  Diet Effective Now        References:    Diet Order [place]   Question Answer Comment   Diets: Cardiac Diets    Cardiac Diet: Healthy Heart (2-3 Na+)    Texture: Regular Texture (IDDSI 7)    Fluid Consistency: Thin (IDDSI 0)        05/17/23 0900                  Activity at Discharge:  instructions given to patient    Discharge Medications     Discharge Medications      Changes to Medications      Instructions Start Date   Eliquis 5 MG tablet tablet  Generic drug: apixaban  What changed:   · how much to take  · when to take this  · additional instructions   TAKE 1 TABLET EVERY 12 HOURS      metoprolol succinate XL 25 MG 24 hr tablet  Commonly known as: TOPROL-XL  What changed:   · how much to take  · when to take this   25 mg, Oral, Every 12 Hours         Continue These Medications      Instructions Start Date   atorvastatin 20 MG tablet  Commonly  known as: LIPITOR   TAKE 1 TABLET DAILY      cholecalciferol 25 MCG (1000 UT) tablet  Commonly known as: VITAMIN D3   1,000 Units, Oral, Daily, HOLDING FOR DOS      levothyroxine 50 MCG tablet  Commonly known as: Synthroid   50 mcg, Oral, Daily      losartan 25 MG tablet  Commonly known as: Cozaar   25 mg, Oral, Daily      vitamin C 500 MG tablet  Commonly known as: ASCORBIC ACID   1,000 mg, Oral, Daily, HOLDING FOR DOS         Stop These Medications    Omega 3 1200 MG capsule            Discharge disposition: home    Follow-up Appointments   Follow-up Information     Rommel Wilkerson MD .    Specialty: Internal Medicine  Contact information:  [address] [address]  [phone]                       Future Appointments   Date Time Provider Department Center   5/31/2023 11:30 AM [facility]   6/6/2023 10:45 AM Rommel Wilkerson MD [facility]   7/7/2023 10:30 AM Yash Soliz MD [facility]   1/11/2024  8:45 AM Justin Phipps MD [facility] None         Angie Duncan, APRN  05/17/23  15:55 EDT

## 2023-05-17 NOTE — CASE MANAGEMENT/SOCIAL WORK
Discharge Planning Assessment  [facility]     Patient Name: Gary Grullon  MRN: [id number]  Today's Date: 5/17/2023    Admit Date: 5/17/2023    Plan: Home with family support, pt will drive self home   Discharge Needs Assessment     Row Name 05/17/23 1017       Living Environment    People in Home spouse    Name(s) of People in Home Elle Grullon [phone]    Current Living Arrangements home    Potentially Unsafe Housing Conditions none    Primary Care Provided by self    Provides Primary Care For no one    Family Caregiver if Needed spouse    Family Caregiver Names Elle Grullon    Quality of Family Relationships helpful;involved    Able to Return to Prior Arrangements yes       Resource/Environmental Concerns    Resource/Environmental Concerns none    Transportation Concerns none       Food Insecurity    Within the past 12 months, you worried that your food would run out before you got the money to buy more. Never true    Within the past 12 months, the food you bought just didn't last and you didn't have money to get more. Never true       Transition Planning    Patient/Family Anticipates Transition to home;home with family    Patient/Family Anticipated Services at Transition none    Transportation Anticipated car, drives self       Discharge Needs Assessment    Readmission Within the Last 30 Days no previous admission in last 30 days    Equipment Currently Used at Home none    Concerns to be Addressed no discharge needs identified;denies needs/concerns at this time    Anticipated Changes Related to Illness none    Equipment Needed After Discharge none    Provided Post Acute Provider List? N/A    Provided Post Acute Provider Quality & Resource List? N/A               Discharge Plan     Row Name 05/17/23 1019       Plan    Plan Home with family support, pt will drive self home    Patient/Family in Agreement with Plan yes    Provided Post Acute Provider List? N/A    Provided Post Acute Provider Quality &  Resource List? N/A    Plan Comments Met with pt at bedside. Explained role of . Verified face sheet, pts PCP is Rommel Wilkerson. Pt denies any difficulty paying for medications, pharmacy is [facility]. Pt lives with spouse and is she is able to provide any assistance if needed. Pt is independent in ADL's and does not require any assistive devices. Pt is able to maneuver about without any difficulty. Denies the need for any home health or rehab. Upon discharge, pt will drive self home. Explained that CCP would follow to assess for discharge needs              Continued Care and Services - Admitted Since 5/17/2023    Coordination has not been started for this encounter.       Expected Discharge Date and Time     Expected Discharge Date Expected Discharge Time    May 19, 2023          Demographic Summary    No documentation.                Functional Status    No documentation.                Psychosocial    No documentation.                Abuse/Neglect    No documentation.                Legal    No documentation.                Substance Abuse    No documentation.                Patient Forms    No documentation.                   Cheyanne Stanley RN

## 2023-05-17 NOTE — PLAN OF CARE
Goal Outcome Evaluation:  Plan of Care Reviewed With: patient      Outcome Evaluation: Patient being discharged home. IV and heart monitor removed. Patient verbalizes understanding.

## 2023-05-17 NOTE — ED PROVIDER NOTES
EMERGENCY DEPARTMENT ENCOUNTER    Room Number:  N439/1  Date seen:  5/17/2023  PCP: Rommel Wilkerson MD  Historian: Patient/spouse      HPI:  Chief Complaint: Palpitations  A complete HPI/ROS/PMH/PSH/SH/FH are unobtainable due to: None  Context: Gary Grullon is a 77 y.o. male who presents to the ED c/o sudden onset of heart palpitations that occurred at around 8 PM last night while at home at rest.  The patient does have a history of atrial fibrillation but has not had an issue with that since undergoing a cardioversion over a year ago.  Currently, he does report the high heart rate/palpitations but denies chest pain, shortness of breath, nausea/vomiting, weakness, or fevers and chills.            PAST MEDICAL HISTORY  Active Ambulatory Problems     Diagnosis Date Noted   • Seborrheic keratosis 06/29/2012   • Hypercholesteremia 05/30/2019   • Essential hypertension 05/30/2019   • Tubular adenoma of colon 05/30/2019   • PMR (polymyalgia rheumatica) 03/12/2020   • Atrial fibrillation, persistent (HCC) 06/18/2020   • HEDY on CPAP 07/23/2020   • Pre-diabetes 07/31/2020   • Class 1 obesity due to excess calories without serious comorbidity with body mass index (BMI) of 31.0 to 31.9 in adult 01/07/2021   • Paroxysmal atrial fibrillation 01/19/2021   • Stage 3a chronic kidney disease 06/15/2021   • History of cardioversion 02/24/2022   • 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   • Hypothyroidism due to medication 11/08/2022     Resolved Ambulatory Problems     Diagnosis Date Noted   • Renal insufficiency, mild 05/30/2019   • Fracture of elbow 05/30/2019     Past Medical History:   Diagnosis Date   • COVID-19 01/2022   • NAION (non-arteritic anterior ischemic optic neuropathy), 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   • HEDY (obstructive sleep apnea)          PAST SURGICAL HISTORY  Past Surgical History:   Procedure Laterality Date   • CATARACT EXTRACTION     • COLONOSCOPY  04/04/2018    DR Gary Guerrero [facility]   • CYST  REMOVAL  1970   • PILONIDAL CYST DRAINAGE     • TEMPORAL ARTERY BIOPSY Bilateral 10/11/2022    Procedure: BILATERAL TEMPORAL ARTERY BIOPSY;  Surgeon: José Antonio Ramirez MD;  Location: [facility];  Service: Ophthalmology;  Laterality: Bilateral;         FAMILY HISTORY  Family History   Problem Relation Age of Onset   • COPD Mother    • Hypertension Father    • Diabetes Father    • Stroke Father    • Coronary artery disease Father    • Kidney disease Sister    • Diabetes Brother    • No Known Problems Maternal Aunt    • No Known Problems Maternal Uncle    • No Known Problems Paternal Aunt    • No Known Problems Paternal Uncle    • No Known Problems Maternal Grandmother    • No Known Problems Maternal Grandfather    • No Known Problems Paternal Grandmother    • No Known Problems Paternal Grandfather    • Anesthesia problems Neg Hx    • Broken bones Neg Hx    • Cancer Neg Hx    • Clotting disorder Neg Hx    • Collagen disease Neg Hx    • Dislocations Neg Hx    • Osteoporosis Neg Hx    • Rheumatologic disease Neg Hx    • Scoliosis Neg Hx    • Severe sprains Neg Hx    • Malig Hyperthermia Neg Hx          SOCIAL HISTORY  Social History     Socioeconomic History   • Marital status:    Tobacco Use   • Smoking status: Never   • Smokeless tobacco: Never   Vaping Use   • Vaping Use: Never used   Substance and Sexual Activity   • Alcohol use: Yes     Alcohol/week: 14.0 standard drinks     Types: 14 Glasses of wine per week     Comment: 2 glasses of wine with dinner/  Daily caffeine use   • Drug use: No   • Sexual activity: Defer         ALLERGIES  Patient has no known allergies.        REVIEW OF SYSTEMS  Review of Systems   Constitutional: Negative for activity change, appetite change and fever.   HENT: Negative for congestion and sore throat.    Eyes: Negative.    Respiratory: Negative for cough and shortness of breath.    Cardiovascular: Positive for palpitations. Negative for chest pain and leg swelling.    Gastrointestinal: Negative for abdominal pain, diarrhea and vomiting.   Endocrine: Negative.    Genitourinary: Negative for decreased urine volume and dysuria.   Musculoskeletal: Negative for neck pain.   Skin: Negative for rash and wound.   Allergic/Immunologic: Negative.    Neurological: Negative for weakness, numbness and headaches.   Hematological: Negative.    Psychiatric/Behavioral: Negative.    All other systems reviewed and are negative.         PHYSICAL EXAM  ED Triage Vitals   Temp Heart Rate Resp BP SpO2   05/17/23 0152 05/17/23 0152 05/17/23 0152 05/17/23 0200 05/17/23 0152   97.8 °F (36.6 °C) 91 16 (!) 163/119 97 %      Temp src Heart Rate Source Patient Position BP Location FiO2 (%)   05/17/23 0152 05/17/23 0152 -- 05/17/23 0200 --   Tympanic Monitor  Right arm        Physical Exam  Vitals and nursing note reviewed.   Constitutional:       General: He is not in acute distress.  HENT:      Head: Normocephalic and atraumatic.   Eyes:      Pupils: Pupils are equal, round, and reactive to light.   Cardiovascular:      Rate and Rhythm: Tachycardia present. Rhythm irregularly irregular.      Heart sounds: Normal heart sounds.   Pulmonary:      Effort: Pulmonary effort is normal. No respiratory distress.      Breath sounds: Normal breath sounds.   Abdominal:      Palpations: Abdomen is soft.      Tenderness: There is no abdominal tenderness. There is no guarding or rebound.   Musculoskeletal:         General: Normal range of motion.      Cervical back: Normal range of motion and neck supple.   Skin:     General: Skin is warm and dry.   Neurological:      Mental Status: He is alert and oriented to person, place, and time.      Sensory: Sensation is intact.   Psychiatric:         Mood and Affect: Mood and affect normal.         Vital signs and nursing notes reviewed.          LAB RESULTS  Recent Results (from the past 24 hour(s))   Comprehensive Metabolic Panel    Collection Time: 05/17/23  2:03 AM     Specimen: Blood   Result Value Ref Range    Glucose 121 (H) 65 - 99 mg/dL    BUN 19 8 - 23 mg/dL    Creatinine 1.25 0.76 - 1.27 mg/dL    Sodium 145 136 - 145 mmol/L    Potassium 4.1 3.5 - 5.2 mmol/L    Chloride 108 (H) 98 - 107 mmol/L    CO2 22.5 22.0 - 29.0 mmol/L    Calcium 9.8 8.6 - 10.5 mg/dL    Total Protein 6.5 6.0 - 8.5 g/dL    Albumin 4.1 3.5 - 5.2 g/dL    ALT (SGPT) 19 1 - 41 U/L    AST (SGOT) 23 1 - 40 U/L    Alkaline Phosphatase 86 39 - 117 U/L    Total Bilirubin 0.3 0.0 - 1.2 mg/dL    Globulin 2.4 gm/dL    A/G Ratio 1.7 g/dL    BUN/Creatinine Ratio 15.2 7.0 - 25.0    Anion Gap 14.5 5.0 - 15.0 mmol/L    eGFR 59.7 (L) >60.0 mL/min/1.73   Protime-INR    Collection Time: 05/17/23  2:03 AM    Specimen: Blood   Result Value Ref Range    Protime 14.8 (H) 11.7 - 14.2 Seconds    INR 1.14 (H) 0.90 - 1.10   aPTT    Collection Time: 05/17/23  2:03 AM    Specimen: Blood   Result Value Ref Range    PTT 32.2 22.7 - 35.4 seconds   High Sensitivity Troponin T    Collection Time: 05/17/23  2:03 AM    Specimen: Blood   Result Value Ref Range    HS Troponin T 27 (H) <15 ng/L   Magnesium    Collection Time: 05/17/23  2:03 AM    Specimen: Blood   Result Value Ref Range    Magnesium 1.8 1.6 - 2.4 mg/dL   CBC Auto Differential    Collection Time: 05/17/23  2:03 AM    Specimen: Blood   Result Value Ref Range    WBC 9.42 3.40 - 10.80 10*3/mm3    RBC 4.97 4.14 - 5.80 10*6/mm3    Hemoglobin 15.7 13.0 - 17.7 g/dL    Hematocrit 46.8 37.5 - 51.0 %    MCV 94.2 79.0 - 97.0 fL    MCH 31.6 26.6 - 33.0 pg    MCHC 33.5 31.5 - 35.7 g/dL    RDW 13.1 12.3 - 15.4 %    RDW-SD 45.4 37.0 - 54.0 fl    MPV 10.4 6.0 - 12.0 fL    Platelets 219 140 - 450 10*3/mm3    Neutrophil % 57.6 42.7 - 76.0 %    Lymphocyte % 23.6 19.6 - 45.3 %    Monocyte % 10.2 5.0 - 12.0 %    Eosinophil % 7.7 (H) 0.3 - 6.2 %    Basophil % 0.5 0.0 - 1.5 %    Immature Grans % 0.4 0.0 - 0.5 %    Neutrophils, Absolute 5.42 1.70 - 7.00 10*3/mm3    Lymphocytes, Absolute 2.22 0.70 -  3.10 10*3/mm3    Monocytes, Absolute 0.96 (H) 0.10 - 0.90 10*3/mm3    Eosinophils, Absolute 0.73 (H) 0.00 - 0.40 10*3/mm3    Basophils, Absolute 0.05 0.00 - 0.20 10*3/mm3    Immature Grans, Absolute 0.04 0.00 - 0.05 10*3/mm3    nRBC 0.0 0.0 - 0.2 /100 WBC   TSH    Collection Time: 05/17/23  2:03 AM    Specimen: Blood   Result Value Ref Range    TSH 15.300 (H) 0.270 - 4.200 uIU/mL   T4, Free    Collection Time: 05/17/23  2:03 AM    Specimen: Blood   Result Value Ref Range    Free T4 0.94 0.93 - 1.70 ng/dL   ECG 12 Lead Tachycardia    Collection Time: 05/17/23  2:08 AM   Result Value Ref Range    QT Interval 310 ms   High Sensitivity Troponin T 2Hr    Collection Time: 05/17/23  3:59 AM    Specimen: Blood   Result Value Ref Range    HS Troponin T 24 (H) <15 ng/L    Troponin T Delta -3 >=-4 - <+4 ng/L       Ordered the above labs and reviewed the results.        RADIOLOGY  XR Chest 1 View    Result Date: 5/17/2023  SINGLE VIEW OF THE CHEST  HISTORY: Atrial fibrillation  COMPARISON: 09/12/2022  FINDINGS: There is mild cardiomegaly. No pneumothorax, pleural effusion, or infiltrate is seen. There is calcification of the aorta.      No acute findings.  This report was finalized on 5/17/2023 2:48 AM by Dr. Misty Berger M.D.        Ordered the above noted radiological studies. Reviewed by me in PACS.            PROCEDURES  Critical Care  Performed by: Wilfredo Peter MD  Authorized by: Wilfredo Peter MD     Critical care provider statement:     Critical care time (minutes):  35    Critical care time was exclusive of:  Separately billable procedures and treating other patients    Critical care was necessary to treat or prevent imminent or life-threatening deterioration of the following conditions:  Circulatory failure (A-fib with RVR)    Critical care was time spent personally by me on the following activities:  Blood draw for specimens, development of treatment plan with patient or surrogate, discussions with  consultants, evaluation of patient's response to treatment, examination of patient, obtaining history from patient or surrogate, ordering and performing treatments and interventions, ordering and review of laboratory studies, ordering and review of radiographic studies, pulse oximetry, re-evaluation of patient's condition and review of old charts    Care discussed with: admitting provider          EKG independently interpreted by myself as follows:    EKG          EKG time: 0208  Rhythm/Rate: atrial fibrillation with RVR, 144  P waves and NJ: absent  QRS, axis: nml, LAD   ST and T waves: nml     Interpreted Contemporaneously by me, independently viewed  changed compared to prior 2/16/22            MEDICATIONS GIVEN IN ER  Medications   metoprolol tartrate (LOPRESSOR) injection 5 mg (5 mg Intravenous Given 5/17/23 0206)   metoprolol tartrate (LOPRESSOR) injection 5 mg (5 mg Intravenous Given 5/17/23 0230)   metoprolol tartrate (LOPRESSOR) tablet 25 mg (25 mg Oral Given 5/17/23 0356)   digoxin (LANOXIN) injection 250 mcg (250 mcg Intravenous Given 5/17/23 0658)   metoprolol tartrate (LOPRESSOR) tablet 25 mg (25 mg Oral Given 5/17/23 0657)                   MEDICAL DECISION MAKING, PROGRESS, and CONSULTS    All labs have been independently reviewed by me.  All radiology studies have been reviewed by me and I have also reviewed the radiology report.   EKG's independently viewed and interpreted by me.  Discussion below represents my analysis of pertinent findings related to patient's condition, differential diagnosis, treatment plan and final disposition.      Additional sources:  - Discussed/ obtained information from independent historians: History obtained from the patient as well as the patient's spouse at bedside.    - External (non-ED) record review: Upon medical records review, the patient was last seen and evaluated by his cardiologist, Dr. Soliz, on 1/3/2023 in the office secondary to follow-up for his  known history of atrial fibrillation.    - Chronic or social conditions impacting care: Paroxysmal atrial fibrillation    - Shared decision making: Admission decision based on shared conversations have between myself as well as the patient and the patient's spouse at bedside.    The case was discussed with Dr. Soliz, cardiology, who will admit the patient to the hospital.        Orders placed during this visit:  Orders Placed This Encounter   Procedures   • Critical Care   • XR Chest 1 View   • Comprehensive Metabolic Panel   • Protime-INR   • aPTT   • High Sensitivity Troponin T   • Magnesium   • CBC Auto Differential   • TSH   • T4, Free   • High Sensitivity Troponin T 2Hr   • Cardioversion External in Cardiology Department   • ECG 12 Lead Tachycardia   • Discharge patient   • CBC & Differential             Differential diagnosis includes but is not limited to:    Differential diagnosis includes but is not limited to atrial fibrillation with rapid ventricular response, acute coronary syndrome, cardiac rhythm disturbance, sepsis, acute anemia, or severe electrolyte disturbance.      Independent interpretation of labs, radiology studies, and discussions with consultants:    Chest x-ray independently interpreted by myself with my interpretation as no cardiomegaly also without signs of edema, infiltrate, or pneumothorax.        ED Course as of 05/17/23 2310   Wed May 17, 2023   0227 The patient's heart rate has slightly improved but is still in the 120s and he remains in A-fib with RVR.  We will give another dose of IV metoprolol and reassess following. [BM]   0122 The patient's heart rate remains 110-120 and and a rapid A-fib.  I informed the patient that his work-up in the ED thus far is unremarkable but given the fact that he is still in a rapid atrial fibrillation, we will admit him to the hospital today for further cardiovascular assessment.  The patient as well as his spouse agrees with that plan and all  questions have been answered. [BM]   0353 We will treat with an oral dose of metoprolol and proceed with admission. [BM]   0603 The patient's presentation, work-up, as well as diagnosis and treatment plan were discussed with the cardiologist, Dr. Soliz.  He recommends another 25 mg of p.o. oral metoprolol as well as IV digoxin.  He will admit the patient to the hospital today for further management and treatment. [BM]      ED Course User Index  [BM] Wilfredo Peter MD               DIAGNOSIS  Final diagnoses:   Atrial fibrillation with rapid ventricular response   Elevated troponin         DISPOSITION  ADMISSION    Discussed treatment plan and reason for admission with pt/family and admitting physician.  Pt/family voiced understanding of the plan for admission for further testing/treatment as needed.                 Latest Documented Vital Signs:  As of 23:10 EDT  BP- 128/85 HR- 98 Temp- 97.7 °F (36.5 °C) (Oral) O2 sat- 95%              --    Please note that portions of this were completed with a voice recognition program.       Note Disclaimer: At [facility], we believe that sharing information builds trust and better relationships. You are receiving this note because you are receiving care at [facility] or recently visited. It is possible you will see health information before a provider has talked with you about it. This kind of information can be easy to misunderstand. To help you fully understand what it means for your health, we urge you to discuss this note with your provider.             Wilfredo Peter MD  05/17/23 7736

## 2023-05-17 NOTE — ED TRIAGE NOTES
"Pt reports hx of afib, experiencing heart palpitations beginning this morning while driving, mild lightheadedness, and \"full body vibration\" sensation. Denies CP or SOA.   "

## 2023-05-17 NOTE — ED NOTES
"\"Nursing report ED to floor  Gary Grullon  77 y.o.  male    HPI :   Chief Complaint   Patient presents with    Palpitations       Admitting doctor:   Yash Soliz MD    Admitting diagnosis:   The primary encounter diagnosis was Atrial fibrillation with rapid ventricular response. A diagnosis of Elevated troponin was also pertinent to this visit.    Code status:   Current Code Status       Date Active Code Status Order ID Comments User Context       Not on file            Allergies:   Patient has no known allergies.    Isolation:   No active isolations    Intake and Output  No intake or output data in the 24 hours ending 05/17/23 0647    Weight:       05/17/23  0200   Weight: 111 kg (245 lb)       Most recent vitals:   Vitals:    05/17/23 0611 05/17/23 0630 05/17/23 0631 05/17/23 0642   BP: (!) 132/104  (!) 137/103    BP Location:       Pulse: 95 110 105 112   Resp:       Temp:       TempSrc:       SpO2: 95% 96%  97%   Weight:       Height:           Active LDAs/IV Access:   Lines, Drains & Airways       Active LDAs       Name Placement date Placement time Site Days    Peripheral IV 05/17/23 0203 Anterior;Left Forearm 05/17/23 0203  Forearm  less than 1                    Labs (abnormal labs have a star):   Labs Reviewed   COMPREHENSIVE METABOLIC PANEL - Abnormal; Notable for the following components:       Result Value    Glucose 121 (*)     Chloride 108 (*)     eGFR 59.7 (*)     All other components within normal limits    Narrative:     GFR Normal >60  Chronic Kidney Disease <60  Kidney Failure <15    The GFR formula is only valid for adults with stable renal function between ages 18 and 70.   PROTIME-INR - Abnormal; Notable for the following components:    Protime 14.8 (*)     INR 1.14 (*)     All other components within normal limits   TROPONIN - Abnormal; Notable for the following components:    HS Troponin T 27 (*)     All other components within normal limits    Narrative:     High Sensitive " Troponin T Reference Range:  <10.0 ng/L- Negative Female for AMI  <15.0 ng/L- Negative Male for AMI  >=10 - Abnormal Female indicating possible myocardial injury.  >=15 - Abnormal Male indicating possible myocardial injury.   Clinicians would have to utilize clinical acumen, EKG, Troponin, and serial changes to determine if it is an Acute Myocardial Infarction or myocardial injury due to an underlying chronic condition.        CBC WITH AUTO DIFFERENTIAL - Abnormal; Notable for the following components:    Eosinophil % 7.7 (*)     Monocytes, Absolute 0.96 (*)     Eosinophils, Absolute 0.73 (*)     All other components within normal limits   TSH - Abnormal; Notable for the following components:    TSH 15.300 (*)     All other components within normal limits   HIGH SENSITIVITIY TROPONIN T 2HR - Abnormal; Notable for the following components:    HS Troponin T 24 (*)     All other components within normal limits    Narrative:     High Sensitive Troponin T Reference Range:  <10.0 ng/L- Negative Female for AMI  <15.0 ng/L- Negative Male for AMI  >=10 - Abnormal Female indicating possible myocardial injury.  >=15 - Abnormal Male indicating possible myocardial injury.   Clinicians would have to utilize clinical acumen, EKG, Troponin, and serial changes to determine if it is an Acute Myocardial Infarction or myocardial injury due to an underlying chronic condition.        APTT - Normal   MAGNESIUM - Normal   T4, FREE - Normal    Narrative:     Results may be falsely increased if patient taking Biotin.     CBC AND DIFFERENTIAL    Narrative:     The following orders were created for panel order CBC & Differential.  Procedure                               Abnormality         Status                     ---------                               -----------         ------                     CBC Auto Differential[[id number]]        Abnormal            Final result                 Please view results for these tests on the individual  orders.       EKG:   ECG 12 Lead Tachycardia   Preliminary Result   HEART RATE= 144  bpm   RR Interval= 417  ms   MI Interval=   ms   P Horizontal Axis=   deg   P Front Axis=   deg   QRSD Interval= 97  ms   QT Interval= 310  ms   QRS Axis= -52  deg   T Wave Axis= 70  deg   - ABNORMAL ECG -   Atrial fibrillation   Left anterior fascicular block   Abnormal R-wave progression, early transition   Borderline prolonged QT interval   Baseline wander in lead(s) V1   Electronically Signed By:    Date and Time of Study: 2023-05-17 02:08:17          Meds given in ED:   Medications   sodium chloride 0.9 % flush 10 mL (has no administration in time range)   digoxin (LANOXIN) injection 250 mcg (has no administration in time range)   metoprolol tartrate (LOPRESSOR) tablet 25 mg (has no administration in time range)   metoprolol tartrate (LOPRESSOR) injection 5 mg (5 mg Intravenous Given 5/17/23 0206)   metoprolol tartrate (LOPRESSOR) injection 5 mg (5 mg Intravenous Given 5/17/23 0230)   metoprolol tartrate (LOPRESSOR) tablet 25 mg (25 mg Oral Given 5/17/23 0356)       Imaging results:  XR Chest 1 View    Result Date: 5/17/2023  No acute findings.  This report was finalized on 5/17/2023 2:48 AM by Dr. Misty Berger M.D.       Ambulatory status:   - up as tolerated     Social issues:   Social History     Socioeconomic History    Marital status:    Tobacco Use    Smoking status: Never    Smokeless tobacco: Never   Vaping Use    Vaping Use: Never used   Substance and Sexual Activity    Alcohol use: Yes     Alcohol/week: 14.0 standard drinks     Types: 14 Glasses of wine per week     Comment: 2 glasses of wine with dinner/  Daily caffeine use    Drug use: No    Sexual activity: Defer       NIH Stroke Scale:         Dina Cheatham RN  05/17/23 06:47 EDT
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
## 2023-05-18 ENCOUNTER — TELEPHONE (OUTPATIENT)
Dept: CARDIOLOGY | Facility: CLINIC | Age: 78
End: 2023-05-18
Payer: MEDICARE

## 2023-05-18 RX ORDER — METOPROLOL SUCCINATE 25 MG/1
25 TABLET, EXTENDED RELEASE ORAL EVERY 12 HOURS
Qty: 60 TABLET | Refills: 0 | Status: SHIPPED | OUTPATIENT
Start: 2023-05-18

## 2023-05-18 NOTE — CASE MANAGEMENT/SOCIAL WORK
Case Management Discharge Note      Final Note: Home, friend to transport home    Provided Post Acute Provider List?: N/A  Provided Post Acute Provider Quality & Resource List?: N/A    Selected Continued Care - Discharged on 5/17/2023 Admission date: 5/17/2023 - Discharge disposition: Home or Self Care    Destination    No services have been selected for the patient.              Durable Medical Equipment    No services have been selected for the patient.              Dialysis/Infusion    No services have been selected for the patient.              Home Medical Care    No services have been selected for the patient.              Therapy    No services have been selected for the patient.              Community Resources    No services have been selected for the patient.              Community & DME    No services have been selected for the patient.                  Transportation Services  Private: Car    Final Discharge Disposition Code: 01 - home or self-care

## 2023-05-18 NOTE — TELEPHONE ENCOUNTER
Caller: ED    Relationship: SELF    Best call back number:  [phone]    What is the best time to reach you: ANY        What was the call regarding: PT WOULD LIKE TO TALK ABOUT HIS APPOINTMENT ON 5/23/23, HE RECEIVED A LOT OF INFORMATION AND ISN'T SURE IF THIS IS THE SAME PROCEDURE THAT WAS DISCUSSED WHEN HE WAS ADMITTED.    PT ALSO HAS SOME CONCERNS ABOUT HIS MEDICATION DEPENDING ON WHAT HAPPENS IN THE FUTURE.  HE MAY BE SHORT ON SOME MEDICATIONS.    Do you require a callback: YES

## 2023-05-22 DIAGNOSIS — E03.2 HYPOTHYROIDISM DUE TO MEDICATION: ICD-10-CM

## 2023-05-22 DIAGNOSIS — I10 ESSENTIAL HYPERTENSION: Chronic | ICD-10-CM

## 2023-05-22 RX ORDER — LEVOTHYROXINE SODIUM 0.05 MG/1
50 TABLET ORAL DAILY
Qty: 90 TABLET | Refills: 3 | Status: SHIPPED | OUTPATIENT
Start: 2023-05-22

## 2023-05-22 RX ORDER — LOSARTAN POTASSIUM 25 MG/1
25 TABLET ORAL DAILY
Qty: 90 TABLET | Refills: 3 | Status: SHIPPED | OUTPATIENT
Start: 2023-05-22 | End: 2023-05-25 | Stop reason: HOSPADM

## 2023-05-23 ENCOUNTER — HOSPITAL ENCOUNTER (INPATIENT)
Facility: HOSPITAL | Age: 78
LOS: 2 days | Discharge: HOME | End: 2023-05-25
Attending: INTERNAL MEDICINE | Admitting: INTERNAL MEDICINE
Payer: MEDICARE

## 2023-05-23 DIAGNOSIS — I48.91 ATRIAL FIBRILLATION WITH RAPID VENTRICULAR RESPONSE: ICD-10-CM

## 2023-05-23 PROBLEM — I48.19 PERSISTENT ATRIAL FIBRILLATION: Status: ACTIVE | Noted: 2023-05-23

## 2023-05-23 LAB
ALBUMIN SERPL-MCNC: 4 G/DL (ref 3.5–5.2)
ALBUMIN/GLOB SERPL: 1.7 G/DL
ALP SERPL-CCNC: 76 U/L (ref 39–117)
ALT SERPL W P-5'-P-CCNC: 18 U/L (ref 1–41)
ANION GAP SERPL CALCULATED.3IONS-SCNC: 11.4 MMOL/L (ref 5–15)
AST SERPL-CCNC: 20 U/L (ref 1–40)
BASOPHILS # BLD AUTO: 0.06 10*3/MM3 (ref 0–0.2)
BASOPHILS NFR BLD AUTO: 0.6 % (ref 0–1.5)
BILIRUB SERPL-MCNC: 0.7 MG/DL (ref 0–1.2)
BUN SERPL-MCNC: 25 MG/DL (ref 8–23)
BUN/CREAT SERPL: 14 (ref 7–25)
CALCIUM SPEC-SCNC: 9.5 MG/DL (ref 8.6–10.5)
CHLORIDE SERPL-SCNC: 108 MMOL/L (ref 98–107)
CO2 SERPL-SCNC: 21.6 MMOL/L (ref 22–29)
CREAT SERPL-MCNC: 1.79 MG/DL (ref 0.76–1.27)
DEPRECATED RDW RBC AUTO: 44.8 FL (ref 37–54)
EGFRCR SERPLBLD CKD-EPI 2021: 38.8 ML/MIN/1.73
EOSINOPHIL # BLD AUTO: 1.03 10*3/MM3 (ref 0–0.4)
EOSINOPHIL NFR BLD AUTO: 10.3 % (ref 0.3–6.2)
ERYTHROCYTE [DISTWIDTH] IN BLOOD BY AUTOMATED COUNT: 13.3 % (ref 12.3–15.4)
GLOBULIN UR ELPH-MCNC: 2.3 GM/DL
GLUCOSE SERPL-MCNC: 104 MG/DL (ref 65–99)
HCT VFR BLD AUTO: 44.4 % (ref 37.5–51)
HGB BLD-MCNC: 14.9 G/DL (ref 13–17.7)
IMM GRANULOCYTES # BLD AUTO: 0.03 10*3/MM3 (ref 0–0.05)
IMM GRANULOCYTES NFR BLD AUTO: 0.3 % (ref 0–0.5)
LYMPHOCYTES # BLD AUTO: 2.14 10*3/MM3 (ref 0.7–3.1)
LYMPHOCYTES NFR BLD AUTO: 21.4 % (ref 19.6–45.3)
MAGNESIUM SERPL-MCNC: 1.9 MG/DL (ref 1.6–2.4)
MCH RBC QN AUTO: 31 PG (ref 26.6–33)
MCHC RBC AUTO-ENTMCNC: 33.6 G/DL (ref 31.5–35.7)
MCV RBC AUTO: 92.5 FL (ref 79–97)
MONOCYTES # BLD AUTO: 0.93 10*3/MM3 (ref 0.1–0.9)
MONOCYTES NFR BLD AUTO: 9.3 % (ref 5–12)
NEUTROPHILS NFR BLD AUTO: 5.83 10*3/MM3 (ref 1.7–7)
NEUTROPHILS NFR BLD AUTO: 58.1 % (ref 42.7–76)
NRBC BLD AUTO-RTO: 0 /100 WBC (ref 0–0.2)
PLATELET # BLD AUTO: 226 10*3/MM3 (ref 140–450)
PMV BLD AUTO: 10.9 FL (ref 6–12)
POTASSIUM SERPL-SCNC: 4.2 MMOL/L (ref 3.5–5.2)
PROT SERPL-MCNC: 6.3 G/DL (ref 6–8.5)
QT INTERVAL: 340 MS
RBC # BLD AUTO: 4.8 10*6/MM3 (ref 4.14–5.8)
SODIUM SERPL-SCNC: 141 MMOL/L (ref 136–145)
WBC NRBC COR # BLD: 10.02 10*3/MM3 (ref 3.4–10.8)

## 2023-05-23 PROCEDURE — 85025 COMPLETE CBC W/AUTO DIFF WBC: CPT | Performed by: NURSE PRACTITIONER

## 2023-05-23 PROCEDURE — 84156 ASSAY OF PROTEIN URINE: CPT | Performed by: INTERNAL MEDICINE

## 2023-05-23 PROCEDURE — 81003 URINALYSIS AUTO W/O SCOPE: CPT | Performed by: INTERNAL MEDICINE

## 2023-05-23 PROCEDURE — 83735 ASSAY OF MAGNESIUM: CPT | Performed by: NURSE PRACTITIONER

## 2023-05-23 PROCEDURE — 99223 1ST HOSP IP/OBS HIGH 75: CPT | Performed by: NURSE PRACTITIONER

## 2023-05-23 PROCEDURE — 82570 ASSAY OF URINE CREATININE: CPT | Performed by: INTERNAL MEDICINE

## 2023-05-23 PROCEDURE — 93010 ELECTROCARDIOGRAM REPORT: CPT | Performed by: INTERNAL MEDICINE

## 2023-05-23 PROCEDURE — 80053 COMPREHEN METABOLIC PANEL: CPT | Performed by: NURSE PRACTITIONER

## 2023-05-23 PROCEDURE — 93005 ELECTROCARDIOGRAM TRACING: CPT | Performed by: NURSE PRACTITIONER

## 2023-05-23 RX ORDER — ATORVASTATIN CALCIUM 20 MG/1
20 TABLET, FILM COATED ORAL NIGHTLY
Status: DISCONTINUED | OUTPATIENT
Start: 2023-05-23 | End: 2023-05-25 | Stop reason: HOSPADM

## 2023-05-23 RX ORDER — ASCORBIC ACID 500 MG
1000 TABLET ORAL DAILY
Status: DISCONTINUED | OUTPATIENT
Start: 2023-05-23 | End: 2023-05-25 | Stop reason: HOSPADM

## 2023-05-23 RX ORDER — LOSARTAN POTASSIUM 25 MG/1
25 TABLET ORAL DAILY
Status: DISCONTINUED | OUTPATIENT
Start: 2023-05-23 | End: 2023-05-23

## 2023-05-23 RX ORDER — MELATONIN
1000 DAILY
Status: DISCONTINUED | OUTPATIENT
Start: 2023-05-23 | End: 2023-05-25 | Stop reason: HOSPADM

## 2023-05-23 RX ORDER — NITROGLYCERIN 0.4 MG/1
0.4 TABLET SUBLINGUAL
Status: DISCONTINUED | OUTPATIENT
Start: 2023-05-23 | End: 2023-05-25 | Stop reason: HOSPADM

## 2023-05-23 RX ORDER — SOTALOL HYDROCHLORIDE 80 MG/1
120 TABLET ORAL EVERY 12 HOURS SCHEDULED
Status: DISCONTINUED | OUTPATIENT
Start: 2023-05-23 | End: 2023-05-25 | Stop reason: HOSPADM

## 2023-05-23 RX ORDER — LEVOTHYROXINE SODIUM 0.05 MG/1
50 TABLET ORAL
Status: DISCONTINUED | OUTPATIENT
Start: 2023-05-24 | End: 2023-05-25 | Stop reason: HOSPADM

## 2023-05-23 RX ADMIN — APIXABAN 5 MG: 5 TABLET, FILM COATED ORAL at 20:11

## 2023-05-23 RX ADMIN — SOTALOL HYDROCHLORIDE 120 MG: 80 TABLET ORAL at 18:35

## 2023-05-23 NOTE — PROGRESS NOTES
"[facility] Clinical Pharmacy Services: Sotalol Consult    Pharmacy has been consulted to look over Gary Grullon's profile to review renal function and drug interactions before starting sotalol inpatient per Angie MITCHELL's request.    Relevant clinical data and objective history reviewed:  77 y.o. male 188 cm (74\") 108 kg (238 lb)    Past Medical History:   Diagnosis Date    COVID-19 01/2022    Hyperlipidemia     Hypertension     NAION (non-arteritic anterior ischemic optic neuropathy), 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    HEDY (obstructive sleep apnea)     AHI 15/h supine 49/h     Creatinine   Date Value Ref Range Status   05/23/2023 1.79 (H) 0.76 - 1.27 mg/dL Final   05/17/2023 1.25 0.76 - 1.27 mg/dL Final   04/12/2023 1.44 (H) 0.76 - 1.27 mg/dL Final   01/03/2023 1.44 (H) 0.76 - 1.27 mg/dL Final     BUN   Date Value Ref Range Status   05/23/2023 25 (H) 8 - 23 mg/dL Final     Estimated Creatinine Clearance: 45.9 mL/min (A) (by C-G formula based on SCr of 1.79 mg/dL (H)).    Assessment/Plan    Based on patient's renal function, dosing guidelines and speaking with ordering provider, will start patient  on starting dose of sotalol 120 mg PO every 12hr     This dose may change after initial dosing depending on the EKG, or if the physician feels there is a reason for an alternative dose    Looking over the patient's home medication list, the following medications interact with sotalol:  No serious DDIs at this time    Thank you for allowing me to participate in your patient's care. Please call pharmacy with any questions or concerns.    Keira Harmon, [facility], located within [facility]  Clinical Pharmacist      "

## 2023-05-23 NOTE — CASE MANAGEMENT/SOCIAL WORK
Discharge Planning Assessment  [facility]     Patient Name: Gary Grullon  MRN: [id number]  Today's Date: 5/23/2023    Admit Date: 5/23/2023    Plan: Home with family, spouse will transport home   Discharge Needs Assessment     Row Name 05/23/23 1603       Living Environment    People in Home spouse    Name(s) of People in Home Thu Grullon [phone]    Current Living Arrangements home    Potentially Unsafe Housing Conditions none    Primary Care Provided by self    Provides Primary Care For no one    Family Caregiver if Needed spouse    Family Caregiver Names Thu Grullon    Quality of Family Relationships helpful;involved       Resource/Environmental Concerns    Resource/Environmental Concerns none    Transportation Concerns none       Food Insecurity    Within the past 12 months, you worried that your food would run out before you got the money to buy more. Never true    Within the past 12 months, the food you bought just didn't last and you didn't have money to get more. Never true       Transition Planning    Patient/Family Anticipates Transition to home;home with family    Patient/Family Anticipated Services at Transition none    Transportation Anticipated car, drives self;family or friend will provide       Discharge Needs Assessment    Equipment Currently Used at Home none    Concerns to be Addressed no discharge needs identified;denies needs/concerns at this time    Anticipated Changes Related to Illness none    Equipment Needed After Discharge none    Provided Post Acute Provider List? N/A    Provided Post Acute Provider Quality & Resource List? N/A               Discharge Plan     Row Name 05/23/23 1604       Plan    Plan Home with family, spouse will transport home    Patient/Family in Agreement with Plan yes    Plan Comments Met with pt at bedside. Explained role of . Face sheet verified, and pt PCP is Rommel Wilkerson. Pt denies any difficulty paying for medications, and pts  pharmacy is [facility]. Pt lives with his spouse, who can provide any care needs that may arise. Pt is independent in ADL's and denies the need for any assitive devices. Pt denies the need for any home health or rehab services. Pt continues to drive, upon discharge, his spouse will transport home. Explained that CCP would follow to assess for any discharge needs              Continued Care and Services - Admitted Since 5/23/2023    Coordination has not been started for this encounter.       Expected Discharge Date and Time     Expected Discharge Date Expected Discharge Time    May 26, 2023          Demographic Summary    No documentation.                Functional Status    No documentation.                Psychosocial    No documentation.                Abuse/Neglect    No documentation.                Legal    No documentation.                Substance Abuse    No documentation.                Patient Forms    No documentation.                   Cheyanne Stanley RN
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
## 2023-05-24 ENCOUNTER — APPOINTMENT (OUTPATIENT)
Dept: CARDIOLOGY | Facility: HOSPITAL | Age: 78
End: 2023-05-24
Payer: MEDICARE

## 2023-05-24 LAB
ANION GAP SERPL CALCULATED.3IONS-SCNC: 8.2 MMOL/L (ref 5–15)
BILIRUB UR QL STRIP: NEGATIVE
BUN SERPL-MCNC: 21 MG/DL (ref 8–23)
BUN/CREAT SERPL: 14.2 (ref 7–25)
CALCIUM SPEC-SCNC: 9.2 MG/DL (ref 8.6–10.5)
CHLORIDE SERPL-SCNC: 109 MMOL/L (ref 98–107)
CLARITY UR: CLEAR
CO2 SERPL-SCNC: 22.8 MMOL/L (ref 22–29)
COLOR UR: YELLOW
CREAT SERPL-MCNC: 1.48 MG/DL (ref 0.76–1.27)
CREAT UR-MCNC: 86.2 MG/DL
EGFRCR SERPLBLD CKD-EPI 2021: 48.7 ML/MIN/1.73
GLUCOSE SERPL-MCNC: 94 MG/DL (ref 65–99)
GLUCOSE UR STRIP-MCNC: NEGATIVE MG/DL
HGB UR QL STRIP.AUTO: NEGATIVE
KETONES UR QL STRIP: NEGATIVE
LEUKOCYTE ESTERASE UR QL STRIP.AUTO: NEGATIVE
MAGNESIUM SERPL-MCNC: 2.1 MG/DL (ref 1.6–2.4)
NITRITE UR QL STRIP: NEGATIVE
PH UR STRIP.AUTO: 5.5 [PH] (ref 5–8)
POTASSIUM SERPL-SCNC: 4.1 MMOL/L (ref 3.5–5.2)
PROT ?TM UR-MCNC: 4.2 MG/DL
PROT UR QL STRIP: NEGATIVE
PROT/CREAT UR: 48.7 MG/G CREA (ref 0–200)
QT INTERVAL: 347 MS
QT INTERVAL: 367 MS
QT INTERVAL: 441 MS
SODIUM SERPL-SCNC: 140 MMOL/L (ref 136–145)
SP GR UR STRIP: 1.01 (ref 1–1.03)
UROBILINOGEN UR QL STRIP: NORMAL

## 2023-05-24 PROCEDURE — 93010 ELECTROCARDIOGRAM REPORT: CPT | Performed by: INTERNAL MEDICINE

## 2023-05-24 PROCEDURE — 83735 ASSAY OF MAGNESIUM: CPT | Performed by: NURSE PRACTITIONER

## 2023-05-24 PROCEDURE — 93005 ELECTROCARDIOGRAM TRACING: CPT | Performed by: INTERNAL MEDICINE

## 2023-05-24 PROCEDURE — 99152 MOD SED SAME PHYS/QHP 5/>YRS: CPT

## 2023-05-24 PROCEDURE — 93005 ELECTROCARDIOGRAM TRACING: CPT | Performed by: NURSE PRACTITIONER

## 2023-05-24 PROCEDURE — 92960 CARDIOVERSION ELECTRIC EXT: CPT | Performed by: INTERNAL MEDICINE

## 2023-05-24 PROCEDURE — 92960 CARDIOVERSION ELECTRIC EXT: CPT

## 2023-05-24 PROCEDURE — 80048 BASIC METABOLIC PNL TOTAL CA: CPT | Performed by: NURSE PRACTITIONER

## 2023-05-24 RX ORDER — SODIUM CHLORIDE 9 MG/ML
INJECTION, SOLUTION INTRAVENOUS
Status: COMPLETED | OUTPATIENT
Start: 2023-05-24 | End: 2023-05-24

## 2023-05-24 RX ADMIN — SOTALOL HYDROCHLORIDE 120 MG: 80 TABLET ORAL at 19:29

## 2023-05-24 RX ADMIN — Medication 1000 UNITS: at 08:22

## 2023-05-24 RX ADMIN — SOTALOL HYDROCHLORIDE 120 MG: 80 TABLET ORAL at 06:29

## 2023-05-24 RX ADMIN — ATORVASTATIN CALCIUM 20 MG: 20 TABLET, FILM COATED ORAL at 19:29

## 2023-05-24 RX ADMIN — Medication 70 MG: at 16:38

## 2023-05-24 RX ADMIN — LEVOTHYROXINE SODIUM 50 MCG: 50 TABLET ORAL at 06:29

## 2023-05-24 RX ADMIN — APIXABAN 5 MG: 5 TABLET, FILM COATED ORAL at 19:29

## 2023-05-24 RX ADMIN — OXYCODONE HYDROCHLORIDE AND ACETAMINOPHEN 1000 MG: 500 TABLET ORAL at 08:22

## 2023-05-24 RX ADMIN — SODIUM CHLORIDE 50 ML/HR: 9 INJECTION, SOLUTION INTRAVENOUS at 16:29

## 2023-05-24 RX ADMIN — APIXABAN 5 MG: 5 TABLET, FILM COATED ORAL at 08:22

## 2023-05-24 NOTE — NURSING NOTE
Patient transferred off unit at this time for cardioversion. Tele removed and left in room. Consent signed and on chart. Wife is going to remain in room .

## 2023-05-24 NOTE — PLAN OF CARE
Goal Outcome Evaluation:  Plan of Care Reviewed With: patient, spouse        Progress: improving  Outcome Evaluation: Patient has been alert and oriented with no complaints of pain. Up ad jose in room. Patient had successful cardioversion this afternoon. Tolerated well and returned to unit in no distress with vital signs stable. Patient is SR/SB on monitor. Hopeful for discharge tomorrow. Vital signs stable. Call light in reach. Safety maintained.

## 2023-05-24 NOTE — CONSULTS
[facility] Consult Note      Patient Name: Gary Grullon  : 1945  MRN: [id number]  Primary Care Physician:  Rommel Wilkerson MD  Referring Physician: Nolan Zamudio MD  Date of admission: 2023    Subjective     Reason for Consult:  Abnormal renal function    HPI:   Gary Grullon is a 77 y.o. male with a past medical history of hypertension, obstructive sleep apnea on nocturnal CPAP, dyslipidemia, chronic atrial fibrillation well-known to cardiology with many antiarrhythmics regimens patient underwent cardioversion 18 months ago recently after a prolonged drive from [place] started to notice palpitations found to have recurrence of his A-fib.  That require admission and further management  With subsequent abnormal renal function     Patient is awaiting to have cardioversion tomorrow .    Nephrology consultation of been requested for the management      Review of Systems:   14 point review of systems is otherwise negative except for mentioned above on HPI    Personal History     Past Medical History:   Diagnosis Date   • COVID-19 2022   • Hyperlipidemia    • Hypertension    • NAION (non-arteritic anterior ischemic optic neuropathy), 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   • HEDY (obstructive sleep apnea)     AHI 15/h supine 49/h       Past Surgical History:   Procedure Laterality Date   • CATARACT EXTRACTION     • COLONOSCOPY  2018    DR Gary Paredes [facility]   • CYST REMOVAL     • PILONIDAL CYST DRAINAGE     • TEMPORAL ARTERY BIOPSY Bilateral 10/11/2022    Procedure: BILATERAL TEMPORAL ARTERY BIOPSY;  Surgeon: José Antonio Ramirez MD;  Location: [facility];  Service: Ophthalmology;  Laterality: Bilateral;       Family History: family history includes COPD in his mother; Coronary artery disease in his father; Diabetes in his brother and father; Hypertension in his father; Kidney disease in his sister; No Known Problems in his maternal aunt, maternal  grandfather, maternal grandmother, maternal uncle, paternal aunt, paternal grandfather, paternal grandmother, and paternal uncle; Stroke in his father.    Social History:  reports that he has never smoked. He has never used smokeless tobacco. He reports current alcohol use of about 14.0 standard drinks per week. He reports that he does not use drugs.    Home Medications:  Prior to Admission medications    Medication Sig Start Date End Date Taking? Authorizing Provider   atorvastatin (LIPITOR) 20 MG tablet TAKE 1 TABLET DAILY  Patient taking differently: Take 1 tablet by mouth Daily. 8/24/22  Yes Rommel Wilkerson MD   cholecalciferol (VITAMIN D3) 1000 units tablet Take 1 tablet by mouth Daily. HOLDING FOR DOS   Yes Demetrio Darling MD   Eliquis 5 MG tablet tablet TAKE 1 TABLET EVERY 12 HOURS  Patient taking differently: Take 1 tablet by mouth Every 12 (Twelve) Hours. HOLDING FOR DOS    LAST DOSE 10-9-22 8/24/22  Yes Rommel Wilkerson MD   levothyroxine (Synthroid) 50 MCG tablet Take 1 tablet by mouth Daily. 5/22/23  Yes Rommel Wilkerson MD   losartan (Cozaar) 25 MG tablet Take 1 tablet by mouth Daily. 5/22/23  Yes Rommel Wilkerson MD   metoprolol succinate XL (TOPROL-XL) 25 MG 24 hr tablet Take 1 tablet by mouth Every 12 (Twelve) Hours. 5/18/23  Yes Angie Duncan APRN   vitamin C (ASCORBIC ACID) 500 MG tablet Take 2 tablets by mouth Daily. HOLDING FOR DOS   Yes Demetrio Darling MD       Allergies:  No Known Allergies    Objective     Vitals:   Temp:  [98.5 °F (36.9 °C)] 98.5 °F (36.9 °C)  Heart Rate:  [] 116  Resp:  [16] 16  BP: (104-122)/(64-72) 112/65  No intake or output data in the 24 hours ending 05/23/23 2025    Physical Exam:   Constitutional: Awake, alert, no acute distress.  HEENT: Sclera anicteric, no conjunctival injection  Neck: Supple, no thyromegaly, no lymphadenopathy, trachea at midline, no JVD  Respiratory: Clear to auscultation bilaterally, nonlabored  respiration  Cardiovascular: Irregular heart rate and rhythm  Gastrointestinal: Positive bowel sounds, abdomen is soft, nontender and nondistended  : No palpable bladder  Musculoskeletal: No edema, no clubbing or cyanosis  Psychiatric: Appropriate affect, cooperative  Neurologic: Oriented x3, moving all extremities, normal speech and mental status  Skin: Warm and dry       Scheduled Meds:     apixaban, 5 mg, Oral, Q12H  vitamin C, 1,000 mg, Oral, Daily  atorvastatin, 20 mg, Oral, Nightly  cholecalciferol, 1,000 Units, Oral, Daily  [START ON 5/24/2023] levothyroxine, 50 mcg, Oral, Q AM  losartan, 25 mg, Oral, Daily  sotalol, 120 mg, Oral, Q12H      IV Meds:        Results Reviewed:   I have personally reviewed the results from the time of this admission to 5/23/2023 20:25 EDT     Lab Results   Component Value Date    GLUCOSE 104 (H) 05/23/2023    CALCIUM 9.5 05/23/2023     05/23/2023    K 4.2 05/23/2023    CO2 21.6 (L) 05/23/2023     (H) 05/23/2023    BUN 25 (H) 05/23/2023    CREATININE 1.79 (H) 05/23/2023    EGFRIFAFRI 64 12/13/2021    EGFRIFNONA 51 (L) 02/16/2022    BCR 14.0 05/23/2023    ANIONGAP 11.4 05/23/2023      Lab Results   Component Value Date    MG 1.9 05/23/2023    ALBUMIN 4.0 05/23/2023         Lab Results   Lab Value Date/Time    CREATININE 1.79 (H) 05/23/2023 1444    CREATININE 1.25 05/17/2023 0203    CREATININE 1.44 (H) 04/12/2023 1121    CREATININE 1.44 (H) 01/03/2023 1037    CREATININE 1.52 (H) 09/29/2022 1111    CREATININE 1.46 (H) 08/23/2022 1045    CREATININE 1.74 (H) 08/04/2022 1135    CREATININE 1.93 (H) 07/22/2022 0920    CREATININE 1.58 (H) 05/17/2022 1119    CREATININE 1.45 (H) 03/17/2022 0822    CREATININE 1.37 (H) 02/16/2022 0623    CREATININE 1.21 02/01/2022 0622    CREATININE 1.30 (H) 12/20/2021 1055    CREATININE 1.26 12/13/2021 0859    CREATININE 1.32 (H) 05/28/2021 1047    CREATININE 1.36 (H) 11/30/2020 0828    CREATININE 1.24 06/16/2020 0835    CREATININE 1.18  03/05/2020 0850    CREATININE 1.07 01/10/2020 1117    CREATININE 1.22 12/13/2019 1838    CREATININE 1.10 09/10/2019 0913       Assessment / Plan       Persistent atrial fibrillation      ASSESSMENT:    -Nonoliguric chronic kidney disease stage IIIa baseline creatinine between 1 and 1.2.  Upon admission creatinine 1.2 that doris to 1.7, likely secondary to cardiorenal syndrome from A-fib RVR .  Previous urinalysis bland , will repeat during admission.  Renal ultrasound 8/22 right kidney measuring 11.8 x 5.3 x 4.5 cm.  Left kidney measuring 11.6 and 5.1 x 5.3 cm no hydronephrosis.  We will keep MAP over 65 and avoid nephrotoxins.  -Hypertension with chronic kidney disease currently on losartan 25 mg daily.  On heart healthy diet following closely  -Chronic atrial fibrillation currently on sotalol twice a day and closely monitor by cardiology on chronic anticoagulation on apixaban.  Status post cardioversion 18 months ago.  Patient awaiting cardioversion tomorrow by cardiology .  Patient previously was on metoprolo twice a day.  Patient has also tried amiodarone in the past but was discontinued due to side effects  -Vitamin D deficiency and vitamin D replacement  -Dyslipidemia on atorvastatin  -Hypothyroidism on thyroid replacement therapy      PLAN:  -I agree with the rate controlled treatment that I will allow better renal perfusion  -Will hold losartan due to borderline hypotension from A-fib  to allow renal recovery, can resume during his admission  -We will continue surveillance labs    Thank you for involving us in the care of Gary Grullon.  Please feel free to call with any questions.    Bebeto Ribera MD  05/23/23  20:25 EDT    [facility]  [phone]      Please note that portions of this note were completed with a voice recognition program.

## 2023-05-24 NOTE — PROGRESS NOTES
[facility] Progress Note      Patient Name: Gary Grullon  : 1945  MRN: [id number]  Primary Care Physician:  Rommel Wilkerson MD  Date of admission: 2023    Subjective     Interval History:   No event overnight  Patient lying in bed comfortable  Continues to be closely monitored on telemetry  Persistent A-fib up to 120s overnight    Denies any chest pain, shortness of breath palpitations    Currently n.p.o.    Awaiting cardioversion by cardiology today    Review of Systems:   As noted above    Objective     Vitals:   Temp:  [97.5 °F (36.4 °C)-98.5 °F (36.9 °C)] 97.5 °F (36.4 °C)  Heart Rate:  [] 104  Resp:  [16-20] 20  BP: (104-122)/(64-88) 104/85  No intake or output data in the 24 hours ending 23 0823    Physical Exam:      Constitutional: Awake, alert, no acute distress.  HEENT: Sclera anicteric, no conjunctival injection  Neck: Supple, no thyromegaly, no lymphadenopathy, trachea at midline, no JVD  Respiratory: Clear to auscultation bilaterally, nonlabored respiration  Cardiovascular: Irregular heart rate and rhythm  Gastrointestinal: Positive bowel sounds, abdomen is soft, nontender and nondistended  : No palpable bladder  Musculoskeletal: No edema, no clubbing or cyanosis  Psychiatric: Appropriate affect, cooperative  Neurologic: Oriented x3, moving all extremities, normal speech and mental status  Skin: Warm and dry      Scheduled Meds:     apixaban, 5 mg, Oral, Q12H  vitamin C, 1,000 mg, Oral, Daily  atorvastatin, 20 mg, Oral, Nightly  cholecalciferol, 1,000 Units, Oral, Daily  levothyroxine, 50 mcg, Oral, Q AM  sotalol, 120 mg, Oral, Q12H      IV Meds:        Results Reviewed:   I have personally reviewed the results from the time of this admission to 2023 08:23 EDT     Results from last 7 days   Lab Units 23  1444   SODIUM mmol/L 141   POTASSIUM mmol/L 4.2   CHLORIDE mmol/L 108*   CO2 mmol/L 21.6*   BUN mg/dL 25*   CREATININE mg/dL  1.79*   CALCIUM mg/dL 9.5   BILIRUBIN mg/dL 0.7   ALK PHOS U/L 76   ALT (SGPT) U/L 18   AST (SGOT) U/L 20   GLUCOSE mg/dL 104*       Estimated Creatinine Clearance: 45.9 mL/min (A) (by C-G formula based on SCr of 1.79 mg/dL (H)).    Results from last 7 days   Lab Units 05/23/23  1444   MAGNESIUM mg/dL 1.9             Results from last 7 days   Lab Units 05/23/23  1444   WBC 10*3/mm3 10.02   HEMOGLOBIN g/dL 14.9   PLATELETS 10*3/mm3 226             Assessment / Plan       ASSESSMENT:     -Nonoliguric chronic kidney disease stage IIIa baseline creatinine between 1 and 1.2.  Upon admission creatinine 1.2 that doris to 1.7, likely secondary to cardiorenal syndrome from A-fib RVR .  Previous urinalysis bland , will repeat during admission.  Renal ultrasound 8/22 right kidney measuring 11.8 x 5.3 x 4.5 cm.  Left kidney measuring 11.6 and 5.1 x 5.3 cm no hydronephrosis.  We will keep MAP over 65 and avoid nephrotoxins.  -Hypertension with chronic kidney disease currently on losartan 25 mg daily.  On heart healthy diet following closely  -Chronic atrial fibrillation currently on sotalol twice a day and closely monitor by cardiology on chronic anticoagulation on apixaban.  Status post cardioversion 18 months ago.  Patient awaiting cardioversion tomorrow by cardiology .  Patient previously was on metoprolo twice a day.  Patient has also tried amiodarone in the past but was discontinued due to side effects  -Vitamin D deficiency and vitamin D replacement  -Dyslipidemia on atorvastatin  -Hypothyroidism on thyroid replacement therapy        PLAN:  -Hope better heart rate control will improve renal function  -Currently n.p.o. and awaiting cardioversion this morning  -Will continue to  hold losartan due to borderline hypotension from A-fib   -We will continue surveillance labs       Thank you for involving us in the care of Gary SAGE Grullon.  Please feel free to call with any questions.    Bebeto Ribera MD  05/24/23  08:23  MADISYN    [facility]  [phone]    Please note that portions of this note were completed with a voice recognition program.

## 2023-05-25 ENCOUNTER — READMISSION MANAGEMENT (OUTPATIENT)
Dept: CALL CENTER | Facility: HOSPITAL | Age: 78
End: 2023-05-25
Payer: MEDICARE

## 2023-05-25 ENCOUNTER — TELEPHONE (OUTPATIENT)
Dept: INTERNAL MEDICINE | Facility: CLINIC | Age: 78
End: 2023-05-25
Payer: MEDICARE

## 2023-05-25 ENCOUNTER — TELEPHONE (OUTPATIENT)
Dept: INTERNAL MEDICINE | Facility: CLINIC | Age: 78
End: 2023-05-25

## 2023-05-25 VITALS
SYSTOLIC BLOOD PRESSURE: 99 MMHG | OXYGEN SATURATION: 97 % | HEART RATE: 56 BPM | BODY MASS INDEX: 30.54 KG/M2 | WEIGHT: 238 LBS | TEMPERATURE: 97.7 F | HEIGHT: 74 IN | DIASTOLIC BLOOD PRESSURE: 70 MMHG | RESPIRATION RATE: 18 BRPM

## 2023-05-25 LAB
ALBUMIN SERPL-MCNC: 3.4 G/DL (ref 3.5–5.2)
ANION GAP SERPL CALCULATED.3IONS-SCNC: 10.4 MMOL/L (ref 5–15)
BUN SERPL-MCNC: 23 MG/DL (ref 8–23)
BUN/CREAT SERPL: 16.7 (ref 7–25)
CALCIUM SPEC-SCNC: 9.4 MG/DL (ref 8.6–10.5)
CHLORIDE SERPL-SCNC: 106 MMOL/L (ref 98–107)
CO2 SERPL-SCNC: 21.6 MMOL/L (ref 22–29)
CREAT SERPL-MCNC: 1.38 MG/DL (ref 0.76–1.27)
EGFRCR SERPLBLD CKD-EPI 2021: 53 ML/MIN/1.73
GLUCOSE SERPL-MCNC: 89 MG/DL (ref 65–99)
MAGNESIUM SERPL-MCNC: 1.9 MG/DL (ref 1.6–2.4)
MAXIMAL PREDICTED HEART RATE: 144 BPM
PHOSPHATE SERPL-MCNC: 4.1 MG/DL (ref 2.5–4.5)
POTASSIUM SERPL-SCNC: 4.3 MMOL/L (ref 3.5–5.2)
QT INTERVAL: 441 MS
QT INTERVAL: 470 MS
SODIUM SERPL-SCNC: 138 MMOL/L (ref 136–145)
STRESS TARGET HR: 122 BPM

## 2023-05-25 PROCEDURE — 99238 HOSP IP/OBS DSCHRG MGMT 30/<: CPT | Performed by: NURSE PRACTITIONER

## 2023-05-25 PROCEDURE — 5A2204Z RESTORATION OF CARDIAC RHYTHM, SINGLE: ICD-10-PCS | Performed by: INTERNAL MEDICINE

## 2023-05-25 PROCEDURE — 93010 ELECTROCARDIOGRAM REPORT: CPT | Performed by: INTERNAL MEDICINE

## 2023-05-25 PROCEDURE — 80069 RENAL FUNCTION PANEL: CPT | Performed by: NURSE PRACTITIONER

## 2023-05-25 PROCEDURE — 93005 ELECTROCARDIOGRAM TRACING: CPT | Performed by: INTERNAL MEDICINE

## 2023-05-25 PROCEDURE — 83735 ASSAY OF MAGNESIUM: CPT | Performed by: NURSE PRACTITIONER

## 2023-05-25 RX ORDER — SOTALOL HYDROCHLORIDE 120 MG/1
120 TABLET ORAL EVERY 12 HOURS SCHEDULED
Qty: 60 TABLET | Refills: 5 | Status: SHIPPED | OUTPATIENT
Start: 2023-05-25

## 2023-05-25 RX ORDER — SOTALOL HYDROCHLORIDE 120 MG/1
120 TABLET ORAL EVERY 12 HOURS
Qty: 4 TABLET | Refills: 0 | Status: SHIPPED | OUTPATIENT
Start: 2023-05-25

## 2023-05-25 RX ADMIN — SOTALOL HYDROCHLORIDE 120 MG: 80 TABLET ORAL at 07:12

## 2023-05-25 RX ADMIN — APIXABAN 5 MG: 5 TABLET, FILM COATED ORAL at 08:07

## 2023-05-25 RX ADMIN — Medication 1000 UNITS: at 08:07

## 2023-05-25 RX ADMIN — LEVOTHYROXINE SODIUM 50 MCG: 50 TABLET ORAL at 07:12

## 2023-05-25 RX ADMIN — OXYCODONE HYDROCHLORIDE AND ACETAMINOPHEN 1000 MG: 500 TABLET ORAL at 08:07

## 2023-05-25 NOTE — PLAN OF CARE
Goal Outcome Evaluation:  Plan of Care Reviewed With: patient        Progress: no change  Outcome Evaluation: Patient has been alert and oriented. No complaints of pain or discomfort. Patient up ad jose. Has remained in normal sinus rhythm. Tolerating Sotalol well. New order for discharge, pending clearance from renal and obtaining rx. No distress. Vital signs stable, call light in reach. Safety maintained.
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
## 2023-05-25 NOTE — CASE MANAGEMENT/SOCIAL WORK
Continued Stay Note  [facility]     Patient Name: Gary Grullon  MRN: [id number]  Today's Date: 5/25/2023    Admit Date: 5/23/2023    Plan: DC home, family will transport   Discharge Plan     Row Name 05/25/23 1023       Plan    Plan DC home, family will transport    Plan Comments Contacted pt pharmacy, [facility]/[address], spoke to pharmicist to confirm that they have Sotalol in stock. [facility] does not have that dose in stock. [facility] will order and will have available on 5-. Contacted floor pharmacist, who stated that MD can order 4 doses of medication and pt may pay out of pocket to cover until medication in at [facility]. Spoke with pt, who is agreeable. Contacted [facility]/Dr. Servin. Orders sent to [facility] pharmacy.               Discharge Codes    No documentation.               Expected Discharge Date and Time     Expected Discharge Date Expected Discharge Time    May 25, 2023             Cheyanne Stanley, RN

## 2023-05-25 NOTE — OUTREACH NOTE
Prep Survey    Flowsheet Row Responses   [facility] patient discharged from? [facility]   Is LACE score < 7 ? No   Eligibility [facility]   Date of Admission 05/23/23   Date of Discharge 05/25/23   Discharge Disposition Home or Self Care   Discharge diagnosis Persistent atrial fibrillation   Does the patient have one of the following disease processes/diagnoses(primary or secondary)? Other   Does the patient have Home health ordered? No   Is there a DME ordered? No   Prep survey completed? Yes          Rehana [facility] - Registered Nurse

## 2023-05-25 NOTE — NURSING NOTE
Discharge instructions complete. IV removed, tele removed. Verbalized understanding of discharge instructions. Patient is picking up Sotalol at outpatient pharmacy. No distress at time of discharge.

## 2023-05-25 NOTE — DISCHARGE INSTR - APPOINTMENTS
Follow up with [facility]"" ([facility])  June 9, 2023 at 9:30a  [facility], in the [place] location.  [phone]

## 2023-05-25 NOTE — NURSING NOTE
Pt remains sinus. QTc normal after last dose. Mostly bradycardic throughout the night as low as 46. Does get up in the 60s while awake.

## 2023-05-25 NOTE — TELEPHONE ENCOUNTER
No available appointments for a hospital follow up with Dr. Wilkerson, patient is needing a follow up 5-7 days from now, please advise?  [phone]

## 2023-05-25 NOTE — CASE MANAGEMENT/SOCIAL WORK
Case Management Discharge Note      Final Note: Home, family to transport. Sotalol doses to be picked up by pt at [facility] pharmacy    Provided Post Acute Provider List?: N/A  Provided Post Acute Provider Quality & Resource List?: N/A    Selected Continued Care - Discharged on 5/25/2023 Admission date: 5/23/2023 - Discharge disposition: Home or Self Care    Destination    No services have been selected for the patient.              Durable Medical Equipment    No services have been selected for the patient.              Dialysis/Infusion    No services have been selected for the patient.              Home Medical Care    No services have been selected for the patient.              Therapy    No services have been selected for the patient.              Community Resources    No services have been selected for the patient.              Community & DME    No services have been selected for the patient.                  Transportation Services  Private: Car    Final Discharge Disposition Code: 01 - home or self-care

## 2023-05-25 NOTE — TELEPHONE ENCOUNTER
"  Caller: Gary Grullon W \"Ed\"    Relationship: Self    Best call back number: [phone]     What was the call regarding: PATIENT CALLED AND STATED THAT HE WAS IN THE HOSPITAL FOR FIVE OF THE LAST 8 DAYS. PATIENT STATES THAT LAST WEEK HE WAS IN THE HOSPITAL FOR TWO DAYS, AND THIS WEEK HE WAS IN THE HOSPITAL FOR THREE. HE WAS RELEASED TODAY. PATIENT WAS RELEASED FROM THE HOSPITAL LESS THAN AN HOUR AGO. PATIENT STATES THAT WHILE HE WAS IN THE HOSPITAL HE RECEIVED AROUND 10 BLOOD TESTS. PATIENT STATES THAT HE HAS AN APPOINTMENT FOR TOMORROW AT THE OFFICE TO GET BLOOD WORK DONE. PATIENT STATES THAT HE DOES NOT FEEL LIKE HE NEEDS TO HAVE THIS DONE, SINCE HE JUST HAD ALL OF THOSE OTHER TESTS DONE. PLEASE ADVISE PATIENT IF HE NEEDS TO HAVE THIS COMPLETED, AND ALSO IF HE NEEDS TO HAVE THE APPOINTMENT TO BE SEEN BY DR. LOZA.     Do you require a callback: YES          "

## 2023-05-26 ENCOUNTER — CLINICAL SUPPORT (OUTPATIENT)
Dept: CARDIOLOGY | Facility: CLINIC | Age: 78
End: 2023-05-26
Payer: MEDICARE

## 2023-05-26 ENCOUNTER — TRANSITIONAL CARE MANAGEMENT TELEPHONE ENCOUNTER (OUTPATIENT)
Dept: CALL CENTER | Facility: HOSPITAL | Age: 78
End: 2023-05-26
Payer: MEDICARE

## 2023-05-26 DIAGNOSIS — I48.19 ATRIAL FIBRILLATION, PERSISTENT: Primary | ICD-10-CM

## 2023-05-26 PROCEDURE — 93000 ELECTROCARDIOGRAM COMPLETE: CPT | Performed by: NURSE PRACTITIONER

## 2023-05-26 RX ORDER — LOSARTAN POTASSIUM 25 MG/1
1 TABLET ORAL EVERY 24 HOURS
COMMUNITY

## 2023-05-26 NOTE — PROGRESS NOTES
Procedure   Procedures      [facility] Cardiology Consult - Resident Note    Primary Attending Physician: Dr. Gonzáles  Primary Team: [facility] Family Medicine  Consultant Attending: Dr. Clemons  Consultant Resident: Dr. Yeh    Reason for Consult:     Evaluation and Management of New Onset Heart Failure    Subjective:      History of Present Illness:  Ming Boateng is a 62 y.o. male who  has a past medical history of Diabetes mellitus, HLD, and Hypertension.. The patient presented to the [facility] on 4/29/2017 with a primary complaint of Shortness of Breath x 1 day.     Patient was in his usual state of health until 2 days prior to admission when he woke up and noticed pain in his chest.  He said that the pain was 8/10, left sided, constant, stabbing pain which did not radiate.  He states that the pain was continuous x 2 days and then went away on its own.  He denies taking any medication to help alleviate the chest pain.  After the chest pain went away on the morning of admission, patient started to become SOB.  He states that he continued to become more short of breath at rest and on exertion as the day progressed.  He also experienced a non-productive cough and increased leg swelling which continued to get worse.  At that time, he decided to go to [facility] ED for further evaluation.      Pt states that he uses 2 pillows to sleep at night.  Complains of PND and diaphoresis.  Denies any fevers, chills, N/V, jaw pain, back pain, shoulder pain, palpitations, abdominal pain, diarrhea, dysuria, or rashes.        Upon admission to the [facility] Family Medicine Service, pt has been treated for a Heart Failure exacerbation given his clinical presentation and elevated BNP and CXR findings of cardiomegaly and increased pulmonary vascular congestion.  Pt has been diuresing well with Lasix 60 mg IV BID and he is about 2.5 L net negative since admission.       Past Medical History:  Past Medical History:   Diagnosis Date    Diabetes mellitus      Hypertension    HLD    Past Surgical History:  Past Surgical History:   Procedure Laterality Date    PANCREATECTOMY         Allergies:  Review of patient's allergies indicates:   Allergen Reactions    Cayenne pepper        Medications:   In-Hospital Scheduled Medications:   amlodipine  10 mg Oral Daily    ceFEPime (MAXIPIME) IVPB  2 g Intravenous Q12H    enoxaparin  40 mg Subcutaneous Daily    famotidine (PF)  20 mg Intravenous BID    insulin detemir  10 Units Subcutaneous QHS    pravastatin  20 mg Oral Daily    vancomycin 1 g in 0.9% sodium chloride 250 mL IVPB (ready to mix system)  1 g Intravenous Q24H      In-Hospital PRN Medications:  dextrose 50%, dextrose 50%, glucagon (human recombinant), glucose, glucose, insulin aspart, morphine, nitroGLYCERIN   In-Hospital IV Infusion Medications:      Home Medications:  Prior to Admission medications    Medication Sig Start Date End Date Taking? Authorizing Provider   aspirin 81 MG Chew Take 81 mg by mouth once daily.   Yes Historical Provider, MD   carvedilol (COREG) 6.25 MG tablet Take 6.25 mg by mouth 2 (two) times daily with meals.   Yes Historical Provider, MD   cetirizine 10 mg chewable tablet Take 10 mg by mouth once daily.   Yes Historical Provider, MD   garlic 1,000 mg Cap Take by mouth.   Yes Historical Provider, MD   insulin aspart (NOVOLOG) 100 unit/mL injection Inject into the skin continuous. [employer] insulin pump-Dr. Blount manages at [facility]   Yes Historical Provider, MD   losartan-hydrochlorothiazide 50-12.5 mg (HYZAAR) 50-12.5 mg per tablet Take 1 tablet by mouth once daily.   Yes Historical Provider, MD   pravastatin (PRAVACHOL) 20 MG tablet Take 20 mg by mouth once daily.   Yes Historical Provider, MD   sertraline (ZOLOFT) 25 MG tablet Take 25 mg by mouth once daily.   Yes Historical Provider, MD   amlodipine (NORVASC) 10 MG tablet Take 1 tablet (10 mg total) by mouth once daily. 6/18/16 6/18/17  Terence Nazario MD   olmesartan  (BENICAR) 40 MG tablet Take 40 mg by mouth once daily.    Historical Provider, MD       Family History:  History reviewed. No pertinent family history.    Social History:  Social History   Substance Use Topics    Smoking status: Never Smoker    Smokeless tobacco: None    Alcohol use No       Review of Systems:  See positive ROS stated in HPI.  All other systems are reviewed and are negative.     Objective:   Last 24 Hour Vital Signs:  BP  Min: 140/63  Max: 191/81  Temp  Av.9 °F (37.2 °C)  Min: 97.7 °F (36.5 °C)  Max: 99.6 °F (37.6 °C)  Pulse  Av  Min: 74  Max: 95  Resp  Av.4  Min: 18  Max: 22  SpO2  Av.3 %  Min: 88 %  Max: 97 %  Weight  Av.5 kg (188 lb 7.9 oz)  Min: 85.5 kg (188 lb 7.9 oz)  Max: 85.5 kg (188 lb 7.9 oz)  I/O last 3 completed shifts:  In: 1180 [P.O.:1030; IV Piggyback:150]  Out: 2550 [Urine:2550]    Physical Examination:  General: resting comfortably; mild respiratory distress  HEENT: NC/AT, PERRL, EOMI, oropharynx clear, nose clear, nasal cannula in place  Neck: JVP @ 8 cm, neck supple, no LAD  CVS: RRR, no murmurs  Resp: decreased BS at B/L bases with bibasilar crackles; no wheezes; no increased work of breathing  Abd: + BS, soft, NT, ND  Ext: 1+ pedal edema to ankles, no cyanosis; distal pulses 2+ and symmetric; CR <2 sec  Skin: chronic venous changes to B/L LE; bruises on B/L LE  Neuro: AAO x 3, no focal deficits     Laboratory Results:  Most Recent Data:  CBC: Lab Results   Component Value Date    WBC 8.67 2017    HGB 8.1 (L) 2017    HCT 23.7 (L) 2017     2017    MCV 76 (L) 2017    RDW 14.0 2017     BMP: Lab Results   Component Value Date     (L) 2017    K 4.4 2017     2017    CO2 18 (L) 2017    BUN 42 (H) 2017     (H) 2017    CALCIUM 8.7 2017     LFTs: Lab Results   Component Value Date    PROT 7.0 2017    ALBUMIN 2.8 (L) 2017    BILITOT 0.5 2017     AST 10 05/01/2017    ALKPHOS 70 05/01/2017    ALT 7 (L) 05/01/2017     Coags:   Lab Results   Component Value Date    INR 1.1 04/29/2017     DM: Lab Results   Component Value Date    HGBA1C 7.6 (H) 04/29/2017    HGBA1C 10.3 (H) 06/17/2016    CREATININE 2.4 (H) 05/01/2017     Anemia: Lab Results   Component Value Date    IRON 49 06/18/2016    TIBC 305 06/18/2016    FERRITIN 194 06/18/2016    WLHMAFRU34 530 06/18/2016    FOLATE 13.3 06/18/2016     Cardiac: Lab Results   Component Value Date    TROPONINI 0.024 04/30/2017     Urinalysis: Lab Results   Component Value Date    LABURIN No significant growth 06/17/2016    COLORU Yellow 04/29/2017    SPECGRAV 1.020 04/29/2017    NITRITE Negative 04/29/2017    KETONESU Negative 04/29/2017    UROBILINOGEN Negative 04/29/2017       Trended Lab Data:    Recent Labs  Lab 04/29/17  0951 04/29/17  1042 04/30/17  0530 05/01/17  0424   WBC 13.03*  --  9.21 8.67   HGB 10.5*  --  9.0* 8.1*   HCT 30.4* 27* 26.0* 23.7*     --  295 311   MCV 77*  --  76* 76*   RDW 13.9  --  14.0 14.0     --  135* 134*   K 4.0  --  3.8 4.4     --  107 104   CO2 21*  --  19* 18*   BUN 40*  --  43* 42*   *  --  48* 199*   PROT 7.5  --  7.0 7.0   ALBUMIN 4.0  --  3.0* 2.8*   BILITOT 0.6  --  0.5 0.5   AST 18  --  14 10   ALKPHOS 91  --  70 70   ALT 23  --  9* 7*       Trended Cardiac Data:    Recent Labs  Lab 04/29/17  1732 04/29/17  2257 04/30/17  1158   TROPONINI 0.030* 0.027* 0.024       Microbiology Data  Microbiology Results (last 7 days)     Procedure Component Value Units Date/Time    Blood culture [[id number]] Collected:  04/29/17 1243    Order Status:  Completed Specimen:  Blood from Peripheral, Antecubital, Right Updated:  04/30/17 2012     Blood Culture, Routine No Growth to date     Blood Culture, Routine No Growth to date    Blood culture [[id number]] Collected:  04/29/17 1026    Order Status:  Completed Specimen:  Blood from Peripheral, Antecubital, Right Updated:   04/30/17 2012     Blood Culture, Routine No Growth to date     Blood Culture, Routine No Growth to date    Gram stain [[id number]] Collected:  04/29/17 1809    Order Status:  Completed Specimen:  Urine from Urine, Clean Catch Updated:  04/30/17 0425     Gram Stain Result No WBC's      No organisms seen            Other Results:  EKG (my interpretation): NSR; no evidence of acute ischemia    Radiology:  X-ray Chest Pa And Lateral    Result Date: 4/30/2017  Comparison: 4/29/17 Technique: Chest PA and lateral. Findings: The cardiac silhouette is enlarged.  There is prominent central pulmonary vascular congestion with increasing pleural fluid right greater than left.  Superimposed airspace consolidation particularly at the right lung base is not excluded.  No evidence of pneumothorax.  Bones demonstrate degenerative changes.    Findings primarily concerning for worsening pulmonary vascular congestion/edema with increasing pleural fluid right greater than left.  Superimposed airspace consolidation/ pneumonia at the right lung base not excluded. Electronically signed by: LADARIUS MATHEW MD Date:     04/30/17 Time:    14:26     X-ray Chest Ap Portable    Result Date: 4/29/2017  EXAM:   XR CHEST AP PORTABLE CLINICAL HISTORY:  CHF  COMPARISON:  None FINDINGS:   Heart size is enlarged.  Central pulmonary vascular congestion.  Indication of some interstitial pulmonary edema.  Probable small bilateral pleural effusions.  No large effusions are present.  No pneumothorax.  Aortic calcifications.        Cardiomegaly.  Vascular congestion. Electronically signed by: KIM ISAACS MD Date:     04/29/17 Time:    10:34      Assessment:     Ming Boateng is a 62 y.o. male with:  Patient Active Problem List    Diagnosis Date Noted    Pulmonary edema with congestive heart failure 04/29/2017    Hyperglycemia 06/17/2016    ALBERTO (acute kidney injury) 06/17/2016    Essential hypertension 06/17/2016    Microcytic anemia 06/17/2016    HLD  (hyperlipidemia) 06/17/2016        Plan:     Newly Diagnosed HFpEF   - presented with chest pain, SOB, PND, HICKS, and dry cough x 3 days prior to admission  - exam with bibasilar crackles and JVP @ 8 cm; reported leg swelling on admission which has since resolved after 2 days of diuresis  - BNP 1590 on presentation  - Troponins 0.03 --> 0.027 --> 0.024; EKGs x 3 with no evidence of acute ischemia  - CXR with cardiolmegaly, pulmonary vascular congestion, and B/L pleural effusions R>L   - Echo with normal EF and diastolic dysfunction  - currently being diuresed with Lasix 60 mg IV BID and is net negative 2185 mL since admit  - recommend increase Lasix to 80 mg IV BID   - daily weights  - low Na diet  - fluid restrict 1500 mL daily  - continue to monitor strict I/Os  - recommend that patient undergo an ischemic work-up is he continues to have persistent chest pain    HTN  - BPs have been elevated at 140s-190s/60-80s  - recommend strict BP control as best as patient tolerates in the setting of heart failure  - continue Amlodipine 10 mg PO daily   - recommend restarting home Coreg and increasing the dose to 12.5 mg PO BID; continue to increase Coreg as pt tolerates to maximize BP control      HLD  - recommend fasting Lipid panel  - continue Pravastatin    ALBERTO  - BUN/Cr 42/2.04; baseline Cr 1.2  - recommend urine Na, urine Cr, urine urea nitrogen, and urine Eos  - recommend changing lovenox ppx to heparin ppx in the setting of kidney injury  - continue to avoid nephrotoxic agents  - recommend Renal consult as patient follows closely with [facility]    Thank you for allowing us to participate in the care of this patient. Please contact me if you have any questions regarding this consult.    Signed:  Guille Yeh MD  [facility] Internal Medicine- Pediatrics, HO-IV

## 2023-05-26 NOTE — OUTREACH NOTE
Call Center TCM Note    Flowsheet Row Responses   [facility] patient discharged from? [place]   Does the patient have one of the following disease processes/diagnoses(primary or secondary)? Other   TCM attempt successful? Yes  [ verbal release]   Call start time 1030   Call end time 103   Discharge diagnosis Persistent atrial fibrillation   Meds reviewed with patient/caregiver? Yes   Is the patient having any side effects they believe may be caused by any medication additions or changes? No   Does the patient have all medications ordered at discharge? Yes   Is the patient taking all medications as directed (includes completed medication regime)? Yes   Comments PCP appt on 23 1045 am. Does not state HOSP DC FU   Does the patient have an appointment with their PCP within 7 days of discharge? Yes   Has home health visited the patient within 72 hours of discharge? N/A   Psychosocial issues? No   Did the patient receive a copy of their discharge instructions? Yes   Nursing interventions Reviewed instructions with patient   What is the patient's perception of their health status since discharge? Improving   Is the patient/caregiver able to teach back signs and symptoms related to disease process for when to call PCP? Yes   Is the patient/caregiver able to teach back signs and symptoms related to disease process for when to call 911? Yes   Is the patient/caregiver able to teach back the hierarchy of who to call/visit for symptoms/problems? PCP, Specialist, Home health nurse, Urgent Care, ED, 911 Yes   TCM call completed? Yes   Wrap up additional comments Pt reports he is doing fine.   Call end time 103          Maryellen Burks RN    2023, 10:32 EDT

## 2023-05-26 NOTE — PROGRESS NOTES
Pt was made aware to keep OV 6/6/23 per TB   
Strict prompt return precautions to the Emergency Room discussed for any worsening or new symptoms. The patient verbalized understanding of these precautions and need to return. The patient tolerated PO fluids.  The patient's symptoms progressively improved throughout the ED stay. At the time of discharge from the Emergency Department, the patient is alert with fluent appropriate speech and ambulatory without difficulty.  A medical screening examination was performed and no emergency medical condition was identified.  repeat abd exam soft and non tender @ 1020pm

## 2023-05-31 ENCOUNTER — LAB (OUTPATIENT)
Dept: INTERNAL MEDICINE | Facility: CLINIC | Age: 78
End: 2023-05-31
Payer: MEDICARE

## 2023-06-02 ENCOUNTER — TELEPHONE (OUTPATIENT)
Dept: SLEEP MEDICINE | Facility: HOSPITAL | Age: 78
End: 2023-06-02

## 2023-06-02 NOTE — TELEPHONE ENCOUNTER
Patient had contacted the office last week about some increased AHI in the setting of recent rapid A-fib episode.  It was felt that there was likely some fluid overload going on from the A-fib with rapid rates.  His heart rate had returned to normal after seeing EP.  He had been well controlled on current PAP for years.  He was asymptomatic we did discuss signs/symptoms that would warrant contacting our office or following up with his cardiologist again.  We checked another device download this week which shows that his AHI is now back down to normal since he has been maintaining normal rhythm, back staying below 5/h.  This was an isolated event.  He watches his AHI every morning on his machine to ensure it was well controlled.  If he starts seeing elevated AHI again or has further issues with rate and he will notify our office at which point we will discuss further.  Dr. Phipps in agreement with plan.  Patient will keep follow-up as scheduled or call sooner for issues or concerns.

## 2023-06-06 ENCOUNTER — OFFICE VISIT (OUTPATIENT)
Dept: INTERNAL MEDICINE | Facility: CLINIC | Age: 78
End: 2023-06-06
Payer: MEDICARE

## 2023-06-06 VITALS
BODY MASS INDEX: 30.89 KG/M2 | TEMPERATURE: 96.9 F | HEIGHT: 74 IN | WEIGHT: 240.7 LBS | HEART RATE: 48 BPM | SYSTOLIC BLOOD PRESSURE: 120 MMHG | DIASTOLIC BLOOD PRESSURE: 70 MMHG

## 2023-06-06 DIAGNOSIS — I48.0 PAROXYSMAL ATRIAL FIBRILLATION: Chronic | ICD-10-CM

## 2023-06-06 DIAGNOSIS — I10 ESSENTIAL HYPERTENSION: Chronic | ICD-10-CM

## 2023-06-06 DIAGNOSIS — E03.2 HYPOTHYROIDISM DUE TO MEDICATION: Primary | Chronic | ICD-10-CM

## 2023-06-06 PROCEDURE — 1160F RVW MEDS BY RX/DR IN RCRD: CPT | Performed by: INTERNAL MEDICINE

## 2023-06-06 PROCEDURE — 99214 OFFICE O/P EST MOD 30 MIN: CPT | Performed by: INTERNAL MEDICINE

## 2023-06-06 PROCEDURE — 3074F SYST BP LT 130 MM HG: CPT | Performed by: INTERNAL MEDICINE

## 2023-06-06 PROCEDURE — 1159F MED LIST DOCD IN RCRD: CPT | Performed by: INTERNAL MEDICINE

## 2023-06-06 PROCEDURE — 3078F DIAST BP <80 MM HG: CPT | Performed by: INTERNAL MEDICINE

## 2023-06-06 RX ORDER — LOSARTAN POTASSIUM 25 MG/1
TABLET ORAL
Qty: 30 TABLET | Refills: 2 | Status: SHIPPED | OUTPATIENT
Start: 2023-06-06

## 2023-06-06 NOTE — PROGRESS NOTES
Subjective        Chief Complaint   Patient presents with    Hypothyroidism           Gary Grullon is a 77 y.o. male who presents for    Patient Active Problem List   Diagnosis    Seborrheic keratosis    Hypercholesteremia    Essential hypertension    Tubular adenoma of colon    PMR (polymyalgia rheumatica)    HEDY on CPAP    Pre-diabetes    Class 1 obesity due to excess calories without serious comorbidity with body mass index (BMI) of 31.0 to 31.9 in adult    Paroxysmal atrial fibrillation    Stage 3a chronic kidney disease    History of cardioversion    Tachycardia induced cardiomyopathy    Hypothyroidism due to medication    Persistent atrial fibrillation       History of Present Illness     He was in the hospital recently at [facility] (5/23-5/25)  for afib. He was cardioverted and placed on sotalol. His HR has been down to below 50. He has been checking his BP and it has been 110/65.      He denies constipation, cold intolerance, chest pain or dyspnea. He does feel lightheaded and he thinks it is related to the low HR with sotalol. He is playing racquet sports without any issues.  No Known Allergies    Current Outpatient Medications on File Prior to Visit   Medication Sig Dispense Refill    atorvastatin (LIPITOR) 20 MG tablet TAKE 1 TABLET DAILY (Patient taking differently: Take 1 tablet by mouth Daily.) 90 tablet 3    cholecalciferol (VITAMIN D3) 1000 units tablet Take 1 tablet by mouth Daily. HOLDING FOR DOS      Eliquis 5 MG tablet tablet TAKE 1 TABLET EVERY 12 HOURS (Patient taking differently: Take 1 tablet by mouth Every 12 (Twelve) Hours. HOLDING FOR DOS    LAST DOSE 10-9-22) 180 tablet 3    levothyroxine (Synthroid) 50 MCG tablet Take 1 tablet by mouth Daily. 90 tablet 3    sotalol (BETAPACE) 120 MG tablet Take 1 tablet by mouth Every 12 (Twelve) Hours. (Patient taking differently: Take 60 mg by mouth Every 12 (Twelve) Hours. Takes one tablet by mouth twice daily) 60 tablet 5    vitamin C (ASCORBIC  ACID) 500 MG tablet Take 2 tablets by mouth Daily. HOLDING FOR DOS      [DISCONTINUED] losartan (COZAAR) 25 MG tablet Take 1 tablet by mouth Daily.      [DISCONTINUED] sotalol (BETAPACE) 120 MG tablet Take 1 tablet by mouth Every 12 (Twelve) Hours. 4 tablet 0     No current facility-administered medications on file prior to visit.       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    HEDY (obstructive sleep apnea)     AHI 15/h supine 49/h       Past Surgical History:   Procedure Laterality Date    CATARACT EXTRACTION      COLONOSCOPY  04/04/2018    DR [facility]    CYST REMOVAL  1970    PILONIDAL CYST DRAINAGE      TEMPORAL ARTERY BIOPSY Bilateral 10/11/2022    Procedure: BILATERAL TEMPORAL ARTERY BIOPSY;  Surgeon: José Antonio Ramirez MD;  Location: [facility];  Service: Ophthalmology;  Laterality: Bilateral;       Family History   Problem Relation Age of Onset    COPD Mother     Hypertension Father     Diabetes Father     Stroke Father     Coronary artery disease Father     Kidney disease Sister     Diabetes Brother     No Known Problems Maternal Aunt     No Known Problems Maternal Uncle     No Known Problems Paternal Aunt     No Known Problems Paternal Uncle     No Known Problems Maternal Grandmother     No Known Problems Maternal Grandfather     No Known Problems Paternal Grandmother     No Known Problems Paternal Grandfather     Anesthesia problems Neg Hx     Broken bones Neg Hx     Cancer Neg Hx     Clotting disorder Neg Hx     Collagen disease Neg Hx     Dislocations Neg Hx     Osteoporosis Neg Hx     Rheumatologic disease Neg Hx     Scoliosis Neg Hx     Severe sprains Neg Hx     Malig Hyperthermia Neg Hx        Social History     Socioeconomic History    Marital status:    Tobacco Use    Smoking status: Never    Smokeless tobacco: Never   Vaping Use    Vaping Use: Never used   Substance  "and Sexual Activity    Alcohol use: Yes     Alcohol/week: 14.0 standard drinks     Types: 14 Glasses of wine per week     Comment: 1 glass of wine with dinner/  Daily caffeine use    Drug use: No    Sexual activity: Defer           The following portions of the patient's history were reviewed and updated as appropriate: problem list, allergies, current medications, past medical history, past family history, past social history, and past surgical history.    Review of Systems    Immunization History   Administered Date(s) Administered    COVID-19 (MODERNA) 1st,2nd,3rd Dose Monovalent 01/13/2021, 02/10/2021, 08/25/2021    COVID-19 (MODERNA) BIVALENT 12+YRS 10/19/2022    COVID-19 (MODERNA) Monovalent Original Booster 04/16/2022    FLUAD TRI 65YR+ 10/02/2022    Fluad Quad 65+ 09/13/2019, 10/10/2021    Fluzone High Dose =>65 Years ([employer] ONLY) 09/01/2016, 09/06/2018, 09/01/2020    Fluzone High-Dose 65+yrs 09/30/2022    Hepatitis A 03/21/2006, 10/25/2011    Hepatitis B Adult/Adolescent IM 03/21/2006, 07/08/2007, 02/02/2012    MMR 03/13/2008    Meningococcal, Unspecified 01/14/2013    Pneumococcal Conjugate 13-Valent (PCV13) 08/31/2016    Pneumococcal Conjugate 20-Valent (PCV20) 09/30/2022    Pneumococcal Polysaccharide (PPSV23) 12/10/2011    Polio, Unspecified 01/15/2003    Shingrix 08/12/2019, 06/19/2020    Tdap 01/01/2012, 11/02/2019    Typhoid, Unspecified 01/14/2013    Yellow Fever 01/14/2013    Zostavax 10/19/2012, 08/12/2019       Objective   Vitals:    06/06/23 1048   BP: 120/70   Pulse: (!) 48   Temp: 96.9 °F (36.1 °C)   Weight: 109 kg (240 lb 11.2 oz)   Height: 188 cm (74.02\")     Body mass index is 30.89 kg/m².  Physical Exam  Vitals reviewed.   Constitutional:       Appearance: He is well-developed.   HENT:      Head: Normocephalic and atraumatic.   Cardiovascular:      Rate and Rhythm: Regular rhythm. Bradycardia present.      Heart sounds: Normal heart sounds, S1 normal and S2 normal.   Pulmonary:      " Effort: Pulmonary effort is normal.      Breath sounds: Normal breath sounds.   Skin:     General: Skin is warm.   Neurological:      Mental Status: He is alert.   Psychiatric:         Behavior: Behavior normal.       Procedures    Assessment & Plan   Diagnoses and all orders for this visit:    1. Hypothyroidism due to medication (Primary)  -     TSH; Future  -     T4, Free; Future    2. Paroxysmal atrial fibrillation  Comments:  on reduced dose sotalol    3. Essential hypertension  -     losartan (Cozaar) 25 MG tablet; Take 1/2 tab po daily  Dispense: 30 tablet; Refill: 2  -     Basic Metabolic Panel; Future               Reviewed DC summary, TSH and FT4. Decrease losartan. I think we can let his TSH stay around 5 b/c I am concerned if I increase his thyroid replacement and his TSH were to become suppressed he could have worsening afib.    Return in about 10 weeks (around 8/15/2023) for Medicare Wellness, Lab Before FUP.

## 2023-06-14 DIAGNOSIS — E03.2 HYPOTHYROIDISM DUE TO MEDICATION: ICD-10-CM

## 2023-06-14 RX ORDER — LEVOTHYROXINE SODIUM 0.05 MG/1
TABLET ORAL
Qty: 30 TABLET | Refills: 1 | Status: SHIPPED | OUTPATIENT
Start: 2023-06-14

## 2023-08-11 ENCOUNTER — TELEPHONE (OUTPATIENT)
Dept: CARDIOLOGY | Facility: CLINIC | Age: 78
End: 2023-08-11
Payer: MEDICARE

## 2023-08-11 NOTE — TELEPHONE ENCOUNTER
Pt called stating that he would like to speak with you directly about a personal matter    Pt can be reached at [phone]

## 2023-08-15 ENCOUNTER — OFFICE VISIT (OUTPATIENT)
Dept: INTERNAL MEDICINE | Facility: CLINIC | Age: 78
End: 2023-08-15
Payer: MEDICARE

## 2023-08-15 VITALS
HEART RATE: 48 BPM | DIASTOLIC BLOOD PRESSURE: 92 MMHG | HEIGHT: 74 IN | TEMPERATURE: 97.3 F | SYSTOLIC BLOOD PRESSURE: 170 MMHG | WEIGHT: 243 LBS | BODY MASS INDEX: 31.18 KG/M2

## 2023-08-15 DIAGNOSIS — Z00.00 ENCOUNTER FOR SUBSEQUENT ANNUAL WELLNESS VISIT (AWV) IN MEDICARE PATIENT: Primary | ICD-10-CM

## 2023-08-15 DIAGNOSIS — R73.03 PRE-DIABETES: ICD-10-CM

## 2023-08-15 DIAGNOSIS — G47.33 OSA ON CPAP: ICD-10-CM

## 2023-08-15 DIAGNOSIS — Z99.89 OSA ON CPAP: ICD-10-CM

## 2023-08-15 DIAGNOSIS — D12.6 TUBULAR ADENOMA OF COLON: ICD-10-CM

## 2023-08-15 DIAGNOSIS — E03.2 HYPOTHYROIDISM DUE TO MEDICATION: Chronic | ICD-10-CM

## 2023-08-15 DIAGNOSIS — I10 ESSENTIAL HYPERTENSION: Chronic | ICD-10-CM

## 2023-08-15 DIAGNOSIS — I48.19 PERSISTENT ATRIAL FIBRILLATION: ICD-10-CM

## 2023-08-15 PROBLEM — D36.9 TUBULAR ADENOMA: Status: ACTIVE | Noted: 2023-08-15

## 2023-08-15 RX ORDER — LEVOTHYROXINE SODIUM 0.05 MG/1
50 TABLET ORAL DAILY
COMMUNITY

## 2023-08-15 RX ORDER — LOSARTAN POTASSIUM 25 MG/1
TABLET ORAL
Qty: 90 TABLET | Refills: 1
Start: 2023-08-15

## 2023-08-15 NOTE — PROGRESS NOTES
The ABCs of the Annual Wellness Visit  Subsequent Medicare Wellness Visit    Subjective    Gary Grullon is a 78 y.o. male who presents for a Subsequent Medicare Wellness Visit.  He denies chest pain or dyspnea. His BP has been higher since he got back from [place]. He went into afib on his trip. His BP has been 145/90. He uses his CPAP nightly. He plays tennis and pickle ball. He still can feel lightheaded if he stands too quickly  The following portions of the patient's history were reviewed and   updated as appropriate: allergies, current medications, past family history, past medical history, past social history, past surgical history, and problem list.    Compared to one year ago, the patient feels his physical   health is the same.    Compared to one year ago, the patient feels his mental   health is the same.    Recent Hospitalizations:  This patient has had a [facility] admission record on file within the last 365 days.    Current Medical Providers:  Patient Care Team:  Rommel Wilkerson MD as PCP - General (Internal Medicine)  Yash Soliz MD as Consulting Physician (Cardiology)    Outpatient Medications Prior to Visit   Medication Sig Dispense Refill    atorvastatin (LIPITOR) 20 MG tablet TAKE 1 TABLET DAILY (Patient taking differently: Take 1 tablet by mouth Daily.) 90 tablet 3    cholecalciferol (VITAMIN D3) 1000 units tablet Take 1 tablet by mouth Daily.      Eliquis 5 MG tablet tablet TAKE 1 TABLET EVERY 12 HOURS (Patient taking differently: Take 1 tablet by mouth Every 12 (Twelve) Hours.) 180 tablet 3    levothyroxine (SYNTHROID, LEVOTHROID) 50 MCG tablet Take 1 tablet by mouth Daily.      sotalol (Betapace) 120 MG tablet Take 0.5 tablets by mouth Daily.      vitamin C (ASCORBIC ACID) 500 MG tablet Take 2 tablets by mouth Daily. HOLDING FOR DOS      levothyroxine sodium (TIROSINT) 125 MCG capsule capsule Take 1 capsule by mouth Daily.       No facility-administered medications prior  "to visit.       No opioid medication identified on active medication list. I have reviewed chart for other potential  high risk medication/s and harmful drug interactions in the elderly.        Aspirin is not on active medication list.  Aspirin use is not indicated based on review of current medical condition/s. Risk of harm outweighs potential benefits.  .    Patient Active Problem List   Diagnosis    Seborrheic keratosis    Hypercholesteremia    Essential hypertension    Tubular adenoma of colon    PMR (polymyalgia rheumatica)    HEDY on CPAP    Pre-diabetes    Class 1 obesity due to excess calories without serious comorbidity with body mass index (BMI) of 31.0 to 31.9 in adult    Paroxysmal atrial fibrillation    Stage 3a chronic kidney disease    History of cardioversion    Tachycardia induced cardiomyopathy    Hypothyroidism due to medication    Persistent atrial fibrillation     Advance Care Planning   Advance Care Planning     Advance Directive is not on file.  ACP discussion was held with the patient during this visit. Patient has an advance directive (not in EMR), copy requested.     Objective    Vitals:    08/15/23 1300   BP: 170/92   Pulse: (!) 48   Temp: 97.3 øF (36.3 øC)   TempSrc: Temporal   Weight: 110 kg (243 lb)   Height: 188 cm (74.02\")     Estimated body mass index is 31.19 kg/mý as calculated from the following:    Height as of this encounter: 188 cm (74.02\").    Weight as of this encounter: 110 kg (243 lb).    BMI is >= 30 and <35. (Class 1 Obesity). The following options were offered after discussion;: exercise counseling/recommendations      Does the patient have evidence of cognitive impairment? No          HEALTH RISK ASSESSMENT    Smoking Status:  Social History     Tobacco Use   Smoking Status Never   Smokeless Tobacco Never     Alcohol Consumption:  Social History     Substance and Sexual Activity   Alcohol Use Yes    Comment: 2 glasses of wine daily     Fall Risk Screen:    STEADI Fall " Risk Assessment was completed, and patient is at LOW risk for falls.Assessment completed on:8/15/2023    Depression Screenin/29/2022     3:35 PM   PHQ-2/PHQ-9 Depression Screening   Little Interest or Pleasure in Doing Things 0-->not at all   Feeling Down, Depressed or Hopeless 0-->not at all   PHQ-9: Brief Depression Severity Measure Score 0       Health Habits and Functional and Cognitive Screenin/22/2023     7:00 AM   Functional & Cognitive Status   Do you have difficulty preparing food and eating? No   Do you have difficulty bathing yourself, getting dressed or grooming yourself? No   Do you have difficulty using the toilet? No   Do you have difficulty moving around from place to place? No   Do you have trouble with steps or getting out of a bed or a chair? No   Current Diet Well Balanced Diet   Dental Exam Unknown   Eye Exam Unknown   Exercise (times per week) 4 times per week   Current Exercises Include Tennis   Do you need help using the phone?  No   Are you deaf or do you have serious difficulty hearing?  No   Do you need help to go to places out of walking distance? No   Do you need help shopping? No   Do you need help preparing meals?  No   Do you need help with housework?  No   Do you need help with laundry? No   Do you need help taking your medications? No   Do you need help managing money? No   Do you ever drive or ride in a car without wearing a seat belt? No   Have you felt unusual stress, anger or loneliness in the last month? No   Who do you live with? Spouse   If you need help, do you have trouble finding someone available to you? No   Have you been bothered in the last four weeks by sexual problems? No   Do you have difficulty concentrating, remembering or making decisions? No       Age-appropriate Screening Schedule:  Refer to the list below for future screening recommendations based on patient's age, sex and/or medical conditions. Orders for these recommended tests are listed  "in the plan section. The patient has been provided with a written plan.    Health Maintenance   Topic Date Due    COVID-19 Vaccine (6 - Moderna series) 02/19/2023    INFLUENZA VACCINE  10/01/2023    LIPID PANEL  01/03/2024    ANNUAL WELLNESS VISIT  08/15/2024    COLORECTAL CANCER SCREENING  04/04/2028    TDAP/TD VACCINES (3 - Td or Tdap) 11/02/2029    HEPATITIS C SCREENING  Completed    Pneumococcal Vaccine 65+  Completed    ZOSTER VACCINE  Completed                  CMS Preventative Services Quick Reference  Risk Factors Identified During Encounter  None Identified  The above risks/problems have been discussed with the patient.  Pertinent information has been shared with the patient in the After Visit Summary.  An After Visit Summary and PPPS were made available to the patient.    Follow Up:   Next Medicare Wellness visit to be scheduled in 1 year.       Additional E&M Note during same encounter follows:  Patient has multiple medical problems which are significant and separately identifiable that require additional work above and beyond the Medicare Wellness Visit.      Chief Complaint  Hypertension, paroxysmal atrial fibrillation , and Medicare Wellness-subsequent    Subjective        HPI  Gary Grullon is also being seen today for Medicare Wellness and HTN  He denies chest pain or dyspnea. His BP has been higher since he got back from [place]. He went into afib on his trip. His BP has been 145/90. He uses his CPAP nightly. He plays tennis and pickle ball. He still can feel lightheaded if he stands too quickly       Objective   Vital Signs:  /92   Pulse (!) 48   Temp 97.3 øF (36.3 øC) (Temporal)   Ht 188 cm (74.02\")   Wt 110 kg (243 lb)   BMI 31.19 kg/mý     Physical Exam  Vitals reviewed.   Constitutional:       Appearance: He is well-developed.   HENT:      Head: Normocephalic and atraumatic.   Neck:      Thyroid: No thyromegaly.      Vascular: No carotid bruit.   Cardiovascular:      Rate and " Rhythm: Normal rate and regular rhythm. Bradycardia present.      Heart sounds: Normal heart sounds, S1 normal and S2 normal.   Pulmonary:      Effort: Pulmonary effort is normal.      Breath sounds: Normal breath sounds.   Lymphadenopathy:      Cervical: No cervical adenopathy.   Skin:     General: Skin is warm.   Neurological:      Mental Status: He is alert.   Psychiatric:         Behavior: Behavior normal.                       Assessment and Plan   Diagnoses and all orders for this visit:    1. Subsequent medicare wellness visit (Primary)    2. HEDY on CPAP  Comments:  Uses CPAP    3. Hypothyroidism due to medication  Comments:  I am okay where TSH is. I am not inclined to change current dose.  Orders:  -     T4, Free; Future  -     TSH; Future    4. Essential hypertension  Comments:  Encouraged decreasing etoh.  Orders:  -     losartan (Cozaar) 25 MG tablet; 1/2 tab po daily  Dispense: 90 tablet; Refill: 1  -     Basic Metabolic Panel; Future    5. Persistent atrial fibrillation  Comments:  To have ablation this year    6. Pre-diabetes  -     Hemoglobin A1c; Future    7. Tubular adenoma of colon  Comments:  Due for cscope this year. I want him to get cardiac ablation first             Follow Up   Return in about 6 weeks (around 9/26/2023), or 30 minutes, for Lab Before FUP.  Patient was given instructions and counseling regarding his condition or for health maintenance advice. Please see specific information pulled into the AVS if appropriate.         Reviewed TSH, FT4 and BMP. Kidney function is nl. Could not compare [employer] monitor today as batteries low. He has not been taking Losartan. See above. Encouraged him to make paper list of his meds.

## 2023-10-03 ENCOUNTER — TELEPHONE (OUTPATIENT)
Dept: INTERNAL MEDICINE | Facility: CLINIC | Age: 78
End: 2023-10-03

## 2023-10-03 NOTE — TELEPHONE ENCOUNTER
"    Caller: Gary Grullon \"Ed\"    Relationship to patient: Self    Best call back number: [phone]     Type of visit: LABS    Requested date: EARLY MORNING 10.04.23     If rescheduling, when is the original appointment: 10.03.23     Additional notes: HUB ATTEMPTED WARM TRANSFER BUT WAS UNSUCCESSFUL.          "

## 2023-10-04 ENCOUNTER — LAB (OUTPATIENT)
Facility: HOSPITAL | Age: 78
End: 2023-10-04
Payer: MEDICARE

## 2023-10-04 PROCEDURE — 84443 ASSAY THYROID STIM HORMONE: CPT | Performed by: INTERNAL MEDICINE

## 2023-10-04 PROCEDURE — 80048 BASIC METABOLIC PNL TOTAL CA: CPT | Performed by: INTERNAL MEDICINE

## 2023-10-04 PROCEDURE — 83036 HEMOGLOBIN GLYCOSYLATED A1C: CPT | Performed by: INTERNAL MEDICINE

## 2023-10-04 PROCEDURE — 84439 ASSAY OF FREE THYROXINE: CPT | Performed by: INTERNAL MEDICINE

## 2023-10-05 ENCOUNTER — OFFICE VISIT (OUTPATIENT)
Dept: INTERNAL MEDICINE | Facility: CLINIC | Age: 78
End: 2023-10-05
Payer: MEDICARE

## 2023-10-05 VITALS
HEIGHT: 74 IN | SYSTOLIC BLOOD PRESSURE: 142 MMHG | WEIGHT: 244.6 LBS | DIASTOLIC BLOOD PRESSURE: 84 MMHG | HEART RATE: 48 BPM | BODY MASS INDEX: 31.39 KG/M2 | TEMPERATURE: 97.3 F

## 2023-10-05 DIAGNOSIS — J06.9 ACUTE URI: Primary | ICD-10-CM

## 2023-10-05 DIAGNOSIS — E03.2 HYPOTHYROIDISM DUE TO MEDICATION: Chronic | ICD-10-CM

## 2023-10-05 DIAGNOSIS — R73.03 PRE-DIABETES: ICD-10-CM

## 2023-10-05 DIAGNOSIS — I10 ESSENTIAL HYPERTENSION: Chronic | ICD-10-CM

## 2023-10-05 DIAGNOSIS — Z23 NEED FOR INFLUENZA VACCINATION: ICD-10-CM

## 2023-10-05 LAB
EXPIRATION DATE: NORMAL
FLUAV AG UPPER RESP QL IA.RAPID: NOT DETECTED
FLUBV AG UPPER RESP QL IA.RAPID: NOT DETECTED
INTERNAL CONTROL: NORMAL
Lab: NORMAL
SARS-COV-2 AG UPPER RESP QL IA.RAPID: NOT DETECTED

## 2023-10-05 PROCEDURE — 1159F MED LIST DOCD IN RCRD: CPT | Performed by: INTERNAL MEDICINE

## 2023-10-05 PROCEDURE — 1160F RVW MEDS BY RX/DR IN RCRD: CPT | Performed by: INTERNAL MEDICINE

## 2023-10-05 PROCEDURE — 3077F SYST BP >= 140 MM HG: CPT | Performed by: INTERNAL MEDICINE

## 2023-10-05 PROCEDURE — 87428 SARSCOV & INF VIR A&B AG IA: CPT | Performed by: INTERNAL MEDICINE

## 2023-10-05 PROCEDURE — 3079F DIAST BP 80-89 MM HG: CPT | Performed by: INTERNAL MEDICINE

## 2023-10-05 RX ORDER — LOSARTAN POTASSIUM 25 MG/1
25 TABLET ORAL DAILY
Qty: 90 TABLET | Refills: 1
Start: 2023-10-05

## 2023-10-05 NOTE — PROGRESS NOTES
Subjective        Chief Complaint   Patient presents with    Hypertension           Gary Grullon is a 78 y.o. male who presents for    Patient Active Problem List   Diagnosis    Seborrheic keratosis    Hypercholesteremia    Essential hypertension    Tubular adenoma of colon    PMR (polymyalgia rheumatica)    HEDY on CPAP    Pre-diabetes    Class 1 obesity due to excess calories without serious comorbidity with body mass index (BMI) of 31.0 to 31.9 in adult    Paroxysmal atrial fibrillation    Stage 3a chronic kidney disease    History of cardioversion    Tachycardia induced cardiomyopathy    Hypothyroidism due to medication    Persistent atrial fibrillation       History of Present Illness       He just got back from [place]. He has a cold. He was coughing. He has not been checking his BP. He denies chest pain or dyspnea.   No Known Allergies    Current Outpatient Medications on File Prior to Visit   Medication Sig Dispense Refill    atorvastatin (LIPITOR) 20 MG tablet TAKE 1 TABLET DAILY (Patient taking differently: Take 1 tablet by mouth Daily.) 90 tablet 3    cholecalciferol (VITAMIN D3) 1000 units tablet Take 1 tablet by mouth Daily.      Eliquis 5 MG tablet tablet TAKE 1 TABLET EVERY 12 HOURS (Patient taking differently: Take 1 tablet by mouth Every 12 (Twelve) Hours.) 180 tablet 3    levothyroxine (SYNTHROID, LEVOTHROID) 50 MCG tablet Take 1 tablet by mouth Daily.      sotalol (Betapace) 120 MG tablet Take 0.5 tablets by mouth Daily.      vitamin C (ASCORBIC ACID) 500 MG tablet Take 2 tablets by mouth Daily. HOLDING FOR DOS      [DISCONTINUED] losartan (Cozaar) 25 MG tablet 1/2 tab po daily 90 tablet 1     No current facility-administered medications on file prior to visit.       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    HEDY (obstructive sleep apnea)     AHI 15/h supine 49/h       Past Surgical History:    Procedure Laterality Date    CATARACT EXTRACTION      COLONOSCOPY  04/04/2018    DR [facility]    CYST REMOVAL  1970    PILONIDAL CYST DRAINAGE      TEMPORAL ARTERY BIOPSY Bilateral 10/11/2022    Procedure: BILATERAL TEMPORAL ARTERY BIOPSY;  Surgeon: José Antonio Ramirez MD;  Location: [facility];  Service: Ophthalmology;  Laterality: Bilateral;       Family History   Problem Relation Age of Onset    COPD Mother     Hypertension Father     Diabetes Father     Stroke Father     Coronary artery disease Father     Kidney disease Sister     Diabetes Brother     Other Brother         coronary artery calcification    No Known Problems Maternal Grandmother     No Known Problems Maternal Grandfather     No Known Problems Paternal Grandmother     No Known Problems Paternal Grandfather     No Known Problems Maternal Aunt     No Known Problems Maternal Uncle     No Known Problems Paternal Aunt     No Known Problems Paternal Uncle     Anesthesia problems Neg Hx     Broken bones Neg Hx     Cancer Neg Hx     Clotting disorder Neg Hx     Collagen disease Neg Hx     Dislocations Neg Hx     Osteoporosis Neg Hx     Rheumatologic disease Neg Hx     Scoliosis Neg Hx     Severe sprains Neg Hx     Malig Hyperthermia Neg Hx        Social History     Socioeconomic History    Marital status:    Tobacco Use    Smoking status: Never    Smokeless tobacco: Never   Vaping Use    Vaping Use: Never used   Substance and Sexual Activity    Alcohol use: Yes     Comment: 2 glasses of wine daily    Drug use: No    Sexual activity: Defer           The following portions of the patient's history were reviewed and updated as appropriate: problem list, allergies, current medications, past medical history, past family history, past social history, and past surgical history.    Review of Systems    Immunization History   Administered Date(s) Administered    COVID-19 (MODERNA) 1st,2nd,3rd Dose Monovalent 01/13/2021,  "02/10/2021, 08/25/2021    COVID-19 (MODERNA) BIVALENT 12+YRS 10/19/2022    COVID-19 (MODERNA) Monovalent Original Booster 04/16/2022    FLUAD TRI 65YR+ 10/02/2022    Fluad Quad 65+ 09/13/2019, 10/10/2021    Fluzone High Dose =>65 Years (Vaxcare ONLY) 09/01/2016, 09/06/2018, 09/01/2020    Fluzone High-Dose 65+yrs 09/30/2022    Hepatitis A 03/21/2006, 10/25/2011    Hepatitis B Adult/Adolescent IM 03/21/2006, 07/08/2007, 02/02/2012    MMR 03/13/2008    Meningococcal, Unspecified 01/14/2013    Pneumococcal Conjugate 13-Valent (PCV13) 08/31/2016    Pneumococcal Conjugate 20-Valent (PCV20) 09/30/2022    Pneumococcal Polysaccharide (PPSV23) 12/10/2011    Polio, Unspecified 01/15/2003    Shingrix 08/12/2019, 06/19/2020    Tdap 01/01/2012, 11/02/2019    Typhoid, Unspecified 01/14/2013    Yellow Fever 01/14/2013    Zostavax 10/19/2012, 08/12/2019       Objective   Vitals:    10/05/23 1034 10/05/23 1126   BP: 152/84 142/84   Pulse: (!) 48    Temp: 97.3 °F (36.3 °C)    Weight: 111 kg (244 lb 9.6 oz)    Height: 188 cm (74.02\")      Body mass index is 31.39 kg/m².  Physical Exam  Vitals reviewed.   Constitutional:       Appearance: He is well-developed.   HENT:      Head: Normocephalic and atraumatic.   Cardiovascular:      Rate and Rhythm: Normal rate and regular rhythm. Bradycardia present.      Heart sounds: Normal heart sounds, S1 normal and S2 normal.   Pulmonary:      Effort: Pulmonary effort is normal.      Breath sounds: Normal breath sounds.   Skin:     General: Skin is warm.   Neurological:      Mental Status: He is alert.   Psychiatric:         Behavior: Behavior normal.       Procedures    Assessment & Plan   Diagnoses and all orders for this visit:    1. Acute URI (Primary)  -     POCT SARS-CoV-2 Antigen BON + Flu    2. Essential hypertension  -     losartan (Cozaar) 25 MG tablet; Take 1 tablet by mouth Daily.  Dispense: 90 tablet; Refill: 1  -     Basic Metabolic Panel; Future    3. Hypothyroidism due to " medication  Comments:  TSH and FT4.    4. Pre-diabetes                 Reviewed tsh, ft4, and A1c. Thyroid is at goal. Swab for COVID given cold after trip to [place]. Flu and COVID negative. Ok for flu shot. Omron monitor is accurate. Increase Losartan to whole 25 mg tablet; discussed watching for low BP, fatigue or dizziness.  Return in about 6 weeks (around 11/16/2023), or 30 minutes, for Lab Before FUP.

## 2023-10-09 ENCOUNTER — OFFICE VISIT (OUTPATIENT)
Age: 78
End: 2023-10-09
Payer: MEDICARE

## 2023-10-09 VITALS
SYSTOLIC BLOOD PRESSURE: 130 MMHG | BODY MASS INDEX: 30.8 KG/M2 | HEART RATE: 70 BPM | HEIGHT: 74 IN | WEIGHT: 240 LBS | DIASTOLIC BLOOD PRESSURE: 72 MMHG

## 2023-10-09 DIAGNOSIS — I48.19 PERSISTENT ATRIAL FIBRILLATION: Primary | ICD-10-CM

## 2023-10-09 DIAGNOSIS — I10 ESSENTIAL HYPERTENSION: Chronic | ICD-10-CM

## 2023-10-09 DIAGNOSIS — I43 TACHYCARDIA INDUCED CARDIOMYOPATHY: ICD-10-CM

## 2023-10-09 DIAGNOSIS — R00.0 TACHYCARDIA INDUCED CARDIOMYOPATHY: ICD-10-CM

## 2023-10-09 PROCEDURE — 3075F SYST BP GE 130 - 139MM HG: CPT | Performed by: INTERNAL MEDICINE

## 2023-10-09 PROCEDURE — 3078F DIAST BP <80 MM HG: CPT | Performed by: INTERNAL MEDICINE

## 2023-10-09 PROCEDURE — 93000 ELECTROCARDIOGRAM COMPLETE: CPT | Performed by: INTERNAL MEDICINE

## 2023-10-09 PROCEDURE — 1159F MED LIST DOCD IN RCRD: CPT | Performed by: INTERNAL MEDICINE

## 2023-10-09 PROCEDURE — 99214 OFFICE O/P EST MOD 30 MIN: CPT | Performed by: INTERNAL MEDICINE

## 2023-10-09 PROCEDURE — 1160F RVW MEDS BY RX/DR IN RCRD: CPT | Performed by: INTERNAL MEDICINE

## 2023-10-09 NOTE — PROGRESS NOTES
Date of Office Visit: 10/09/2023  Encounter Provider: Nolan Zamudio MD  Place of Service: [facility] CARDIOLOGY  Patient Name: Gary Grullon  : 1945    Subjective:     Encounter Date:10/09/2023      Patient ID: Gary Grullon is a 78 y.o. male who has a cc of  pers AF and CV and we put him on sotalol.     He's feeling pretty well. Playing tennis, walking.      No anginal chest pain,   No sig brown,   No soa,   No fainting,  No orthostasis.   No edema.   Exercise tolerance: great     There have been no hospital admission since the last visit.     There have been no bleeding events.       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    HEDY (obstructive sleep apnea)     AHI 15/h supine 49/h       Social History     Socioeconomic History    Marital status:    Tobacco Use    Smoking status: Never    Smokeless tobacco: Never   Vaping Use    Vaping Use: Never used   Substance and Sexual Activity    Alcohol use: Yes     Comment: 2 glasses of wine daily    Drug use: No    Sexual activity: Defer       Family History   Problem Relation Age of Onset    COPD Mother     Hypertension Father     Diabetes Father     Stroke Father     Coronary artery disease Father     Kidney disease Sister     Diabetes Brother     Other Brother         coronary artery calcification    No Known Problems Maternal Grandmother     No Known Problems Maternal Grandfather     No Known Problems Paternal Grandmother     No Known Problems Paternal Grandfather     No Known Problems Maternal Aunt     No Known Problems Maternal Uncle     No Known Problems Paternal Aunt     No Known Problems Paternal Uncle     Anesthesia problems Neg Hx     Broken bones Neg Hx     Cancer Neg Hx     Clotting disorder Neg Hx     Collagen disease Neg Hx     Dislocations Neg Hx     Osteoporosis Neg Hx     Rheumatologic disease Neg Hx     Scoliosis Neg Hx      "Severe sprains Neg Hx     Malig Hyperthermia Neg Hx        Review of Systems   Constitutional: Negative for fever and night sweats.   HENT:  Negative for ear pain and stridor.    Eyes:  Negative for discharge and visual halos.   Cardiovascular:  Negative for cyanosis.   Respiratory:  Negative for hemoptysis and sputum production.    Hematologic/Lymphatic: Negative for adenopathy.   Skin:  Negative for nail changes and unusual hair distribution.   Musculoskeletal:  Positive for arthritis and joint pain. Negative for gout.   Gastrointestinal:  Negative for bowel incontinence and flatus.   Genitourinary:  Negative for dysuria and flank pain.   Neurological:  Negative for seizures and tremors.   Psychiatric/Behavioral:  Negative for altered mental status. The patient is not nervous/anxious.             Objective:     Vitals:    10/09/23 0832   BP: 130/72   Pulse: 70   Weight: 109 kg (240 lb)   Height: 188 cm (74.02\")         Eyes:      General:         Right eye: No discharge.         Left eye: No discharge.   HENT:      Head: Normocephalic and atraumatic.   Neck:      Thyroid: No thyromegaly.      Vascular: No JVD.   Pulmonary:      Effort: Pulmonary effort is normal.      Breath sounds: Normal breath sounds. No rales.   Cardiovascular:      Normal rate. Occasional ectopic beats. Regular rhythm.      No gallop.    Edema:     Peripheral edema absent.   Abdominal:      General: Bowel sounds are normal.      Palpations: Abdomen is soft.      Tenderness: There is no abdominal tenderness.   Musculoskeletal: Normal range of motion.         General: No deformity. Skin:     General: Skin is warm and dry.      Findings: No erythema.   Neurological:      Mental Status: Alert and oriented to person, place, and time.      Motor: Normal muscle tone.   Psychiatric:         Behavior: Behavior normal.         Thought Content: Thought content normal.           ECG 12 Lead    Date/Time: 10/9/2023 9:29 AM  Performed by: Nolan Zamudio, " MD    Authorized by: Nolan Zamudio MD  Comparison: compared with previous ECG   Similar to previous ECG  Rhythm: sinus rhythm  Ectopy: atrial premature contractions          Lab Review:       Assessment:          Diagnosis Plan   1. Persistent atrial fibrillation        2. Tachycardia induced cardiomyopathy        3. Essential hypertension               Plan:     We were going to do ablation this fall after his trips to [place]. He's back but but he feels pretty good.     Before ablating I think we need more data -- will do zio and echo

## 2023-10-19 DIAGNOSIS — I48.19 PERSISTENT ATRIAL FIBRILLATION: ICD-10-CM

## 2023-10-19 RX ORDER — ATORVASTATIN CALCIUM 20 MG/1
20 TABLET, FILM COATED ORAL DAILY
Qty: 90 TABLET | Refills: 3 | Status: SHIPPED | OUTPATIENT
Start: 2023-10-19

## 2023-10-19 RX ORDER — ATORVASTATIN CALCIUM 20 MG/1
TABLET, FILM COATED ORAL
Qty: 90 TABLET | Refills: 3 | Status: SHIPPED | OUTPATIENT
Start: 2023-10-19

## 2023-10-19 RX ORDER — APIXABAN 5 MG/1
TABLET, FILM COATED ORAL
Qty: 180 TABLET | Refills: 3 | Status: SHIPPED | OUTPATIENT
Start: 2023-10-19

## 2023-10-19 NOTE — TELEPHONE ENCOUNTER
"Caller: Gary Grullon W \"Ed\"    Relationship: Self    Best call back number: [phone]    Requested Prescriptions:   Requested Prescriptions     Pending Prescriptions Disp Refills    atorvastatin (LIPITOR) 20 MG tablet 90 tablet 3     Sig: Take 1 tablet by mouth Daily.    apixaban (Eliquis) 5 MG tablet tablet 180 tablet 3     Sig: Take 1 tablet by mouth Every 12 (Twelve) Hours.        Pharmacy where request should be sent: [facility] [address] [phone] [facility] [phone]      Last office visit with prescribing clinician: 10/5/2023   Last telemedicine visit with prescribing clinician: Visit date not found   Next office visit with prescribing clinician: 11/16/2023     Additional details provided by patient: PATIENT STATES HE HAS 2 WEEKS LEFT ON HIS CURRENT PRESCRIPTION AND THE PHARMACY IS NEEDING NEW PRESCRIPTIONS FOR THESE MEDICATIONS.       Amelie Fountain, PCT   10/19/23 13:52 EDT       "

## 2023-10-23 ENCOUNTER — TELEPHONE (OUTPATIENT)
Age: 78
End: 2023-10-23
Payer: MEDICARE

## 2023-10-23 ENCOUNTER — HOSPITAL ENCOUNTER (OUTPATIENT)
Dept: CARDIOLOGY | Facility: HOSPITAL | Age: 78
Discharge: HOME | End: 2023-10-23
Admitting: INTERNAL MEDICINE
Payer: MEDICARE

## 2023-10-23 VITALS
BODY MASS INDEX: 30.8 KG/M2 | SYSTOLIC BLOOD PRESSURE: 141 MMHG | WEIGHT: 240 LBS | HEART RATE: 55 BPM | DIASTOLIC BLOOD PRESSURE: 73 MMHG | HEIGHT: 74 IN

## 2023-10-23 DIAGNOSIS — I48.19 PERSISTENT ATRIAL FIBRILLATION: ICD-10-CM

## 2023-10-23 LAB
AORTIC ARCH: 3.1 CM
AORTIC DIMENSIONLESS INDEX: 0.8 (DI)
ASCENDING AORTA: 3.5 CM
BH CV ECHO MEAS - ACS: 1.69 CM
BH CV ECHO MEAS - AO MAX PG: 8 MMHG
BH CV ECHO MEAS - AO MEAN PG: 4.2 MMHG
BH CV ECHO MEAS - AO ROOT DIAM: 3.5 CM
BH CV ECHO MEAS - AO V2 MAX: 141.2 CM/SEC
BH CV ECHO MEAS - AO V2 VTI: 33.6 CM
BH CV ECHO MEAS - AVA(I,D): 2.5 CM2
BH CV ECHO MEAS - EDV(CUBED): 179.1 ML
BH CV ECHO MEAS - EDV(MOD-SP2): 120 ML
BH CV ECHO MEAS - EDV(MOD-SP4): 116 ML
BH CV ECHO MEAS - EF(MOD-BP): 63.6 %
BH CV ECHO MEAS - EF(MOD-SP2): 60.8 %
BH CV ECHO MEAS - EF(MOD-SP4): 67.2 %
BH CV ECHO MEAS - ESV(CUBED): 45.9 ML
BH CV ECHO MEAS - ESV(MOD-SP2): 47 ML
BH CV ECHO MEAS - ESV(MOD-SP4): 38 ML
BH CV ECHO MEAS - FS: 36.5 %
BH CV ECHO MEAS - IVS/LVPW: 1.15 CM
BH CV ECHO MEAS - IVSD: 1 CM
BH CV ECHO MEAS - LAT PEAK E' VEL: 7.8 CM/SEC
BH CV ECHO MEAS - LV DIASTOLIC VOL/BSA (35-75): 49.4 CM2
BH CV ECHO MEAS - LV MASS(C)D: 204 GRAMS
BH CV ECHO MEAS - LV MAX PG: 4.9 MMHG
BH CV ECHO MEAS - LV MEAN PG: 2.6 MMHG
BH CV ECHO MEAS - LV SYSTOLIC VOL/BSA (12-30): 16.2 CM2
BH CV ECHO MEAS - LV V1 MAX: 110.2 CM/SEC
BH CV ECHO MEAS - LV V1 VTI: 26.5 CM
BH CV ECHO MEAS - LVIDD: 5.6 CM
BH CV ECHO MEAS - LVIDS: 3.6 CM
BH CV ECHO MEAS - LVOT AREA: 3.2 CM2
BH CV ECHO MEAS - LVOT DIAM: 2.01 CM
BH CV ECHO MEAS - LVPWD: 0.87 CM
BH CV ECHO MEAS - MED PEAK E' VEL: 7.7 CM/SEC
BH CV ECHO MEAS - MR MAX PG: 82.4 MMHG
BH CV ECHO MEAS - MR MAX VEL: 453.9 CM/SEC
BH CV ECHO MEAS - MV A DUR: 0.13 SEC
BH CV ECHO MEAS - MV A MAX VEL: 87 CM/SEC
BH CV ECHO MEAS - MV DEC SLOPE: 464.4 CM/SEC2
BH CV ECHO MEAS - MV DEC TIME: 214 SEC
BH CV ECHO MEAS - MV E MAX VEL: 86.4 CM/SEC
BH CV ECHO MEAS - MV E/A: 0.99
BH CV ECHO MEAS - MV MAX PG: 4.8 MMHG
BH CV ECHO MEAS - MV MEAN PG: 2.33 MMHG
BH CV ECHO MEAS - MV P1/2T: 66.4 MSEC
BH CV ECHO MEAS - MV V2 VTI: 32.4 CM
BH CV ECHO MEAS - MVA(P1/2T): 3.3 CM2
BH CV ECHO MEAS - MVA(VTI): 2.6 CM2
BH CV ECHO MEAS - PA ACC TIME: 0.15 SEC
BH CV ECHO MEAS - PA V2 MAX: 109.8 CM/SEC
BH CV ECHO MEAS - PULM A REVS DUR: 0.1 SEC
BH CV ECHO MEAS - PULM A REVS VEL: 34.8 CM/SEC
BH CV ECHO MEAS - PULM DIAS VEL: 38.1 CM/SEC
BH CV ECHO MEAS - PULM S/D: 1.8
BH CV ECHO MEAS - PULM SYS VEL: 68.6 CM/SEC
BH CV ECHO MEAS - QP/QS: 0.62
BH CV ECHO MEAS - RAP SYSTOLE: 3 MMHG
BH CV ECHO MEAS - RV MAX PG: 1.06 MMHG
BH CV ECHO MEAS - RV V1 MAX: 51.4 CM/SEC
BH CV ECHO MEAS - RV V1 VTI: 12.8 CM
BH CV ECHO MEAS - RVOT DIAM: 2.27 CM
BH CV ECHO MEAS - RVSP: 30 MMHG
BH CV ECHO MEAS - SI(MOD-SP2): 31.1 ML/M2
BH CV ECHO MEAS - SI(MOD-SP4): 33.2 ML/M2
BH CV ECHO MEAS - SUP REN AO DIAM: 2.4 CM
BH CV ECHO MEAS - SV(LVOT): 84.2 ML
BH CV ECHO MEAS - SV(MOD-SP2): 73 ML
BH CV ECHO MEAS - SV(MOD-SP4): 78 ML
BH CV ECHO MEAS - SV(RVOT): 51.8 ML
BH CV ECHO MEAS - TAPSE (>1.6): 2.26 CM
BH CV ECHO MEAS - TR MAX PG: 26.9 MMHG
BH CV ECHO MEAS - TR MAX VEL: 259.3 CM/SEC
BH CV ECHO MEASUREMENTS AVERAGE E/E' RATIO: 11.15
BH CV ECHO SHUNT ASSESSMENT PERFORMED (HIDDEN SCRIPTING): 1
BH CV XLRA - RV BASE: 4.6 CM
BH CV XLRA - RV LENGTH: 7.8 CM
BH CV XLRA - RV MID: 3.1 CM
BH CV XLRA - TDI S': 11.4 CM/SEC
LEFT ATRIUM VOLUME INDEX: 22.5 ML/M2
SINUS: 3.5 CM
STJ: 2.7 CM

## 2023-10-23 PROCEDURE — 25510000001 PERFLUTREN (DEFINITY) 8.476 MG IN SODIUM CHLORIDE (PF) 0.9 % 10 ML INJECTION: Performed by: INTERNAL MEDICINE

## 2023-10-23 PROCEDURE — 93306 TTE W/DOPPLER COMPLETE: CPT

## 2023-10-23 PROCEDURE — 93306 TTE W/DOPPLER COMPLETE: CPT | Performed by: INTERNAL MEDICINE

## 2023-10-23 RX ADMIN — PERFLUTREN 2 ML: 6.52 INJECTION, SUSPENSION INTRAVENOUS at 08:45

## 2023-10-23 NOTE — TELEPHONE ENCOUNTER
----- Message from Nolan Zamudio MD sent at 10/23/2023  2:25 PM EDT -----  Can you let him know that his echo looked great.  It was normal

## 2023-11-09 DIAGNOSIS — I10 ESSENTIAL HYPERTENSION: Chronic | ICD-10-CM

## 2023-11-10 ENCOUNTER — TELEPHONE (OUTPATIENT)
Age: 78
End: 2023-11-10
Payer: MEDICARE

## 2023-11-10 NOTE — TELEPHONE ENCOUNTER
----- Message from Nolan Zamudio MD sent at 11/10/2023  1:00 PM EST -----  Appt with me to discuss --zio --December is fine    It showed no AF

## 2023-11-11 LAB
BUN SERPL-MCNC: 22 MG/DL (ref 8–23)
BUN/CREAT SERPL: 20 (ref 7–25)
CALCIUM SERPL-MCNC: 9.1 MG/DL (ref 8.6–10.5)
CHLORIDE SERPL-SCNC: 109 MMOL/L (ref 98–107)
CO2 SERPL-SCNC: 25.3 MMOL/L (ref 22–29)
CREAT SERPL-MCNC: 1.1 MG/DL (ref 0.76–1.27)
EGFRCR SERPLBLD CKD-EPI 2021: 69.1 ML/MIN/1.73
GLUCOSE SERPL-MCNC: 110 MG/DL (ref 65–99)
POTASSIUM SERPL-SCNC: 4.4 MMOL/L (ref 3.5–5.2)
SODIUM SERPL-SCNC: 142 MMOL/L (ref 136–145)

## 2023-11-16 ENCOUNTER — OFFICE VISIT (OUTPATIENT)
Dept: INTERNAL MEDICINE | Facility: CLINIC | Age: 78
End: 2023-11-16
Payer: MEDICARE

## 2023-11-16 VITALS
WEIGHT: 244 LBS | SYSTOLIC BLOOD PRESSURE: 120 MMHG | DIASTOLIC BLOOD PRESSURE: 80 MMHG | HEART RATE: 42 BPM | BODY MASS INDEX: 31.32 KG/M2 | HEIGHT: 74 IN

## 2023-11-16 DIAGNOSIS — E78.00 HYPERCHOLESTEREMIA: Chronic | ICD-10-CM

## 2023-11-16 DIAGNOSIS — R73.03 PRE-DIABETES: ICD-10-CM

## 2023-11-16 DIAGNOSIS — E03.2 HYPOTHYROIDISM DUE TO MEDICATION: Chronic | ICD-10-CM

## 2023-11-16 DIAGNOSIS — I10 ESSENTIAL HYPERTENSION: Primary | Chronic | ICD-10-CM

## 2023-11-16 DIAGNOSIS — Z12.5 SCREENING FOR PROSTATE CANCER: ICD-10-CM

## 2023-11-16 PROCEDURE — 1159F MED LIST DOCD IN RCRD: CPT | Performed by: INTERNAL MEDICINE

## 2023-11-16 PROCEDURE — 1160F RVW MEDS BY RX/DR IN RCRD: CPT | Performed by: INTERNAL MEDICINE

## 2023-11-16 PROCEDURE — 3079F DIAST BP 80-89 MM HG: CPT | Performed by: INTERNAL MEDICINE

## 2023-11-16 PROCEDURE — 99214 OFFICE O/P EST MOD 30 MIN: CPT | Performed by: INTERNAL MEDICINE

## 2023-11-16 PROCEDURE — 3074F SYST BP LT 130 MM HG: CPT | Performed by: INTERNAL MEDICINE

## 2023-11-16 NOTE — PROGRESS NOTES
Subjective        Chief Complaint   Patient presents with    Hypertension           Gary Grullon is a 78 y.o. male who presents for    Patient Active Problem List   Diagnosis    Seborrheic keratosis    Hypercholesteremia    Essential hypertension    Tubular adenoma of colon    PMR (polymyalgia rheumatica)    HEDY on CPAP    Pre-diabetes    Class 1 obesity due to excess calories without serious comorbidity with body mass index (BMI) of 31.0 to 31.9 in adult    Paroxysmal atrial fibrillation    Stage 3a chronic kidney disease    History of cardioversion    Tachycardia induced cardiomyopathy    Hypothyroidism due to medication    Persistent atrial fibrillation       History of Present Illness       He has seen the eye doctor; he has dry eyes. He is to try a nasal inhaler for it. His BP has been 107-141/65-81. His EF was nl at 63 percent. His ziopatch did not show any afib.  No Known Allergies    Current Outpatient Medications on File Prior to Visit   Medication Sig Dispense Refill    atorvastatin (LIPITOR) 20 MG tablet Take 1 tablet by mouth Daily. 90 tablet 3    cholecalciferol (VITAMIN D3) 1000 units tablet Take 1 tablet by mouth Daily.      Eliquis 5 MG tablet tablet TAKE 1 TABLET EVERY 12 HOURS 180 tablet 3    levothyroxine (SYNTHROID, LEVOTHROID) 50 MCG tablet Take 1 tablet by mouth Daily.      losartan (Cozaar) 25 MG tablet Take 1 tablet by mouth Daily. 90 tablet 1    sotalol (Betapace) 120 MG tablet Take 0.5 tablets by mouth Daily.      vitamin C (ASCORBIC ACID) 500 MG tablet Take 2 tablets by mouth Daily. HOLDING FOR DOS      [DISCONTINUED] apixaban (Eliquis) 5 MG tablet tablet Take 1 tablet by mouth Every 12 (Twelve) Hours. 180 tablet 3    [DISCONTINUED] atorvastatin (LIPITOR) 20 MG tablet TAKE 1 TABLET DAILY 90 tablet 3     No current facility-administered medications on file prior to visit.       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    HEDY (obstructive sleep apnea)     AHI 15/h supine 49/h       Past Surgical History:   Procedure Laterality Date    CATARACT EXTRACTION      COLONOSCOPY  04/04/2018    DR [facility]    CYST REMOVAL  1970    PILONIDAL CYST DRAINAGE      TEMPORAL ARTERY BIOPSY Bilateral 10/11/2022    Procedure: BILATERAL TEMPORAL ARTERY BIOPSY;  Surgeon: José Antonio Ramirez MD;  Location: [facility];  Service: Ophthalmology;  Laterality: Bilateral;       Family History   Problem Relation Age of Onset    COPD Mother     Hypertension Father     Diabetes Father     Stroke Father     Coronary artery disease Father     Kidney disease Sister     Diabetes Brother     Other Brother         coronary artery calcification    No Known Problems Maternal Grandmother     No Known Problems Maternal Grandfather     No Known Problems Paternal Grandmother     No Known Problems Paternal Grandfather     No Known Problems Maternal Aunt     No Known Problems Maternal Uncle     No Known Problems Paternal Aunt     No Known Problems Paternal Uncle     Anesthesia problems Neg Hx     Broken bones Neg Hx     Cancer Neg Hx     Clotting disorder Neg Hx     Collagen disease Neg Hx     Dislocations Neg Hx     Osteoporosis Neg Hx     Rheumatologic disease Neg Hx     Scoliosis Neg Hx     Severe sprains Neg Hx     Malig Hyperthermia Neg Hx        Social History     Socioeconomic History    Marital status:    Tobacco Use    Smoking status: Never    Smokeless tobacco: Never   Vaping Use    Vaping Use: Never used   Substance and Sexual Activity    Alcohol use: Yes     Comment: 2 glasses of wine daily    Drug use: No    Sexual activity: Defer           The following portions of the patient's history were reviewed and updated as appropriate: problem list, allergies, current medications, past medical history, past family history, past social history, and past  "surgical history.    Review of Systems    Immunization History   Administered Date(s) Administered    COVID-19 (MODERNA) 1st,2nd,3rd Dose Monovalent 01/13/2021, 02/10/2021, 08/25/2021    COVID-19 (MODERNA) BIVALENT 12+YRS 10/19/2022    COVID-19 (MODERNA) Monovalent Original Booster 04/16/2022    FLUAD TRI 65YR+ 10/02/2022    Fluad Quad 65+ 09/13/2019, 10/10/2021    Fluzone High Dose =>65 Years ([employer]care ONLY) 09/01/2016, 09/06/2018, 09/01/2020    Fluzone High-Dose 65+yrs 09/30/2022, 10/05/2023    Hepatitis A 03/21/2006, 10/25/2011    Hepatitis B Adult/Adolescent IM 03/21/2006, 07/08/2007, 02/02/2012    MMR 03/13/2008    Meningococcal, Unspecified 01/14/2013    Pneumococcal Conjugate 13-Valent (PCV13) 08/31/2016    Pneumococcal Conjugate 20-Valent (PCV20) 09/30/2022    Pneumococcal Polysaccharide (PPSV23) 12/10/2011    Polio, Unspecified 01/15/2003    Shingrix 08/12/2019, 06/19/2020    Tdap 01/01/2012, 11/02/2019    Typhoid, Unspecified 01/14/2013    Yellow Fever 01/14/2013    Zostavax 10/19/2012, 08/12/2019       Objective   Vitals:    11/16/23 1130   BP: 120/80   Pulse: (!) 42   Weight: 111 kg (244 lb)   Height: 188 cm (74.02\")     Body mass index is 31.31 kg/m².  Physical Exam  Vitals reviewed.   Constitutional:       Appearance: He is well-developed.   HENT:      Head: Normocephalic and atraumatic.   Cardiovascular:      Rate and Rhythm: Regular rhythm. Bradycardia present.      Heart sounds: Normal heart sounds, S1 normal and S2 normal.   Pulmonary:      Effort: Pulmonary effort is normal.      Breath sounds: Normal breath sounds.   Skin:     General: Skin is warm.   Neurological:      Mental Status: He is alert.   Psychiatric:         Behavior: Behavior normal.         Procedures    Assessment & Plan   Diagnoses and all orders for this visit:    1. Essential hypertension (Primary)  -     Comprehensive Metabolic Panel; Future    2. Hypercholesteremia  -     Lipid Panel With / Chol / HDL Ratio; Future    3. " Hypothyroidism due to medication  -     T4, Free; Future  -     TSH; Future    4. Pre-diabetes  -     Hemoglobin A1c; Future    5. Screening for prostate cancer  -     PSA Screen; Future                 Reviewed bmp. He knows he is due for a cscope. He will set up after he gets okay from cards.  BP is good.   Return in about 3 months (around 2/16/2024), or 30 minutes, for Lab Before FUP.

## 2024-01-11 ENCOUNTER — OFFICE VISIT (OUTPATIENT)
Dept: SLEEP MEDICINE | Facility: HOSPITAL | Age: 79
End: 2024-01-11
Payer: MEDICARE

## 2024-01-11 VITALS
HEART RATE: 55 BPM | OXYGEN SATURATION: 96 % | WEIGHT: 244 LBS | SYSTOLIC BLOOD PRESSURE: 132 MMHG | HEIGHT: 74 IN | BODY MASS INDEX: 31.32 KG/M2 | DIASTOLIC BLOOD PRESSURE: 75 MMHG

## 2024-01-11 DIAGNOSIS — E66.09 CLASS 1 OBESITY DUE TO EXCESS CALORIES WITHOUT SERIOUS COMORBIDITY WITH BODY MASS INDEX (BMI) OF 31.0 TO 31.9 IN ADULT: ICD-10-CM

## 2024-01-11 DIAGNOSIS — G47.33 OSA ON CPAP: Primary | ICD-10-CM

## 2024-01-11 PROCEDURE — 1159F MED LIST DOCD IN RCRD: CPT | Performed by: INTERNAL MEDICINE

## 2024-01-11 PROCEDURE — G0463 HOSPITAL OUTPT CLINIC VISIT: HCPCS

## 2024-01-11 PROCEDURE — 1160F RVW MEDS BY RX/DR IN RCRD: CPT | Performed by: INTERNAL MEDICINE

## 2024-01-11 PROCEDURE — 3078F DIAST BP <80 MM HG: CPT | Performed by: INTERNAL MEDICINE

## 2024-01-11 PROCEDURE — 3075F SYST BP GE 130 - 139MM HG: CPT | Performed by: INTERNAL MEDICINE

## 2024-01-11 PROCEDURE — 99213 OFFICE O/P EST LOW 20 MIN: CPT | Performed by: INTERNAL MEDICINE

## 2024-01-11 NOTE — PROGRESS NOTES
"  [facility]  [address] [address]  Phone   Fax       SLEEP CLINIC FOLLOW UP PROGRESS NOTE.    Gary Grullon  [id number]   1945  78 y.o.  male      PCP: Rommel Wilkerson MD      Date of visit: 1/11/2024    Chief Complaint   Patient presents with    Sleep Apnea    Obesity       HPI:  This is a 78 y.o. years old patient is here for the management of obstructive sleep apnea.  Sleep apnea is moderate in severity with a AHI of 15/hr. Patient is using positive airway pressure therapy with auto CPAP and the symptoms of sleep apnea have improved significantly on the therapy. Normally patient goes to bed at 11 PM and wakes up at 6 AM .  The patient wakes up 1-2 time(s) during the night and has no problem going back to sleep.  Feels refreshed after waking up.     Medications and allergies are reviewed by me and documented in the encounter.     SOCIAL (habits pertaining to sleep medicine)  History tobacco use:No   History of alcohol use: 7 per week  Caffeine use: 2     REVIEW OF SYSTEMS:   Pertaining positive symptoms are:  Romney Sleepiness Scale :Total score: 4         PHYSICAL EXAMINATION:  CONSTITUTIONAL:  Vitals:    01/11/24 0846   BP: 132/75   Pulse: 55   SpO2: 96%   Weight: 111 kg (244 lb)   Height: 188 cm (74.02\")    Body mass index is 31.31 kg/m².   NOSE: nasal passages are clear, No deformities noted   RESP SYSTEM: Not in any respiratory distress, no chest deformities noted,   CARDIOVASULAR: No edema noted  NEURO: Oriented x 3, gait normal,  Mood and affect appeared appropriate      Data reviewed:  The Smart card downloaded on 1/11/2024 has been reviewed independently by me for compliance and discussed the data with the patient.   Compliance; 100%  More than 4 hr use, 90%  Average use of the device 6 hours and 25 minutes per night  Residual AHI: 1.1 /hr (goal < 5.0 /hr)  Mask type: Nasal pillows  Device: ResMed  DME: [employer] [facility]      ASSESSMENT AND PLAN:  Obstructive sleep apnea ( G 47.33).  The symptoms of sleep apnea have improved with the device and the treatment.  Patient's compliance with the device is excellent for treatment of sleep apnea.  I have independently reviewed the smart card down load and discussed with the patient the download data and encouarged the patient to continue to use the device.The residual AHI is acceptable. The device is benefiting the patient and the device is medically necessary.  Without proper control of sleep apnea and good compliance there is a increased risk for hypertension, diabetes mellitus and nonrestorative sleep with hypersomnia which can increase risk for motor vehicle accidents.  Untreated sleep apnea is also a risk factor for development of atrial fibrillation, pulmonary hypertension, insulin resistance and stroke. The patient is also instructed to get the supplies from the [employer] company and and change them on a regular basis.  A prescription for supplies has been sent to the [employer] company.  I have also discussed the good sleep hygiene habits and adequate amount of sleep needed for good health.  Obesity  1 with BMI is Body mass index is 31.31 kg/m².. I have discuss the relationship between the weight and sleep apnea. The benefit of weight loss in reducing severity of sleep apnea was discussed. Discussed diet and exercise with the patient to achieve ideal BMI.   Return in about 1 year (around 1/11/2025) for with smart card down load. . Patient's questions were answered.    1/11/2024  Justin Phipps MD  Sleep Medicine.  Medical Director,   [facility].

## 2024-02-19 ENCOUNTER — OFFICE VISIT (OUTPATIENT)
Dept: INTERNAL MEDICINE | Facility: CLINIC | Age: 79
End: 2024-02-19
Payer: MEDICARE

## 2024-02-19 VITALS
WEIGHT: 240.4 LBS | SYSTOLIC BLOOD PRESSURE: 122 MMHG | DIASTOLIC BLOOD PRESSURE: 84 MMHG | BODY MASS INDEX: 30.85 KG/M2 | HEIGHT: 74 IN

## 2024-02-19 DIAGNOSIS — I10 ESSENTIAL HYPERTENSION: Primary | Chronic | ICD-10-CM

## 2024-02-19 DIAGNOSIS — E78.00 HYPERCHOLESTEREMIA: Chronic | ICD-10-CM

## 2024-02-19 DIAGNOSIS — R73.03 PRE-DIABETES: ICD-10-CM

## 2024-02-19 DIAGNOSIS — N18.31 STAGE 3A CHRONIC KIDNEY DISEASE: ICD-10-CM

## 2024-02-19 DIAGNOSIS — E03.2 HYPOTHYROIDISM DUE TO MEDICATION: Chronic | ICD-10-CM

## 2024-02-19 PROCEDURE — 99213 OFFICE O/P EST LOW 20 MIN: CPT | Performed by: INTERNAL MEDICINE

## 2024-02-19 PROCEDURE — 1159F MED LIST DOCD IN RCRD: CPT | Performed by: INTERNAL MEDICINE

## 2024-02-19 PROCEDURE — 3074F SYST BP LT 130 MM HG: CPT | Performed by: INTERNAL MEDICINE

## 2024-02-19 PROCEDURE — 3079F DIAST BP 80-89 MM HG: CPT | Performed by: INTERNAL MEDICINE

## 2024-02-19 PROCEDURE — 1160F RVW MEDS BY RX/DR IN RCRD: CPT | Performed by: INTERNAL MEDICINE

## 2024-02-19 NOTE — PROGRESS NOTES
Subjective        Chief Complaint   Patient presents with    Hyperlipidemia           Gary CAZARES Mitchel is a 78 y.o. male who presents for    Patient Active Problem List   Diagnosis    Hypercholesteremia    Essential hypertension    Tubular adenoma of colon    PMR (polymyalgia rheumatica)    HEDY on CPAP    Pre-diabetes    Class 1 obesity due to excess calories without serious comorbidity with body mass index (BMI) of 31.0 to 31.9 in adult    Paroxysmal atrial fibrillation    Stage 3a chronic kidney disease    History of cardioversion    Tachycardia induced cardiomyopathy    Hypothyroidism due to medication       History of Present Illness     He denies chest pain or dyspnea. He has not checked his BP recently. He uses a CPAP machine.    No Known Allergies    Current Outpatient Medications on File Prior to Visit   Medication Sig Dispense Refill    atorvastatin (LIPITOR) 20 MG tablet Take 1 tablet by mouth Daily. 90 tablet 3    cholecalciferol (VITAMIN D3) 1000 units tablet Take 1 tablet by mouth Daily.      Eliquis 5 MG tablet tablet TAKE 1 TABLET EVERY 12 HOURS 180 tablet 3    levothyroxine (SYNTHROID, LEVOTHROID) 50 MCG tablet Take 1 tablet by mouth Daily.      losartan (Cozaar) 25 MG tablet Take 1 tablet by mouth Daily. 90 tablet 1    sotalol (Betapace) 120 MG tablet Take 0.5 tablets by mouth Daily.      vitamin C (ASCORBIC ACID) 500 MG tablet Take 2 tablets by mouth Daily. HOLDING FOR DOS       No current facility-administered medications on file prior to visit.       Past Medical History:   Diagnosis Date    Chronically dry eyes, bilateral 11/16/2023    COVID-19 01/2022    NAION (non-arteritic anterior ischemic optic neuropathy), 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    HEDY (obstructive sleep apnea)     AHI 15/h supine 49/h       Past Surgical History:   Procedure Laterality Date    CATARACT EXTRACTION      COLONOSCOPY  04/04/2018    DR Gary Paredes [facility]    CYST REMOVAL  1970    PILONIDAL CYST DRAINAGE      TEMPORAL ARTERY  BIOPSY Bilateral 10/11/2022    Procedure: BILATERAL TEMPORAL ARTERY BIOPSY;  Surgeon: José Antonio Ramirez MD;  Location: [facility] OR;  Service: Ophthalmology;  Laterality: Bilateral;       Family History   Problem Relation Age of Onset    COPD Mother     Hypertension Father     Diabetes Father     Stroke Father     Coronary artery disease Father     Kidney disease Sister     Diabetes Brother     Other Brother         coronary artery calcification    No Known Problems Maternal Grandmother     No Known Problems Maternal Grandfather     No Known Problems Paternal Grandmother     No Known Problems Paternal Grandfather     No Known Problems Maternal Aunt     No Known Problems Maternal Uncle     No Known Problems Paternal Aunt     No Known Problems Paternal Uncle     Anesthesia problems Neg Hx     Broken bones Neg Hx     Cancer Neg Hx     Clotting disorder Neg Hx     Collagen disease Neg Hx     Dislocations Neg Hx     Osteoporosis Neg Hx     Rheumatologic disease Neg Hx     Scoliosis Neg Hx     Severe sprains Neg Hx     Malig Hyperthermia Neg Hx        Social History     Socioeconomic History    Marital status:    Tobacco Use    Smoking status: Never    Smokeless tobacco: Never   Vaping Use    Vaping Use: Never used   Substance and Sexual Activity    Alcohol use: Yes     Comment: 2 glasses of wine daily    Drug use: No    Sexual activity: Defer           The following portions of the patient's history were reviewed and updated as appropriate: problem list, allergies, current medications, past medical history, past family history, past social history, and past surgical history.    Review of Systems    Immunization History   Administered Date(s) Administered    COVID-19 (MODERNA) 1st,2nd,3rd Dose Monovalent 01/13/2021, 02/10/2021, 08/25/2021    COVID-19 (MODERNA) BIVALENT 12+YRS 10/19/2022    COVID-19 (MODERNA) Monovalent Original Booster 04/16/2022    COVID-19 F23 (MODERNA) 12YRS+ (SPIKEVAX) 12/13/2023     "FLUAD TRI 65YR+ 10/02/2022    Fluad Quad 65+ 09/13/2019, 10/10/2021    Fluzone High Dose =>65 Years (Vaxcare ONLY) 09/01/2016, 09/06/2018, 09/01/2020    Fluzone High-Dose 65+yrs 09/30/2022, 10/05/2023    Hepatitis A 03/21/2006, 10/25/2011    Hepatitis B Adult/Adolescent IM 03/21/2006, 07/08/2007, 02/02/2012    MMR 03/13/2008    Meningococcal, Unspecified 01/14/2013    Pneumococcal Conjugate 13-Valent (PCV13) 08/31/2016    Pneumococcal Conjugate 20-Valent (PCV20) 09/30/2022    Pneumococcal Polysaccharide (PPSV23) 12/10/2011    Polio, Unspecified 01/15/2003    Shingrix 08/12/2019, 06/19/2020    Tdap 01/01/2012, 11/02/2019    Typhoid, Unspecified 01/14/2013    Yellow Fever 01/14/2013    Zostavax 10/19/2012, 08/12/2019       Objective   Vitals:    02/19/24 1508   BP: 122/84   Weight: 109 kg (240 lb 6.4 oz)   Height: 188 cm (74.02\")     Body mass index is 30.85 kg/m².  Physical Exam  Vitals reviewed.   Constitutional:       Appearance: He is well-developed.   HENT:      Head: Normocephalic and atraumatic.   Cardiovascular:      Rate and Rhythm: Normal rate and regular rhythm.      Heart sounds: Normal heart sounds, S1 normal and S2 normal.   Pulmonary:      Effort: Pulmonary effort is normal.      Breath sounds: Normal breath sounds.   Skin:     General: Skin is warm.   Neurological:      Mental Status: He is alert.   Psychiatric:         Behavior: Behavior normal.         Procedures    Assessment & Plan   Diagnoses and all orders for this visit:    1. Essential hypertension (Primary)    2. Hypercholesteremia    3. Pre-diabetes  -     Hemoglobin A1c; Future    4. Hypothyroidism due to medication  -     T4, Free; Future  -     TSH; Future    5. Stage 3a chronic kidney disease  -     Basic Metabolic Panel; Future               Labs pending. BP is good. He sees Dr. Zamudio soon and then he will set up cscope.     Return in about 6 months (around 8/19/2024) for Medicare Wellness, Lab Before FUP.  "

## 2024-02-28 ENCOUNTER — OFFICE VISIT (OUTPATIENT)
Age: 79
End: 2024-02-28
Payer: MEDICARE

## 2024-02-28 VITALS
WEIGHT: 243 LBS | DIASTOLIC BLOOD PRESSURE: 70 MMHG | HEART RATE: 49 BPM | SYSTOLIC BLOOD PRESSURE: 130 MMHG | BODY MASS INDEX: 31.18 KG/M2 | HEIGHT: 74 IN

## 2024-02-28 DIAGNOSIS — I43 TACHYCARDIA INDUCED CARDIOMYOPATHY: ICD-10-CM

## 2024-02-28 DIAGNOSIS — I48.0 PAROXYSMAL ATRIAL FIBRILLATION: Primary | Chronic | ICD-10-CM

## 2024-02-28 DIAGNOSIS — M35.3 PMR (POLYMYALGIA RHEUMATICA): ICD-10-CM

## 2024-02-28 DIAGNOSIS — I10 ESSENTIAL HYPERTENSION: Chronic | ICD-10-CM

## 2024-02-28 DIAGNOSIS — R00.0 TACHYCARDIA INDUCED CARDIOMYOPATHY: ICD-10-CM

## 2024-02-28 PROCEDURE — 3078F DIAST BP <80 MM HG: CPT | Performed by: INTERNAL MEDICINE

## 2024-02-28 PROCEDURE — 99214 OFFICE O/P EST MOD 30 MIN: CPT | Performed by: INTERNAL MEDICINE

## 2024-02-28 PROCEDURE — 93000 ELECTROCARDIOGRAM COMPLETE: CPT | Performed by: INTERNAL MEDICINE

## 2024-02-28 PROCEDURE — 3075F SYST BP GE 130 - 139MM HG: CPT | Performed by: INTERNAL MEDICINE

## 2024-02-28 RX ORDER — CYCLOSPORINE OPHTHALMIC SOLUTION 1 MG/ML
SOLUTION/ DROPS OPHTHALMIC
COMMUNITY
Start: 2024-02-26

## 2024-02-28 NOTE — PROGRESS NOTES
Date of Office Visit: 2024  Encounter Provider: Nolan Zamudio MD  Place of Service: [facility] CARDIOLOGY  Patient Name: Gary Grullon  : 1945    Subjective:     Encounter Date:2024      Patient ID: Gary Grullon is a 78 y.o. male who has a cc of  PAf and we were going to do ablation but he got better.     We held off on ablation. He plays tennis and pickleball. No limitations. He feels no palp. No brown and no soa.     He had a monitor and has lots of PACs 21% but no AF.     He has some orthostasis.     There have been no hospital admission since the last visit.     There have been no bleeding events.       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    HEDY (obstructive sleep apnea)     AHI 15/h supine 49/h       Social History     Socioeconomic History    Marital status:    Tobacco Use    Smoking status: Never    Smokeless tobacco: Never    Tobacco comments:     Caffeine use    Vaping Use    Vaping Use: Never used   Substance and Sexual Activity    Alcohol use: Yes     Comment: 2 glasses of wine daily    Drug use: No    Sexual activity: Defer       Family History   Problem Relation Age of Onset    COPD Mother     Hypertension Father     Diabetes Father     Stroke Father     Coronary artery disease Father     Kidney disease Sister     Diabetes Brother     Other Brother         coronary artery calcification    No Known Problems Maternal Grandmother     No Known Problems Maternal Grandfather     No Known Problems Paternal Grandmother     No Known Problems Paternal Grandfather     No Known Problems Maternal Aunt     No Known Problems Maternal Uncle     No Known Problems Paternal Aunt     No Known Problems Paternal Uncle     Anesthesia problems Neg Hx     Broken bones Neg Hx     Cancer Neg Hx     Clotting disorder Neg Hx     Collagen  "disease Neg Hx     Dislocations Neg Hx     Osteoporosis Neg Hx     Rheumatologic disease Neg Hx     Scoliosis Neg Hx     Severe sprains Neg Hx     Malig Hyperthermia Neg Hx        Review of Systems   Constitutional: Negative for fever and night sweats.   HENT:  Negative for ear pain and stridor.    Eyes:  Negative for discharge and visual halos.   Cardiovascular:  Negative for cyanosis.   Respiratory:  Negative for hemoptysis and sputum production.    Hematologic/Lymphatic: Negative for adenopathy.   Skin:  Negative for nail changes and unusual hair distribution.   Musculoskeletal:  Negative for gout and joint swelling.   Gastrointestinal:  Negative for bowel incontinence and flatus.   Genitourinary:  Negative for dysuria and flank pain.   Neurological:  Negative for seizures and tremors.   Psychiatric/Behavioral:  Negative for altered mental status. The patient is not nervous/anxious.             Objective:     Vitals:    02/28/24 0848   BP: 130/70   BP Location: Left arm   Patient Position: Sitting   Pulse: (!) 49   Weight: 110 kg (243 lb)   Height: 188 cm (74.02\")         Eyes:      General:         Right eye: No discharge.         Left eye: No discharge.   HENT:      Head: Normocephalic and atraumatic.   Neck:      Thyroid: No thyromegaly.      Vascular: No JVD.   Pulmonary:      Effort: Pulmonary effort is normal.      Breath sounds: Normal breath sounds. No rales.   Cardiovascular:      Bradycardia present. Occasional ectopic beats. Regular rhythm.      No gallop.    Edema:     Peripheral edema absent.   Abdominal:      General: Bowel sounds are normal.      Palpations: Abdomen is soft.      Tenderness: There is no abdominal tenderness.   Musculoskeletal: Normal range of motion.         General: No deformity. Skin:     General: Skin is warm and dry.      Findings: No erythema.   Neurological:      Mental Status: Alert and oriented to person, place, and time.      Motor: Normal muscle tone.   Psychiatric:       "   Behavior: Behavior normal.         Thought Content: Thought content normal.           ECG 12 Lead    Date/Time: 2/28/2024 9:36 AM  Performed by: Nolan Zamudio MD    Authorized by: Nolan Zamudio MD  Comparison: compared with previous ECG   Similar to previous ECG  Rhythm: sinus rhythm  Ectopy: atrial premature contractions          Lab Review:       Assessment:          Diagnosis Plan   1. Paroxysmal atrial fibrillation        2. Tachycardia induced cardiomyopathy        3. Essential hypertension        4. PMR (polymyalgia rheumatica)               Plan:     AF -- is better and we should hold off on ablation and keep the sotalol. QT is ok. He is on a-ban.     Dizziness -- I wonder if his bp is too low at times and I would try a trial off losartan and if average SBP remain below 140-150.

## 2024-03-04 ENCOUNTER — TELEPHONE (OUTPATIENT)
Age: 79
End: 2024-03-04
Payer: MEDICARE

## 2024-03-04 NOTE — TELEPHONE ENCOUNTER
"Regarding: Medication Question  Contact: [phone]  ----- Message from Nancy Alberto MA sent at 3/4/2024  8:16 AM EST -----       ----- Message from Gary Grullon \"Ed\" to Nolan Zamudio MD sent at 3/3/2024  5:18 PM -----   Saturday evening 115 / 72 / 55   Sunday afternoon 118/68 / 56      ----- Message -----       From:aGry Grullon       Sent:3/2/2024  2:40 PM EST         To:Patient Medical Advice Request Message List    Subject:Medication Question    An hour after 2nd 45min of hard tennis.  101/79/52 - feeling fine - seems to be out of AFib      ----- Message -----       From:Gary Grullon       Sent:3/2/2024  9:33 AM EST         To:Patient Medical Advice Request Message List    Subject:Medication Question    This morning took vitals three times - 101/83/106.  117/82/94.   108/73/85. Feeling ok / going to tennis      ----- Message -----       From:Gary Grullon       Sent:3/2/2024 12:09 AM EST         To:Patient Medical Advice Request Message List    Subject:Medication Question    Dr. Zamudio / Angie Duncan / Maryellen CHA –   (Continued from earlier)    In the morning I am scheduled to play tennis.  I will monitor my vitals when I wake up and see how I feel.  If I am not back in sinus rhythm in the morning I believe I am supposed to take one 25MG Toprol along with the other meds.  I will also monitor this patient portal for more advice.  Thanks in advance for your reply. Dino Grullon      ----- Message -----       From:Gary Grullon       Sent:3/2/2024 12:05 AM EST         To:Patient Medical Advice Request Message List    Subject:Medication Question    Dr. Zamudio / Angie Duncan / Maryellen CHA –   Since I left my appointment with Dr. SPEARS on Wednesday morning some strange things have happened.  I got a little confused about the change in medications Dr. SPEARS recommended and I sent a note via the patient portal to clarify.  As a result, I did not take any soltalol or losartan on either Wednesday or Thursday.   " Thursday evening a played three hard sets of tennis and felt very good after. On Friday, I took the losartan along with my other meds as usual. I was having a very inactive afternoon (watching television) when my Apple Watch went off with an indication that I was in AFib and having a high heart rate.  I monitored it for the rest of the day and I remained in AFib with a heart rate of 118 / 122 / 122 / 135 / 127 through the evening.  Just before I went to bed on Friday, I took my BP and it was 108 systolic / 82 diastolic and HR 62  I went back to some of my records and discovered that Dr. Wilkerson had reduced my daily Losartan on 8/15/23 to 12.5MG (half a tablet).  For whatever reason I was not doing it.  Your records show that I was taking the original amount of 25MG.  Now I feel just fine and have never felt any racing of my heart.  I want to get out of AFib as soon as possible and I logically reasoned that it probably had something to do with missing the two drugs for those two / three days.  Based on recommendations from last summer 2 25MG Metoprolol Sacconate XL.  After about 24 hours it went back into sinus rhythm then.      ----- Message -----       From:Maryellen CHA       Sent:3/1/2024  8:07 AM EST         To:Gary Grullon    Subject:Medication Question    Good Morning Mr. Grullon  Yes you should continue to take your sotalol as you have been..Maryellen      ----- Message -----       From:Gary Grullon       Sent:2/29/2024  2:48 PM EST         To:Patient Medical Advice Request Message List    Subject:Medication Question    OK - Should I still be taking the 60mg Sotalol?      ----- Message -----       From:Maryellen CHA       Sent:2/29/2024  2:38 PM EST         To:Gary Grullon    Subject:Medication Question    Good afternoon Mr. Grullon  The medication he suggested you should stop is losartan. Monitor the BP.  Maryellen      ----- Message -----       From:Gary Grullon       Sent:2/29/2024 12:31 PM EST         To:Nolan  Lizz    Subject:Medication Question    Yesterday during my appointment we discussed stopping taking one medication, while continuing to monitor my blood pressure in order to reduce the light-headedness we discussed.  I just want to be sure that medication to stop taking is the 1/2 pill (60mg) of the Sotalol.  I tend to confuse this with the Losartan (Cozaar).  If I understand correctly I am to continue taking Cozaar (25MG) every day.  Please confirm these issue for me ASAP.  Thank you

## 2024-03-14 RX ORDER — SOTALOL HYDROCHLORIDE 120 MG/1
60 TABLET ORAL DAILY
Qty: 45 TABLET | Refills: 2 | Status: SHIPPED | OUTPATIENT
Start: 2024-03-14

## 2024-06-05 RX ORDER — LEVOTHYROXINE SODIUM 0.05 MG/1
50 TABLET ORAL DAILY
Qty: 90 TABLET | Refills: 3 | Status: SHIPPED | OUTPATIENT
Start: 2024-06-05

## 2024-06-11 ENCOUNTER — LAB (OUTPATIENT)
Facility: HOSPITAL | Age: 79
End: 2024-06-11
Payer: MEDICARE

## 2024-06-11 ENCOUNTER — TRANSCRIBE ORDERS (OUTPATIENT)
Dept: CARDIOLOGY | Facility: CLINIC | Age: 79
End: 2024-06-11
Payer: MEDICARE

## 2024-06-11 ENCOUNTER — TELEPHONE (OUTPATIENT)
Age: 79
End: 2024-06-11
Payer: MEDICARE

## 2024-06-11 DIAGNOSIS — Z01.810 PRE-OPERATIVE CARDIOVASCULAR EXAMINATION: Primary | ICD-10-CM

## 2024-06-11 DIAGNOSIS — I48.0 PAROXYSMAL ATRIAL FIBRILLATION: Primary | Chronic | ICD-10-CM

## 2024-06-11 DIAGNOSIS — Z13.6 SCREENING FOR ISCHEMIC HEART DISEASE: ICD-10-CM

## 2024-06-11 DIAGNOSIS — Z01.810 PRE-OPERATIVE CARDIOVASCULAR EXAMINATION: ICD-10-CM

## 2024-06-11 LAB
ANION GAP SERPL CALCULATED.3IONS-SCNC: 8.8 MMOL/L (ref 5–15)
BASOPHILS # BLD AUTO: 0.05 10*3/MM3 (ref 0–0.2)
BASOPHILS NFR BLD AUTO: 0.7 % (ref 0–1.5)
BUN SERPL-MCNC: 18 MG/DL (ref 8–23)
BUN/CREAT SERPL: 14.6 (ref 7–25)
CALCIUM SPEC-SCNC: 9.6 MG/DL (ref 8.6–10.5)
CHLORIDE SERPL-SCNC: 105 MMOL/L (ref 98–107)
CO2 SERPL-SCNC: 23.2 MMOL/L (ref 22–29)
CREAT SERPL-MCNC: 1.23 MG/DL (ref 0.76–1.27)
DEPRECATED RDW RBC AUTO: 42.5 FL (ref 37–54)
EGFRCR SERPLBLD CKD-EPI 2021: 60.5 ML/MIN/1.73
EOSINOPHIL # BLD AUTO: 0.61 10*3/MM3 (ref 0–0.4)
EOSINOPHIL NFR BLD AUTO: 8.1 % (ref 0.3–6.2)
ERYTHROCYTE [DISTWIDTH] IN BLOOD BY AUTOMATED COUNT: 12.7 % (ref 12.3–15.4)
GLUCOSE SERPL-MCNC: 90 MG/DL (ref 65–99)
HCT VFR BLD AUTO: 45.7 % (ref 37.5–51)
HGB BLD-MCNC: 15.1 G/DL (ref 13–17.7)
IMM GRANULOCYTES # BLD AUTO: 0.02 10*3/MM3 (ref 0–0.05)
IMM GRANULOCYTES NFR BLD AUTO: 0.3 % (ref 0–0.5)
LYMPHOCYTES # BLD AUTO: 1.74 10*3/MM3 (ref 0.7–3.1)
LYMPHOCYTES NFR BLD AUTO: 23 % (ref 19.6–45.3)
MCH RBC QN AUTO: 30.9 PG (ref 26.6–33)
MCHC RBC AUTO-ENTMCNC: 33 G/DL (ref 31.5–35.7)
MCV RBC AUTO: 93.6 FL (ref 79–97)
MONOCYTES # BLD AUTO: 0.86 10*3/MM3 (ref 0.1–0.9)
MONOCYTES NFR BLD AUTO: 11.4 % (ref 5–12)
NEUTROPHILS NFR BLD AUTO: 4.28 10*3/MM3 (ref 1.7–7)
NEUTROPHILS NFR BLD AUTO: 56.5 % (ref 42.7–76)
NRBC BLD AUTO-RTO: 0 /100 WBC (ref 0–0.2)
PLATELET # BLD AUTO: 233 10*3/MM3 (ref 140–450)
PMV BLD AUTO: 11.4 FL (ref 6–12)
POTASSIUM SERPL-SCNC: 4.4 MMOL/L (ref 3.5–5.2)
RBC # BLD AUTO: 4.88 10*6/MM3 (ref 4.14–5.8)
SODIUM SERPL-SCNC: 137 MMOL/L (ref 136–145)
WBC NRBC COR # BLD AUTO: 7.56 10*3/MM3 (ref 3.4–10.8)

## 2024-06-11 PROCEDURE — 36415 COLL VENOUS BLD VENIPUNCTURE: CPT

## 2024-06-11 PROCEDURE — 80048 BASIC METABOLIC PNL TOTAL CA: CPT

## 2024-06-11 PROCEDURE — 85025 COMPLETE CBC W/AUTO DIFF WBC: CPT

## 2024-06-11 NOTE — TELEPHONE ENCOUNTER
Called and spoke with him after reviewing message with Dr. Zamudio    Increase in AF episode and currently in AF now for about a week---overall HR is okay.    He is symptomatic and had discussed ablation before but then AF decreased so held off.     Discussed ablation again and he is agreeable.     He is on apixaban and has not missed any doses.     He does not have any upcoming trips until Nov so now is a good time to do procedure.    Will place order

## 2024-06-11 NOTE — TELEPHONE ENCOUNTER
"----- Message from Maryellen CHA sent at 6/11/2024  8:29 AM EDT -----  Regarding: FW: AFib  Contact: [phone]  Please see attached pt message...Maryellen  ----- Message -----  From: Gary Grullon \"Ed\"  Sent: 6/10/2024   7:43 PM EDT  To: Celine Menendez [facility]  Subject: AFib                                             Angie –   Since we last corresponded last July (see above note) I have gone back into AFiba total of three times.  The first two times I was warned by my Apple Watch.  I had elevated HR along with the AFib.  I followed the same procedure of 2 50MG of  at first realization of the condition followed by 1 50MG every 12 hours.  The first instance it got me back to normal sinus rhythm within 36 hours along with the normal HR 40 – 60 resting.  The second time following the same routine it took about 48 – 60 hours before I returned to sinus rhythm.  A week from yesterday – June 2 – I had a long busy day.  I power washed my large deck and then went out and played almost 2 hours of hard tennis.  I noticed I was much more tired than normal.  That evening when I went back home - my watch told me I was in AFib.  I started the same routine with Metoprolol Sacconate XL.  I also noticed significantly longer periods of light-headedness.   This time the drugs have not yet reversed my back into sinus rhythm.  I played pickle on Tuesday, two hours of tennis on Thursday and two hours on Saturday.  I am being more cautious now.  I cancelled my Monday tennis and my Tuesday Pickle game this week so far.  I also am feeling a bit dizzy doing any yard work.  I am also getting low on the Metoprolol Sacconate XL tablets.  I need to speak with you or one of the doctors to determine the best way ahead.  I’d appreciate hearing from you soon.   Dino Grullon [phone]  "

## 2024-06-13 ENCOUNTER — ANESTHESIA EVENT (OUTPATIENT)
Dept: CARDIOLOGY | Facility: HOSPITAL | Age: 79
End: 2024-06-13
Payer: MEDICARE

## 2024-06-13 ENCOUNTER — HOSPITAL ENCOUNTER (OUTPATIENT)
Facility: HOSPITAL | Age: 79
Discharge: HOME | End: 2024-06-14
Attending: INTERNAL MEDICINE | Admitting: INTERNAL MEDICINE
Payer: MEDICARE

## 2024-06-13 ENCOUNTER — APPOINTMENT (OUTPATIENT)
Dept: CARDIOLOGY | Facility: HOSPITAL | Age: 79
End: 2024-06-13
Payer: MEDICARE

## 2024-06-13 ENCOUNTER — ANESTHESIA (OUTPATIENT)
Dept: CARDIOLOGY | Facility: HOSPITAL | Age: 79
End: 2024-06-13
Payer: MEDICARE

## 2024-06-13 DIAGNOSIS — I48.0 PAROXYSMAL ATRIAL FIBRILLATION: ICD-10-CM

## 2024-06-13 PROBLEM — I47.10 SVT (SUPRAVENTRICULAR TACHYCARDIA): Status: ACTIVE | Noted: 2024-06-13

## 2024-06-13 LAB
ACT BLD: 287 SECONDS (ref 82–152)
ACT BLD: 324 SECONDS (ref 82–152)
GLUCOSE BLDC GLUCOMTR-MCNC: 134 MG/DL (ref 70–130)
QT INTERVAL: 332 MS
QT INTERVAL: 435 MS
QTC INTERVAL: 397 MS
QTC INTERVAL: 483 MS

## 2024-06-13 PROCEDURE — S0260 H&P FOR SURGERY: HCPCS | Performed by: INTERNAL MEDICINE

## 2024-06-13 PROCEDURE — 93010 ELECTROCARDIOGRAM REPORT: CPT | Performed by: INTERNAL MEDICINE

## 2024-06-13 PROCEDURE — 25010000002 DOPAMINE PER 40 MG: Performed by: INTERNAL MEDICINE

## 2024-06-13 PROCEDURE — G0378 HOSPITAL OBSERVATION PER HR: HCPCS

## 2024-06-13 PROCEDURE — A9270 NON-COVERED ITEM OR SERVICE: HCPCS | Performed by: PHYSICIAN ASSISTANT

## 2024-06-13 PROCEDURE — 25010000002 FENTANYL CITRATE (PF) 50 MCG/ML SOLUTION: Performed by: ANESTHESIOLOGY

## 2024-06-13 PROCEDURE — C1732 CATH, EP, DIAG/ABL, 3D/VECT: HCPCS | Performed by: INTERNAL MEDICINE

## 2024-06-13 PROCEDURE — 93321 DOPPLER ECHO F-UP/LMTD STD: CPT | Performed by: INTERNAL MEDICINE

## 2024-06-13 PROCEDURE — 93005 ELECTROCARDIOGRAM TRACING: CPT | Performed by: PHYSICIAN ASSISTANT

## 2024-06-13 PROCEDURE — 93325 DOPPLER ECHO COLOR FLOW MAPG: CPT

## 2024-06-13 PROCEDURE — 63710000001 APIXABAN 5 MG TABLET: Performed by: PHYSICIAN ASSISTANT

## 2024-06-13 PROCEDURE — C1894 INTRO/SHEATH, NON-LASER: HCPCS | Performed by: INTERNAL MEDICINE

## 2024-06-13 PROCEDURE — C1759 CATH, INTRA ECHOCARDIOGRAPHY: HCPCS | Performed by: INTERNAL MEDICINE

## 2024-06-13 PROCEDURE — 25010000002 PROTAMINE SULFATE PER 10 MG: Performed by: INTERNAL MEDICINE

## 2024-06-13 PROCEDURE — 93321 DOPPLER ECHO F-UP/LMTD STD: CPT

## 2024-06-13 PROCEDURE — 93005 ELECTROCARDIOGRAM TRACING: CPT | Performed by: INTERNAL MEDICINE

## 2024-06-13 PROCEDURE — 25010000002 MIDAZOLAM PER 1 MG: Performed by: ANESTHESIOLOGY

## 2024-06-13 PROCEDURE — 25010000002 HEPARIN (PORCINE) PER 1000 UNITS: Performed by: ANESTHESIOLOGY

## 2024-06-13 PROCEDURE — 25010000002 ATROPINE SULFATE: Performed by: INTERNAL MEDICINE

## 2024-06-13 PROCEDURE — 85347 COAGULATION TIME ACTIVATED: CPT

## 2024-06-13 PROCEDURE — 25010000002 PHENYLEPHRINE 10 MG/ML SOLUTION 5 ML VIAL: Performed by: ANESTHESIOLOGY

## 2024-06-13 PROCEDURE — 25010000002 PROPOFOL 10 MG/ML EMULSION: Performed by: ANESTHESIOLOGY

## 2024-06-13 PROCEDURE — 25010000002 PHENYLEPHRINE 10 MG/ML SOLUTION: Performed by: ANESTHESIOLOGY

## 2024-06-13 PROCEDURE — 92960 CARDIOVERSION ELECTRIC EXT: CPT | Performed by: INTERNAL MEDICINE

## 2024-06-13 PROCEDURE — C1893 INTRO/SHEATH, FIXED,NON-PEEL: HCPCS | Performed by: INTERNAL MEDICINE

## 2024-06-13 PROCEDURE — 82948 REAGENT STRIP/BLOOD GLUCOSE: CPT

## 2024-06-13 PROCEDURE — 93308 TTE F-UP OR LMTD: CPT

## 2024-06-13 PROCEDURE — 93325 DOPPLER ECHO COLOR FLOW MAPG: CPT | Performed by: INTERNAL MEDICINE

## 2024-06-13 PROCEDURE — 25010000002 ONDANSETRON PER 1 MG: Performed by: ANESTHESIOLOGY

## 2024-06-13 PROCEDURE — 93656 COMPRE EP EVAL ABLTJ ATR FIB: CPT | Performed by: INTERNAL MEDICINE

## 2024-06-13 PROCEDURE — 93308 TTE F-UP OR LMTD: CPT | Performed by: INTERNAL MEDICINE

## 2024-06-13 PROCEDURE — 25010000002 HEPARIN (PORCINE) PER 1000 UNITS: Performed by: INTERNAL MEDICINE

## 2024-06-13 PROCEDURE — 63710000001 ATORVASTATIN 20 MG TABLET: Performed by: PHYSICIAN ASSISTANT

## 2024-06-13 PROCEDURE — 25810000003 SODIUM CHLORIDE 0.9 % SOLUTION 250 ML FLEX CONT: Performed by: ANESTHESIOLOGY

## 2024-06-13 PROCEDURE — 25810000003 LACTATED RINGERS PER 1000 ML: Performed by: ANESTHESIOLOGY

## 2024-06-13 PROCEDURE — C1730 CATH, EP, 19 OR FEW ELECT: HCPCS | Performed by: INTERNAL MEDICINE

## 2024-06-13 PROCEDURE — 25810000003 SODIUM CHLORIDE 0.9 % SOLUTION: Performed by: INTERNAL MEDICINE

## 2024-06-13 RX ORDER — MIDAZOLAM HYDROCHLORIDE 1 MG/ML
0.5 INJECTION INTRAMUSCULAR; INTRAVENOUS
Status: DISCONTINUED | OUTPATIENT
Start: 2024-06-13 | End: 2024-06-13

## 2024-06-13 RX ORDER — HYDROMORPHONE HYDROCHLORIDE 1 MG/ML
0.25 INJECTION, SOLUTION INTRAMUSCULAR; INTRAVENOUS; SUBCUTANEOUS
Status: DISCONTINUED | OUTPATIENT
Start: 2024-06-13 | End: 2024-06-13

## 2024-06-13 RX ORDER — SODIUM CHLORIDE 0.9 % (FLUSH) 0.9 %
10 SYRINGE (ML) INJECTION AS NEEDED
Status: DISCONTINUED | OUTPATIENT
Start: 2024-06-13 | End: 2024-06-14 | Stop reason: HOSPADM

## 2024-06-13 RX ORDER — SODIUM CHLORIDE, SODIUM LACTATE, POTASSIUM CHLORIDE, CALCIUM CHLORIDE 600; 310; 30; 20 MG/100ML; MG/100ML; MG/100ML; MG/100ML
9 INJECTION, SOLUTION INTRAVENOUS CONTINUOUS
Status: DISCONTINUED | OUTPATIENT
Start: 2024-06-13 | End: 2024-06-14 | Stop reason: HOSPADM

## 2024-06-13 RX ORDER — DIPHENHYDRAMINE HYDROCHLORIDE 50 MG/ML
12.5 INJECTION INTRAMUSCULAR; INTRAVENOUS
Status: DISCONTINUED | OUTPATIENT
Start: 2024-06-13 | End: 2024-06-13

## 2024-06-13 RX ORDER — NALOXONE HCL 0.4 MG/ML
0.2 VIAL (ML) INJECTION AS NEEDED
Status: DISCONTINUED | OUTPATIENT
Start: 2024-06-13 | End: 2024-06-13

## 2024-06-13 RX ORDER — FENTANYL CITRATE 50 UG/ML
50 INJECTION, SOLUTION INTRAMUSCULAR; INTRAVENOUS ONCE AS NEEDED
Status: COMPLETED | OUTPATIENT
Start: 2024-06-13 | End: 2024-06-13

## 2024-06-13 RX ORDER — HYDROCODONE BITARTRATE AND ACETAMINOPHEN 5; 325 MG/1; MG/1
1 TABLET ORAL ONCE AS NEEDED
Status: DISCONTINUED | OUTPATIENT
Start: 2024-06-13 | End: 2024-06-13

## 2024-06-13 RX ORDER — SODIUM CHLORIDE 0.9 % (FLUSH) 0.9 %
10 SYRINGE (ML) INJECTION AS NEEDED
Status: DISCONTINUED | OUTPATIENT
Start: 2024-06-13 | End: 2024-06-13

## 2024-06-13 RX ORDER — PROTAMINE SULFATE 10 MG/ML
INJECTION, SOLUTION INTRAVENOUS
Status: DISCONTINUED | OUTPATIENT
Start: 2024-06-13 | End: 2024-06-13 | Stop reason: HOSPADM

## 2024-06-13 RX ORDER — SODIUM CHLORIDE 0.9 % (FLUSH) 0.9 %
3 SYRINGE (ML) INJECTION EVERY 12 HOURS SCHEDULED
Status: DISCONTINUED | OUTPATIENT
Start: 2024-06-13 | End: 2024-06-13

## 2024-06-13 RX ORDER — HEPARIN SODIUM 1000 [USP'U]/ML
INJECTION, SOLUTION INTRAVENOUS; SUBCUTANEOUS AS NEEDED
Status: DISCONTINUED | OUTPATIENT
Start: 2024-06-13 | End: 2024-06-13

## 2024-06-13 RX ORDER — LIDOCAINE HYDROCHLORIDE 20 MG/ML
INJECTION, SOLUTION INFILTRATION; PERINEURAL AS NEEDED
Status: DISCONTINUED | OUTPATIENT
Start: 2024-06-13 | End: 2024-06-13 | Stop reason: SURG

## 2024-06-13 RX ORDER — ONDANSETRON 2 MG/ML
INJECTION INTRAMUSCULAR; INTRAVENOUS AS NEEDED
Status: DISCONTINUED | OUTPATIENT
Start: 2024-06-13 | End: 2024-06-13 | Stop reason: SURG

## 2024-06-13 RX ORDER — DOPAMINE HYDROCHLORIDE 160 MG/100ML
5 INJECTION, SOLUTION INTRAVENOUS
Status: DISCONTINUED | OUTPATIENT
Start: 2024-06-13 | End: 2024-06-14 | Stop reason: HOSPADM

## 2024-06-13 RX ORDER — EPHEDRINE SULFATE 50 MG/ML
5 INJECTION, SOLUTION INTRAVENOUS ONCE AS NEEDED
Status: DISCONTINUED | OUTPATIENT
Start: 2024-06-13 | End: 2024-06-13

## 2024-06-13 RX ORDER — ONDANSETRON 2 MG/ML
4 INJECTION INTRAMUSCULAR; INTRAVENOUS ONCE AS NEEDED
Status: DISCONTINUED | OUTPATIENT
Start: 2024-06-13 | End: 2024-06-13

## 2024-06-13 RX ORDER — PROMETHAZINE HYDROCHLORIDE 25 MG/1
25 TABLET ORAL ONCE AS NEEDED
Status: DISCONTINUED | OUTPATIENT
Start: 2024-06-13 | End: 2024-06-13

## 2024-06-13 RX ORDER — PROPOFOL 10 MG/ML
VIAL (ML) INTRAVENOUS AS NEEDED
Status: DISCONTINUED | OUTPATIENT
Start: 2024-06-13 | End: 2024-06-13 | Stop reason: SURG

## 2024-06-13 RX ORDER — HYDROCODONE BITARTRATE AND ACETAMINOPHEN 7.5; 325 MG/1; MG/1
1 TABLET ORAL EVERY 4 HOURS PRN
Status: DISCONTINUED | OUTPATIENT
Start: 2024-06-13 | End: 2024-06-13

## 2024-06-13 RX ORDER — SODIUM CHLORIDE 0.9 % (FLUSH) 0.9 %
3-10 SYRINGE (ML) INJECTION AS NEEDED
Status: DISCONTINUED | OUTPATIENT
Start: 2024-06-13 | End: 2024-06-13

## 2024-06-13 RX ORDER — FLUMAZENIL 0.1 MG/ML
0.2 INJECTION INTRAVENOUS AS NEEDED
Status: DISCONTINUED | OUTPATIENT
Start: 2024-06-13 | End: 2024-06-13

## 2024-06-13 RX ORDER — HYDRALAZINE HYDROCHLORIDE 20 MG/ML
5 INJECTION INTRAMUSCULAR; INTRAVENOUS
Status: DISCONTINUED | OUTPATIENT
Start: 2024-06-13 | End: 2024-06-13

## 2024-06-13 RX ORDER — HEPARIN SODIUM 1000 [USP'U]/ML
INJECTION, SOLUTION INTRAVENOUS; SUBCUTANEOUS
Status: DISCONTINUED | OUTPATIENT
Start: 2024-06-13 | End: 2024-06-13 | Stop reason: HOSPADM

## 2024-06-13 RX ORDER — NITROGLYCERIN 0.4 MG/1
0.4 TABLET SUBLINGUAL
Status: DISCONTINUED | OUTPATIENT
Start: 2024-06-13 | End: 2024-06-13

## 2024-06-13 RX ORDER — ATORVASTATIN CALCIUM 20 MG/1
20 TABLET, FILM COATED ORAL NIGHTLY
Status: DISCONTINUED | OUTPATIENT
Start: 2024-06-13 | End: 2024-06-14 | Stop reason: HOSPADM

## 2024-06-13 RX ORDER — LEVOTHYROXINE SODIUM 0.05 MG/1
50 TABLET ORAL
Status: DISCONTINUED | OUTPATIENT
Start: 2024-06-14 | End: 2024-06-14 | Stop reason: HOSPADM

## 2024-06-13 RX ORDER — ROCURONIUM BROMIDE 10 MG/ML
INJECTION, SOLUTION INTRAVENOUS AS NEEDED
Status: DISCONTINUED | OUTPATIENT
Start: 2024-06-13 | End: 2024-06-13 | Stop reason: SURG

## 2024-06-13 RX ORDER — SODIUM CHLORIDE 9 MG/ML
75 INJECTION, SOLUTION INTRAVENOUS CONTINUOUS
Status: DISCONTINUED | OUTPATIENT
Start: 2024-06-13 | End: 2024-06-14 | Stop reason: HOSPADM

## 2024-06-13 RX ORDER — SODIUM CHLORIDE 0.9 % (FLUSH) 0.9 %
10 SYRINGE (ML) INJECTION EVERY 12 HOURS SCHEDULED
Status: DISCONTINUED | OUTPATIENT
Start: 2024-06-13 | End: 2024-06-13

## 2024-06-13 RX ORDER — ONDANSETRON 2 MG/ML
4 INJECTION INTRAMUSCULAR; INTRAVENOUS EVERY 6 HOURS PRN
Status: DISCONTINUED | OUTPATIENT
Start: 2024-06-13 | End: 2024-06-14 | Stop reason: HOSPADM

## 2024-06-13 RX ORDER — PHENYLEPHRINE HYDROCHLORIDE 10 MG/ML
INJECTION INTRAVENOUS AS NEEDED
Status: DISCONTINUED | OUTPATIENT
Start: 2024-06-13 | End: 2024-06-13 | Stop reason: SURG

## 2024-06-13 RX ORDER — DROPERIDOL 2.5 MG/ML
0.62 INJECTION, SOLUTION INTRAMUSCULAR; INTRAVENOUS
Status: DISCONTINUED | OUTPATIENT
Start: 2024-06-13 | End: 2024-06-13

## 2024-06-13 RX ORDER — PROMETHAZINE HYDROCHLORIDE 25 MG/1
25 SUPPOSITORY RECTAL ONCE AS NEEDED
Status: DISCONTINUED | OUTPATIENT
Start: 2024-06-13 | End: 2024-06-13

## 2024-06-13 RX ORDER — HEPARIN SODIUM 1000 [USP'U]/ML
INJECTION, SOLUTION INTRAVENOUS; SUBCUTANEOUS AS NEEDED
Status: DISCONTINUED | OUTPATIENT
Start: 2024-06-13 | End: 2024-06-13 | Stop reason: SURG

## 2024-06-13 RX ORDER — IPRATROPIUM BROMIDE AND ALBUTEROL SULFATE 2.5; .5 MG/3ML; MG/3ML
3 SOLUTION RESPIRATORY (INHALATION) ONCE AS NEEDED
Status: DISCONTINUED | OUTPATIENT
Start: 2024-06-13 | End: 2024-06-13

## 2024-06-13 RX ORDER — LABETALOL HYDROCHLORIDE 5 MG/ML
5 INJECTION, SOLUTION INTRAVENOUS
Status: DISCONTINUED | OUTPATIENT
Start: 2024-06-13 | End: 2024-06-13

## 2024-06-13 RX ORDER — FENTANYL CITRATE 50 UG/ML
25 INJECTION, SOLUTION INTRAMUSCULAR; INTRAVENOUS
Status: DISCONTINUED | OUTPATIENT
Start: 2024-06-13 | End: 2024-06-13

## 2024-06-13 RX ORDER — ACETAMINOPHEN 325 MG/1
650 TABLET ORAL EVERY 4 HOURS PRN
Status: DISCONTINUED | OUTPATIENT
Start: 2024-06-13 | End: 2024-06-14 | Stop reason: HOSPADM

## 2024-06-13 RX ORDER — SODIUM CHLORIDE 9 MG/ML
40 INJECTION, SOLUTION INTRAVENOUS AS NEEDED
Status: DISCONTINUED | OUTPATIENT
Start: 2024-06-13 | End: 2024-06-14 | Stop reason: HOSPADM

## 2024-06-13 RX ORDER — SODIUM CHLORIDE 0.9 % (FLUSH) 0.9 %
10 SYRINGE (ML) INJECTION EVERY 12 HOURS SCHEDULED
Status: DISCONTINUED | OUTPATIENT
Start: 2024-06-13 | End: 2024-06-14 | Stop reason: HOSPADM

## 2024-06-13 RX ORDER — LIDOCAINE HYDROCHLORIDE 10 MG/ML
0.5 INJECTION, SOLUTION INFILTRATION; PERINEURAL ONCE AS NEEDED
Status: DISCONTINUED | OUTPATIENT
Start: 2024-06-13 | End: 2024-06-13

## 2024-06-13 RX ORDER — ACETAMINOPHEN 650 MG/1
650 SUPPOSITORY RECTAL EVERY 4 HOURS PRN
Status: DISCONTINUED | OUTPATIENT
Start: 2024-06-13 | End: 2024-06-14 | Stop reason: HOSPADM

## 2024-06-13 RX ORDER — FAMOTIDINE 10 MG/ML
20 INJECTION, SOLUTION INTRAVENOUS ONCE
Status: COMPLETED | OUTPATIENT
Start: 2024-06-13 | End: 2024-06-13

## 2024-06-13 RX ADMIN — APIXABAN 5 MG: 5 TABLET, FILM COATED ORAL at 20:01

## 2024-06-13 RX ADMIN — SODIUM CHLORIDE, POTASSIUM CHLORIDE, SODIUM LACTATE AND CALCIUM CHLORIDE: 600; 310; 30; 20 INJECTION, SOLUTION INTRAVENOUS at 08:31

## 2024-06-13 RX ADMIN — HEPARIN SODIUM 6000 UNITS: 1000 INJECTION, SOLUTION INTRAVENOUS; SUBCUTANEOUS at 09:11

## 2024-06-13 RX ADMIN — PHENYLEPHRINE HYDROCHLORIDE 200 MCG: 10 INJECTION INTRAVENOUS at 08:41

## 2024-06-13 RX ADMIN — DOPAMINE HYDROCHLORIDE 5 MCG/KG/MIN: 160 INJECTION, SOLUTION INTRAVENOUS at 11:22

## 2024-06-13 RX ADMIN — ROCURONIUM BROMIDE 50 MG: 10 INJECTION, SOLUTION INTRAVENOUS at 08:40

## 2024-06-13 RX ADMIN — Medication 10 ML: at 14:34

## 2024-06-13 RX ADMIN — PHENYLEPHRINE HYDROCHLORIDE 400 MCG: 10 INJECTION INTRAVENOUS at 09:12

## 2024-06-13 RX ADMIN — ATROPINE SULFATE 1 MG: 0.1 INJECTION INTRAVENOUS at 11:10

## 2024-06-13 RX ADMIN — PHENYLEPHRINE HYDROCHLORIDE 200 MCG: 10 INJECTION INTRAVENOUS at 08:52

## 2024-06-13 RX ADMIN — APIXABAN 5 MG: 5 TABLET, FILM COATED ORAL at 16:17

## 2024-06-13 RX ADMIN — FAMOTIDINE 20 MG: 10 INJECTION INTRAVENOUS at 08:01

## 2024-06-13 RX ADMIN — HEPARIN SODIUM 5000 UNITS: 1000 INJECTION, SOLUTION INTRAVENOUS; SUBCUTANEOUS at 09:05

## 2024-06-13 RX ADMIN — ATORVASTATIN CALCIUM 20 MG: 20 TABLET, FILM COATED ORAL at 20:01

## 2024-06-13 RX ADMIN — ONDANSETRON 4 MG: 2 INJECTION INTRAMUSCULAR; INTRAVENOUS at 10:11

## 2024-06-13 RX ADMIN — PROPOFOL 150 MG: 10 INJECTION, EMULSION INTRAVENOUS at 08:38

## 2024-06-13 RX ADMIN — FENTANYL CITRATE 25 MCG: 50 INJECTION, SOLUTION INTRAMUSCULAR; INTRAVENOUS at 10:16

## 2024-06-13 RX ADMIN — LIDOCAINE HYDROCHLORIDE 100 MG: 20 INJECTION, SOLUTION INFILTRATION; PERINEURAL at 08:37

## 2024-06-13 RX ADMIN — PHENYLEPHRINE HYDROCHLORIDE 1 MCG/KG/MIN: 10 INJECTION, SOLUTION INTRAVENOUS at 09:21

## 2024-06-13 RX ADMIN — FENTANYL CITRATE 25 MCG: 50 INJECTION, SOLUTION INTRAMUSCULAR; INTRAVENOUS at 10:10

## 2024-06-13 RX ADMIN — MIDAZOLAM 0.5 MG: 1 INJECTION INTRAMUSCULAR; INTRAVENOUS at 08:01

## 2024-06-13 RX ADMIN — SODIUM CHLORIDE 75 ML/HR: 9 INJECTION, SOLUTION INTRAVENOUS at 07:36

## 2024-06-13 RX ADMIN — HEPARIN SODIUM 2000 UNITS: 1000 INJECTION, SOLUTION INTRAVENOUS; SUBCUTANEOUS at 09:09

## 2024-06-13 RX ADMIN — PHENYLEPHRINE HYDROCHLORIDE 100 MCG: 10 INJECTION INTRAVENOUS at 11:17

## 2024-06-13 RX ADMIN — PHENYLEPHRINE HYDROCHLORIDE 200 MCG: 10 INJECTION INTRAVENOUS at 11:14

## 2024-06-13 RX ADMIN — Medication 10 ML: at 20:28

## 2024-06-13 NOTE — CASE MANAGEMENT/SOCIAL WORK
Discharge Planning Assessment  [facility]     Patient Name: Gary Grullon  MRN: [id number]  Today's Date: 6/13/2024    Admit Date: 6/13/2024    Plan: Home with family   Discharge Needs Assessment       Row Name 06/13/24 1515       Living Environment    People in Home spouse    Name(s) of People in Home Wife Elle    Current Living Arrangements home    Potentially Unsafe Housing Conditions none    Primary Care Provided by self    Family Caregiver if Needed spouse;child(eula), adult    Quality of Family Relationships involved;helpful    Able to Return to Prior Arrangements yes       Resource/Environmental Concerns    Resource/Environmental Concerns home accessibility    Home Accessibility Concerns stairs to access bedroom or bathroom       Transition Planning    Patient/Family Anticipates Transition to home with family    Patient/Family Anticipated Services at Transition none    Transportation Anticipated family or friend will provide       Discharge Needs Assessment    Readmission Within the Last 30 Days no previous admission in last 30 days    Equipment Currently Used at Home cpap    Concerns to be Addressed no discharge needs identified    Anticipated Changes Related to Illness none    Equipment Needed After Discharge none                   Discharge Plan       Row Name 06/13/24 1516       Plan    Plan Home with family    Patient/Family in Agreement with Plan yes    Plan Comments Spoke to pt at bedside, introduced self and explained CCP role, face sheet and pharmacy information verified. Pt lives with his wife Elle in multi-level home with 6 steps to enter and 1 flight to bedroom, pt is IADL's, uses cpap, has no HH or SNF history, he plans home with family to transport and denies d/c needs. CCP will follow - Dina GEORGEMaco                  Continued Care and Services - Admitted Since 6/13/2024    No active coordination exists for this encounter.       Expected Discharge Date and Time       Expected Discharge Date  Expected Discharge Time    Jun 14, 2024            Demographic Summary       Row Name 06/13/24 1515       General Information    Admission Type observation                   Functional Status       Row Name 06/13/24 1515       Functional Status    Usual Activity Tolerance excellent    Current Activity Tolerance excellent       Assessment of Health Literacy    Health Literacy Excellent       Functional Status, IADL    Medications independent    Meal Preparation independent    Housekeeping independent    Laundry independent    Shopping independent       Mental Status    General Appearance WDL WDL       Mental Status Summary    Recent Changes in Mental Status/Cognitive Functioning no changes                   Psychosocial    No documentation.                  Abuse/Neglect    No documentation.                  Legal       Row Name 06/13/24 1515       Financial/Legal    Who Manages Finances if Patient Unable wife                   Substance Abuse    No documentation.                  Patient Forms    No documentation.                     Dina Rizvi RN

## 2024-06-13 NOTE — Clinical Note
Heels intact and elevated on padding to protect. Cacoon warming system in use. Clearsite BP system in use. Pulses 2/1 bilat.

## 2024-06-13 NOTE — ANESTHESIA PROCEDURE NOTES
Airway  Urgency: elective    Date/Time: 6/13/2024 8:39 AM    General Information and Staff    Patient location during procedure: OR  Anesthesiologist: Aranza Briscoe MD    Indications and Patient Condition  Indications for airway management: airway protection    Preoxygenated: yes  Mask difficulty assessment: 1 - vent by mask    Final Airway Details  Final airway type: endotracheal airway      Successful airway: ETT  Cuffed: yes   Successful intubation technique: direct laryngoscopy  Facilitating devices/methods: cricoid pressure  Blade: Jluis  Blade size: 3  ETT size (mm): 7.5  Cormack-Lehane Classification: grade I - full view of glottis  Placement verified by: capnometry   Measured from: teeth  Number of attempts at approach: 1  Assessment: lips, teeth, and gum same as pre-op and atraumatic intubation

## 2024-06-13 NOTE — Clinical Note
Hemostasis started on the right femoral vein. Figure 8 suturing was used in achieving hemostasis. Closure device deployed in the vessel. Hemostasis achieved successfully. Closure device additional comment: Sheaths removed by MD with figure of 8 stitch and stopcock bolster.

## 2024-06-13 NOTE — PERIOPERATIVE NURSING NOTE
Dr. Zamudio and Dr. Briscoe, AA at bedside.  Echo at bedside.  Atropine per order, and Chad IV per AA given.

## 2024-06-13 NOTE — PLAN OF CARE
Goal Outcome Evaluation:  Plan of Care Reviewed With: patient        Progress: improving  Outcome Evaluation: S/p a fib ablation, groin sites c/d/s. No c/o pain. NSR- SB on monitor in 50-60s, room air, last bp 106/80. Plan to go home in AM. No further complaints.

## 2024-06-13 NOTE — DISCHARGE INSTRUCTIONS
[facility]  Cardiology  [address] [place] [address]  [phone]    Coronary Ablation After Care    Refer to this sheet in the next few weeks. These instructions provide you with information on caring for yourself after your procedure. Your health care provider may also give you more specific instructions. Your treatment has been planned according to current medical practices, but problems sometimes occur. Call your health care provider if you have any problems or questions after your procedure.      What to Expect After the Procedure:  After your procedure, it is typical to have the following sensations:  Minor discomfort or tenderness and a small bump at the catheter insertion site(s). The bump(s) should usually decrease in size and tenderness within 1 to 2 weeks.  Any bruising will usually fade within 2 to 4 weeks.  Home Care Instructions:  Do not apply powder or lotion to the site.  Do not take baths, swim, or use a hot tub until your health care provider approves and the site is completely healed.  Do not bend, squat, or lift anything over 20 lb (9 kg) or as directed by your health care provider. However, we recommend lifting nothing heavier than a gallon of milk.    You may shower 24 hours after the procedure. Remove the bandage (dressing) and gently wash the site with plain soap and water. Gently pat the site dry. You may apply a band aid daily for 2 days if desired.    Inspect the site at least twice daily.  Increase your fluid intake for the next 2 days.    Limit your activity for the first 48 hours.   Avoid strenuous activity for 1 week or as advised by your physician.    Follow instructions about when you can drive or return to work as directed by your physician.    Hold direct pressure over the site when you cough, sneeze, laugh or change positions.  Do this for the next 2 days.    Call Your Doctor If:  You have drainage (other than a small amount of blood on the dressing).  You  have chills or a fever > 101.  You have redness, warmth, swelling (larger than a walnut), or pain at the insertion   You develop palpitations, chest pain or shortness of breath, feel faint, or pass out.  You develop pain, discoloration, coldness, numbness, tingling, or severe bruising in the leg that held the catheter.  You develop bleeding from any other place, such as the bowels.  You have heavy bleeding from the site.  If this happens, hold pressure on the site and call 911.        Make Sure You:  Understand these instructions.  Will watch your condition.  Will get help right away if you are not doing well or get worse.

## 2024-06-13 NOTE — Clinical Note
Hemostasis started on the left femoral vein. Figure 8 suturing was used in achieving hemostasis. Closure device deployed in the vessel. Hemostasis achieved successfully. Closure device additional comment: Sheaths removed by MD with figure of 8 stitch and stopcock bolster.

## 2024-06-13 NOTE — ANESTHESIA PREPROCEDURE EVALUATION
Anesthesia Evaluation     no history of anesthetic complications:   NPO Solid Status: > 8 hours  NPO Liquid Status: > 2 hours           Airway   Mallampati: II  TM distance: >3 FB  Dental - normal exam     Pulmonary - normal exam   (+) a smoker,sleep apnea on CPAP  Cardiovascular     (+) hypertension, dysrhythmias Atrial Fib, hyperlipidemia    ROS comment: ECHO 2023    ·  Left ventricular systolic function is normal. Calculated left ventricular EF = 63.6%  ·  Left ventricular diastolic function is consistent with (grade II w/high LAP) pseudonormalization.  ·  Saline test results are negative.  ·  There is calcification of the aortic valve.  ·  Mild mitral valve regurgitation is present.  ·  Trace to mild tricuspid valve regurgitation is present  ·  Estimated right ventricular systolic pressure from tricuspid regurgitation is normal (<35 mmHg). Calculated right ventricular systolic pressure from tricuspid regurgitation is 30 mmHg.         Neuro/Psych  GI/Hepatic/Renal/Endo    (+) obesity, renal disease- CRI, thyroid problem   (-) morbid obesity    Musculoskeletal     Abdominal    Substance History      OB/GYN          Other                    Anesthesia Plan    ASA 3     general     intravenous induction     Anesthetic plan, risks, benefits, and alternatives have been provided, discussed and informed consent has been obtained with: patient.    CODE STATUS:

## 2024-06-14 ENCOUNTER — READMISSION MANAGEMENT (OUTPATIENT)
Dept: CALL CENTER | Facility: HOSPITAL | Age: 79
End: 2024-06-14
Payer: MEDICARE

## 2024-06-14 ENCOUNTER — TELEPHONE (OUTPATIENT)
Dept: INTERNAL MEDICINE | Facility: CLINIC | Age: 79
End: 2024-06-14
Payer: MEDICARE

## 2024-06-14 VITALS
OXYGEN SATURATION: 100 % | HEART RATE: 59 BPM | BODY MASS INDEX: 30.54 KG/M2 | DIASTOLIC BLOOD PRESSURE: 74 MMHG | RESPIRATION RATE: 16 BRPM | HEIGHT: 74 IN | TEMPERATURE: 98 F | SYSTOLIC BLOOD PRESSURE: 109 MMHG | WEIGHT: 238 LBS

## 2024-06-14 LAB
ANION GAP SERPL CALCULATED.3IONS-SCNC: 9.3 MMOL/L (ref 5–15)
BASOPHILS # BLD AUTO: 0.04 10*3/MM3 (ref 0–0.2)
BASOPHILS NFR BLD AUTO: 0.4 % (ref 0–1.5)
BUN SERPL-MCNC: 16 MG/DL (ref 8–23)
BUN/CREAT SERPL: 14.2 (ref 7–25)
CALCIUM SPEC-SCNC: 8.3 MG/DL (ref 8.6–10.5)
CHLORIDE SERPL-SCNC: 109 MMOL/L (ref 98–107)
CO2 SERPL-SCNC: 20.7 MMOL/L (ref 22–29)
CREAT SERPL-MCNC: 1.13 MG/DL (ref 0.76–1.27)
DEPRECATED RDW RBC AUTO: 42.3 FL (ref 37–54)
EGFRCR SERPLBLD CKD-EPI 2021: 66.9 ML/MIN/1.73
EOSINOPHIL # BLD AUTO: 0.33 10*3/MM3 (ref 0–0.4)
EOSINOPHIL NFR BLD AUTO: 3.7 % (ref 0.3–6.2)
ERYTHROCYTE [DISTWIDTH] IN BLOOD BY AUTOMATED COUNT: 12.3 % (ref 12.3–15.4)
GLUCOSE SERPL-MCNC: 111 MG/DL (ref 65–99)
HCT VFR BLD AUTO: 37.1 % (ref 37.5–51)
HGB BLD-MCNC: 12.2 G/DL (ref 13–17.7)
IMM GRANULOCYTES # BLD AUTO: 0.03 10*3/MM3 (ref 0–0.05)
IMM GRANULOCYTES NFR BLD AUTO: 0.3 % (ref 0–0.5)
LYMPHOCYTES # BLD AUTO: 1.34 10*3/MM3 (ref 0.7–3.1)
LYMPHOCYTES NFR BLD AUTO: 14.9 % (ref 19.6–45.3)
MCH RBC QN AUTO: 30.7 PG (ref 26.6–33)
MCHC RBC AUTO-ENTMCNC: 32.9 G/DL (ref 31.5–35.7)
MCV RBC AUTO: 93.2 FL (ref 79–97)
MONOCYTES # BLD AUTO: 0.98 10*3/MM3 (ref 0.1–0.9)
MONOCYTES NFR BLD AUTO: 10.9 % (ref 5–12)
NEUTROPHILS NFR BLD AUTO: 6.27 10*3/MM3 (ref 1.7–7)
NEUTROPHILS NFR BLD AUTO: 69.8 % (ref 42.7–76)
NRBC BLD AUTO-RTO: 0 /100 WBC (ref 0–0.2)
PLATELET # BLD AUTO: 160 10*3/MM3 (ref 140–450)
PMV BLD AUTO: 11 FL (ref 6–12)
POTASSIUM SERPL-SCNC: 4.3 MMOL/L (ref 3.5–5.2)
QT INTERVAL: 435 MS
QTC INTERVAL: 439 MS
RBC # BLD AUTO: 3.98 10*6/MM3 (ref 4.14–5.8)
SODIUM SERPL-SCNC: 139 MMOL/L (ref 136–145)
WBC NRBC COR # BLD AUTO: 8.99 10*3/MM3 (ref 3.4–10.8)

## 2024-06-14 PROCEDURE — 93005 ELECTROCARDIOGRAM TRACING: CPT | Performed by: PHYSICIAN ASSISTANT

## 2024-06-14 PROCEDURE — 93010 ELECTROCARDIOGRAM REPORT: CPT | Performed by: INTERNAL MEDICINE

## 2024-06-14 PROCEDURE — 85025 COMPLETE CBC W/AUTO DIFF WBC: CPT | Performed by: PHYSICIAN ASSISTANT

## 2024-06-14 PROCEDURE — A9270 NON-COVERED ITEM OR SERVICE: HCPCS | Performed by: PHYSICIAN ASSISTANT

## 2024-06-14 PROCEDURE — 63710000001 APIXABAN 5 MG TABLET: Performed by: PHYSICIAN ASSISTANT

## 2024-06-14 PROCEDURE — G0378 HOSPITAL OBSERVATION PER HR: HCPCS

## 2024-06-14 PROCEDURE — 63710000001 LEVOTHYROXINE 50 MCG TABLET: Performed by: PHYSICIAN ASSISTANT

## 2024-06-14 PROCEDURE — 80048 BASIC METABOLIC PNL TOTAL CA: CPT | Performed by: PHYSICIAN ASSISTANT

## 2024-06-14 PROCEDURE — 99238 HOSP IP/OBS DSCHRG MGMT 30/<: CPT | Performed by: NURSE PRACTITIONER

## 2024-06-14 RX ORDER — OMEPRAZOLE 20 MG/1
20 TABLET, DELAYED RELEASE ORAL DAILY
Start: 2024-06-14

## 2024-06-14 RX ADMIN — LEVOTHYROXINE SODIUM 50 MCG: 50 TABLET ORAL at 06:06

## 2024-06-14 RX ADMIN — APIXABAN 5 MG: 5 TABLET, FILM COATED ORAL at 08:07

## 2024-06-14 NOTE — DISCHARGE SUMMARY
DISCHARGE NOTE    Patient Name: Gary Grullon  Age/Sex: 78 y.o. male  : 1945  MRN: [id number]    Date of Discharge:  2024   Date of Admit: 2024  Encounter Provider: PAULA Smart  Place of Service: [facility] CARDIOLOGY  Patient Care Team:  Rommel Wilkerson MD as PCP - General (Internal Medicine)  Yash Soliz MD as Consulting Physician (Cardiology)    Subjective:     Discharge Diagnosis:    Paroxysmal atrial fibrillation    SVT (supraventricular tachycardia)      Hospital Course:     78 yr old who follows with Dr. Zamudio---PAF, treated with sotalol and did fairly well until recently, increase in episodes---had discussed ablation in the past but episodes had decreased so held off.     Recommended ablation.  PVI.     He had some issues with hypotension post procedure, treated with IVF's and dopamine gtt briefly, stat echo showed normal EF and no pericardial effusion.    Kept overnight to monitor. Has done well. Vital signs stable, has been up ambulating without any issues.     Maintaining SR.     DC home today, follow up in 4 weeks.     Vital Signs  Temp:  [96.8 °F (36 °C)-98.8 °F (37.1 °C)] 98 °F (36.7 °C)  Heart Rate:  [41-72] 59  Resp:  [16-18] 16  BP: ()/(46-98) 109/74    Intake/Output Summary (Last 24 hours) at 2024 0837  Last data filed at 2024 0800  Gross per 24 hour   Intake 1050 ml   Output --   Net 1050 ml       Physical Exam:    General Appearance: No acute distress, well developed and well nourished.    HENT: Atraumatic, normocephalic. External eyes, ears, and nose normal.   Respiratory: No signs of respiratory distress. Respiration rhythm and depth normal.   Clear to auscultation. No rales, crackles, rhonchi, or wheezing auscultated.   Cardiovascular:  Heart Rate and Rhythm: Normal, Heart Sounds: Normal S1 and S2. No  S3 or S4 noted.  Lower Extremities: No edema noted.  Musculoskeletal: Normal movement of extremities  Skin: Warm and dry.   Psychiatric: Patient alert and oriented to person, place, and time. Speech and behavior appropriate. Normal mood and affect.    Labs:   Results from last 7 days   Lab Units 06/14/24  0439 06/11/24  1546   SODIUM mmol/L 139 137   POTASSIUM mmol/L 4.3 4.4   CHLORIDE mmol/L 109* 105   CO2 mmol/L 20.7* 23.2   BUN mg/dL 16 18   CREATININE mg/dL 1.13 1.23   GLUCOSE mg/dL 111* 90   CALCIUM mg/dL 8.3* 9.6         Results from last 7 days   Lab Units 06/14/24  0439 06/11/24  1546   WBC 10*3/mm3 8.99 7.56   HEMOGLOBIN g/dL 12.2* 15.1   HEMATOCRIT % 37.1* 45.7   PLATELETS 10*3/mm3 160 233       Discharge Diet:  Regular      Activity at Discharge:  Instructions given to patient    Discharge Medications     Discharge Medications        New Medications        Instructions Start Date   omeprazole OTC 20 MG EC tablet  Commonly known as: PrilOSEC OTC   20 mg, Oral, Daily, Take for 1 month then stop             Continue These Medications        Instructions Start Date   atorvastatin 20 MG tablet  Commonly known as: LIPITOR   20 mg, Oral, Daily      cholecalciferol 25 MCG (1000 UT) tablet  Commonly known as: VITAMIN D3   1,000 Units, Oral, Daily      Eliquis 5 MG tablet tablet  Generic drug: apixaban   TAKE 1 TABLET EVERY 12 HOURS      levothyroxine 50 MCG tablet  Commonly known as: SYNTHROID, LEVOTHROID   50 mcg, Oral, Daily      metoprolol tartrate 25 MG tablet  Commonly known as: LOPRESSOR   25 mg, Oral, Daily PRN      NON FORMULARY   Ayers Revive Supplement      sotalol 120 MG tablet  Commonly known as: Betapace   60 mg, Oral, Daily      vitamin C 500 MG tablet  Commonly known as: ASCORBIC ACID   1,000 mg, Oral, Daily, HOLDING FOR DOS               Discharge disposition: home    Follow-up Appointments   Follow-up Information       Palomo Calabrese PA-C Follow up in 1 month(s).    Specialties: Physician  Assistant, Cardiology  Contact information:  [address] [facility]  [address]  [facility] [id number] [phone]               Rommel Wilkerson MD .    Specialty: Internal Medicine  Contact information:  [address] [facility] [address]  [facility] [id number] [phone]                           Future Appointments   Date Time Provider Department Center   8/13/2024  8:00 AM [facility]   8/20/2024  8:30 AM Rommel Wilkerson MD [facility]   9/9/2024  9:30 AM Palomo Calabrese PA-C [id number] None   1/9/2025  8:45 AM Justin Phipps MD [facility] None         Angie Duncan, APRN  06/14/24  08:37 EDT

## 2024-06-14 NOTE — OUTREACH NOTE
Prep Survey      Flowsheet Row Responses   [facility] patient discharged from? [place]   Is LACE score < 7 ? Yes   Eligibility [facility]   Date of Admission 06/13/24   Date of Discharge 06/14/24   Discharge Disposition Home or Self Care   Discharge diagnosis Paroxysmal atrial fibrillation   Does the patient have one of the following disease processes/diagnoses(primary or secondary)? Other   Prep survey completed? Yes            Ericka OWENS - Registered Nurse

## 2024-06-17 ENCOUNTER — TRANSITIONAL CARE MANAGEMENT TELEPHONE ENCOUNTER (OUTPATIENT)
Dept: CALL CENTER | Facility: HOSPITAL | Age: 79
End: 2024-06-17
Payer: MEDICARE

## 2024-06-17 NOTE — OUTREACH NOTE
Call Center TCM Note      Flowsheet Row Responses   [facility] patient discharged from? [place]   Does the patient have one of the following disease processes/diagnoses(primary or secondary)? Other   TCM attempt successful? Yes   Call start time 1224   Call end time 1229   Discharge diagnosis Paroxysmal atrial fibrillation   Meds reviewed with patient/caregiver? Yes   Is the patient having any side effects they believe may be caused by any medication additions or changes? No   Does the patient have all medications ordered at discharge? N/A   Is the patient taking all medications as directed (includes completed medication regime)? Yes   Does the patient have an appointment with their PCP within 7-14 days of discharge? No   Nursing Interventions Patient declined scheduling/rescheduling appointment at this time   Has home health visited the patient within 72 hours of discharge? N/A   Psychosocial issues? No   Comments Pt monitors BP and HR daily, HR has been low   Did the patient receive a copy of their discharge instructions? Yes   What is the patient's perception of their health status since discharge? Improving  [still has some episodes of lightheadedness]   Is the patient/caregiver able to teach back the hierarchy of who to call/visit for symptoms/problems? PCP, Specialist, Home health nurse, Urgent Care, ED, 911 Yes   TCM call completed? Yes   Call end time 1229   Would this patient benefit from a Referral to Amb Social Work? No   Is the patient interested in additional calls from an ambulatory ? No            Taty Bruner RN    6/17/2024, 12:29 EDT

## 2024-06-17 NOTE — H&P
78 y.o. male who has a cc of  PAf and we were going to do ablation but he got better.      He;s had more AF and will plan AF ablation today as we had previously discussed    Medical History        Past Medical History:   Diagnosis Date    Chronically dry eyes, bilateral 11/16/2023    COVID-19 01/2022    NAION (non-arteritic anterior ischemic optic neuropathy), 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    HEDY (obstructive sleep apnea)       AHI 15/h supine 49/h            Social History   Social History            Socioeconomic History    Marital status:    Tobacco Use    Smoking status: Never    Smokeless tobacco: Never    Tobacco comments:       Caffeine use    Vaping Use    Vaping Use: Never used   Substance and Sexual Activity    Alcohol use: Yes       Comment: 2 glasses of wine daily    Drug use: No    Sexual activity: Defer                  Family History   Problem Relation Age of Onset    COPD Mother      Hypertension Father      Diabetes Father      Stroke Father      Coronary artery disease Father      Kidney disease Sister      Diabetes Brother      Other Brother           coronary artery calcification    No Known Problems Maternal Grandmother      No Known Problems Maternal Grandfather      No Known Problems Paternal Grandmother      No Known Problems Paternal Grandfather      No Known Problems Maternal Aunt      No Known Problems Maternal Uncle      No Known Problems Paternal Aunt      No Known Problems Paternal Uncle      Anesthesia problems Neg Hx      Broken bones Neg Hx      Cancer Neg Hx      Clotting disorder Neg Hx      Collagen disease Neg Hx      Dislocations Neg Hx      Osteoporosis Neg Hx      Rheumatologic disease Neg Hx      Scoliosis Neg Hx      Severe sprains Neg Hx      Malig Hyperthermia Neg Hx           Review of Systems   Constitutional: Negative for fever and night sweats.   HENT:  Negative for ear pain and stridor.    Eyes:  Negative for discharge and visual halos.   Cardiovascular:  Negative for  "cyanosis.   Respiratory:  Negative for hemoptysis and sputum production.    Hematologic/Lymphatic: Negative for adenopathy.   Skin:  Negative for nail changes and unusual hair distribution.   Musculoskeletal:  Negative for gout and joint swelling.   Gastrointestinal:  Negative for bowel incontinence and flatus.   Genitourinary:  Negative for dysuria and flank pain.   Neurological:  Negative for seizures and tremors.   Psychiatric/Behavioral:  Negative for altered mental status. The patient is not nervous/anxious.                   Objective:      Vitals       Vitals:     02/28/24 0848   BP: 130/70   BP Location: Left arm   Patient Position: Sitting   Pulse: (!) 49   Weight: 110 kg (243 lb)   Height: 188 cm (74.02\")               Objective  Eyes:      General:         Right eye: No discharge.         Left eye: No discharge.   HENT:      Head: Normocephalic and atraumatic.   Neck:      Thyroid: No thyromegaly.      Vascular: No JVD.   Pulmonary:      Effort: Pulmonary effort is normal.      Breath sounds: Normal breath sounds. No rales.   Cardiovascular:      Bradycardia present. Occasional ectopic beats. Regular rhythm.      No gallop.    Edema:     Peripheral edema absent.   Abdominal:      General: Bowel sounds are normal.      Palpations: Abdomen is soft.      Tenderness: There is no abdominal tenderness.   Musculoskeletal: Normal range of motion.         General: No deformity. Skin:     General: Skin is warm and dry.      Findings: No erythema.   Neurological:      Mental Status: Alert and oriented to person, place, and time.      Motor: Normal muscle tone.   Psychiatric:         Behavior: Behavior normal.         Thought Content: Thought content normal.                        Lab Review:         Assessment:      Assessment    Diagnosis Plan   1. Paroxysmal atrial fibrillation          2. Tachycardia induced cardiomyopathy          3. Essential hypertension          4. PMR (polymyalgia rheumatica)                 "      Plan:    recurrent AF and will plan ablation     I went over risks again with him.

## 2024-06-19 ENCOUNTER — TELEPHONE (OUTPATIENT)
Age: 79
End: 2024-06-19
Payer: MEDICARE

## 2024-06-19 NOTE — TELEPHONE ENCOUNTER
"----- Message from Maryellen CHA sent at 6/19/2024  7:49 AM EDT -----  Regarding: FW: AFib  Contact: [phone]  Please see attached pt message...Maryellen  ----- Message -----  From: Gary Grullon \"Ed\"  Sent: 6/18/2024   5:21 PM EDT  To: [facility]  Subject: AFib                                             Chad Adams -  I have been going into and the out of AFib on average about twice a day since Saturday.  Sometimes it is pronounced enough for me to feel it in my chest, while other times my Apple Watch goes off and alerts me.  Most of the time the AFib last for 2 - 3 hours and goes back into Sinus Rhythm.  When I go into AFib the HR usually initially goes up to about 125 - 130 but then goes down to the 80 - 100 range.  Right now, for instance I have been in AFib since about 4:00 and my HR is just 72 (/71).  Once I have reverted back to Sinus Rhythm I have have a couple of alerts, while setting quietly watching television, from my watch alerting me to 5 minutes of HR under average of 40.    I don’t feel bad, but I am far from feeling back to normal.  I find myself light-headed more often than before the procedure.  When you get a chance I would really like to talk with you so that I can adjust my expectations.  My cell is [phone]  "

## 2024-06-24 NOTE — ANESTHESIA POSTPROCEDURE EVALUATION
Patient: Gary Grullon    Procedure Summary       Date: 06/13/24 Room / Location: [facility] /  [facility] CATH INVASIVE LOCATION    Anesthesia Start: 0830 Anesthesia Stop: 1043    Procedures:       Ablation atrial fibrillation      3D Mapping EP Diagnosis:       Paroxysmal atrial fibrillation      (PAF/persistent AF)    Providers: Nolan Zamudio MD Provider: Aranza Briscoe MD    Anesthesia Type: general ASA Status: 3            Anesthesia Type: general    Vitals  Vitals Value Taken Time   /72 06/13/24 1300   Temp 36 °C (96.8 °F) 06/13/24 1040   Pulse 58 06/13/24 1300   Resp 18 06/13/24 1300   SpO2 100 % 06/13/24 1300           Post Anesthesia Care and Evaluation    Anesthetic complications: No anesthetic complications

## 2024-07-17 ENCOUNTER — OFFICE VISIT (OUTPATIENT)
Age: 79
End: 2024-07-17
Payer: MEDICARE

## 2024-07-17 VITALS
DIASTOLIC BLOOD PRESSURE: 70 MMHG | BODY MASS INDEX: 30.67 KG/M2 | WEIGHT: 239 LBS | SYSTOLIC BLOOD PRESSURE: 116 MMHG | HEIGHT: 74 IN | HEART RATE: 45 BPM

## 2024-07-17 DIAGNOSIS — Z98.890 H/O CARDIAC RADIOFREQUENCY ABLATION: ICD-10-CM

## 2024-07-17 DIAGNOSIS — I48.0 PAROXYSMAL ATRIAL FIBRILLATION: Primary | Chronic | ICD-10-CM

## 2024-07-17 DIAGNOSIS — R42 ORTHOSTATIC DIZZINESS: ICD-10-CM

## 2024-07-17 RX ORDER — CYCLOSPORINE OPHTHALMIC SOLUTION 1 MG/ML
SOLUTION/ DROPS OPHTHALMIC
COMMUNITY
Start: 2024-02-21

## 2024-07-17 NOTE — PROGRESS NOTES
Date of Office Visit: 2024  Encounter Provider: PAULA Smart  Place of Service: [facility] CARDIOLOGY  Patient Name: Gary Grullon  :1945    Chief Complaint   Patient presents with    paroxysmal AFIB    s/p ablation    :     HPI: Gary Grullon is a 78 y.o. male who ho follows with Dr. Soliz and Dr. Zamudio--- hypertension, hyperlipidemia, polymyalgia rheumatica and PAF.     He was first diagnosed with PAF in 2020--- did well until 2022, had recurrent AF and a monitor that confirmed persistent AF, attempted cardioversion in February which was unsuccessful, his cardioversion had been delayed for short time due to he had tested positive for COVID.     2022 Echo--- showed severely decreased LV systolic function, EF 21%, grade 2 diastolic dysfunction with mild to moderate MR--- he was placed on amiodarone and GDMT and had a repeat cardioversion 2022 that was successful.     2022, seen for follow-up and had remained in sinus rhythm, metoprolol was reduced due to his heart rate being on the low side, he remained on amiodarone and apixaban at that time--- follow-up limited echo showed that his EF had normalized.     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     He saw Dr. Soliz in January of this year he was concerned that his visual issues may be a side effect of the amiodarone and this was discontinued.    Recurrence of PAF---2023 admitted for sotalol/CV--did okay for awhile, recurrence, ablation recommend. Initially on sotalol 120 BID, dose decreased to 60 mg BID due to low HR--but he was a runner in the past/marathons---resting HR while awake in the 50's in the past.      2024 PVI    Presents for follow up.     He is doing okay---for the first 3 weeks post ablation---had some PAF---has not had any in  the last week. He has not really done much activity, awaiting our okay.     He had no chest pain or shortness of breath.     He remains on apixaban for OAC.     He complains of brief dizziness when he bends at the waist and stands up quickly or if squats down for example to pull weeds or something and stands up and also is sitting for long time then stands up---we talked about orthostasis, orthostatic hypotension and measures to take to manage as well as compression stockings.      Past Medical History:   Diagnosis Date    Chronically dry eyes, bilateral 11/16/2023    COVID-19 01/2022    NAION (non-arteritic anterior ischemic optic neuropathy), 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    HEDY (obstructive sleep apnea)     AHI 15/h supine 49/h       Past Surgical History:   Procedure Laterality Date    CARDIAC ELECTROPHYSIOLOGY PROCEDURE N/A 6/13/2024    Procedure: Ablation atrial fibrillation;  Surgeon: Nolan Zamudio MD;  Location: [facility] INVASIVE LOCATION;  Service: Cardiovascular;  Laterality: N/A;    CATARACT EXTRACTION      COLONOSCOPY  04/04/2018    DR Gary Guerrero [facility]    CYST REMOVAL  1970    PILONIDAL CYST DRAINAGE      TEMPORAL ARTERY BIOPSY Bilateral 10/11/2022    Procedure: BILATERAL TEMPORAL ARTERY BIOPSY;  Surgeon: José Antonio Ramirez MD;  Location: [facility] OR;  Service: Ophthalmology;  Laterality: Bilateral;       Social History     Socioeconomic History    Marital status:    Tobacco Use    Smoking status: Never    Smokeless tobacco: Never    Tobacco comments:     Caffeine use    Vaping Use    Vaping status: Never Used   Substance and Sexual Activity    Alcohol use: Yes     Comment: 2 glasses of wine daily    Drug use: No    Sexual activity: Defer       Family History   Problem Relation Age of Onset    COPD Mother     Hypertension Father     Diabetes Father     Stroke Father     Coronary artery disease Father     Kidney disease Sister     Diabetes Brother     Other Brother         coronary  artery calcification    No Known Problems Maternal Grandmother     No Known Problems Maternal Grandfather     No Known Problems Paternal Grandmother     No Known Problems Paternal Grandfather     No Known Problems Maternal Aunt     No Known Problems Maternal Uncle     No Known Problems Paternal Aunt     No Known Problems Paternal Uncle     Anesthesia problems Neg Hx     Broken bones Neg Hx     Cancer Neg Hx     Clotting disorder Neg Hx     Collagen disease Neg Hx     Dislocations Neg Hx     Osteoporosis Neg Hx     Rheumatologic disease Neg Hx     Scoliosis Neg Hx     Severe sprains Neg Hx     Malig Hyperthermia Neg Hx        Review of Systems   Constitutional: Negative for chills, fever and malaise/fatigue.   Cardiovascular:  Positive for irregular heartbeat. Negative for chest pain, dyspnea on exertion, leg swelling, near-syncope, orthopnea, palpitations, paroxysmal nocturnal dyspnea and syncope.   Respiratory:  Negative for cough and shortness of breath.    Hematologic/Lymphatic: Negative.    Musculoskeletal:  Negative for joint pain, joint swelling and myalgias.   Gastrointestinal:  Negative for abdominal pain, diarrhea, melena, nausea and vomiting.   Genitourinary:  Negative for frequency and hematuria.   Neurological:  Positive for dizziness. Negative for light-headedness, numbness, paresthesias and seizures.   Allergic/Immunologic: Negative.    All other systems reviewed and are negative.      No Known Allergies      Current Outpatient Medications:     atorvastatin (LIPITOR) 20 MG tablet, Take 1 tablet by mouth Daily., Disp: 90 tablet, Rfl: 3    cholecalciferol (VITAMIN D3) 1000 units tablet, Take 1 tablet by mouth Daily., Disp: , Rfl:     cycloSPORINE (Vevye) 0.1 % solution, instill one drop in both eyes 2 times a day, Disp: , Rfl:     Eliquis 5 MG tablet tablet, TAKE 1 TABLET EVERY 12 HOURS, Disp: 180 tablet, Rfl: 3    levothyroxine (SYNTHROID, LEVOTHROID) 50 MCG tablet, TAKE 1 TABLET DAILY, Disp: 90 tablet,  "Rfl: 3    metoprolol tartrate (LOPRESSOR) 25 MG tablet, Take 1 tablet by mouth Daily As Needed (elevated heart rate). (Patient taking differently: Take 1 tablet by mouth Daily As Needed (elevated heart rate). PRN ONLY), Disp: 90 tablet, Rfl: 3    NON FORMULARY, Ayers Revive Supplement, Disp: , Rfl:     sotalol (Betapace) 120 MG tablet, Take 0.5 tablets by mouth Daily., Disp: 45 tablet, Rfl: 2    vitamin C (ASCORBIC ACID) 500 MG tablet, Take 2 tablets by mouth Daily., Disp: , Rfl:       Objective:     Vitals:    07/17/24 0959   BP: 116/70   Pulse: (!) 45   Weight: 108 kg (239 lb)   Height: 188 cm (74\")     Body mass index is 30.69 kg/m².    PHYSICAL EXAM:    Vitals Reviewed.   General Appearance: No acute distress, well developed and well nourished.   HENT: Atraumatic, normocephalic. External eyes, ears, and nose normal.   Respiratory: No signs of respiratory distress. Respiration rhythm and depth normal.   Clear to auscultation. No rales, crackles, rhonchi, or wheezing auscultated.   Cardiovascular:  Heart Rate and Rhythm: Normal, Heart Sounds: Normal S1 and S2.   Murmurs: No murmurs noted. No rubs, thrills, or gallops.   Lower Extremities: No edema noted.  Musculoskeletal: Normal movement of extremities  Skin: Warm and dry.   Psychiatric: Patient alert and oriented to person, place, and time. Speech and behavior appropriate. Normal mood and affect.       ECG 12 Lead    Date/Time: 7/17/2024 10:05 AM  Performed by: Angie Duncan APRN    Authorized by: Angie Duncan APRN  Comparison: compared with previous ECG   Similar to previous ECG  Rhythm: sinus rhythm  Ectopy: atrial premature contractions  BPM: 45            Assessment:       Diagnosis Plan   1. Paroxysmal atrial fibrillation  ECG 12 Lead      2. H/O cardiac radiofrequency ablation---6/2024 PVI        3. Orthostatic dizziness               Plan:       1-2. PAF, s/p PVI 6/2024---remains on low dose sotalol, QTc fine---he does have bradycardia but all sinus " and his HR does increase with activity---he has a history of low resting HR---we had long discussion---he had PAF for the first 3 weeks post ablation, none in the last week-----I think he should stay on the sotalol for now but hopefully no AF when he returns to see Dr. Zamudio and can consider stopping it. Remains on apixaban for OAC. Encouraged him to start back exercising and normal activity and see how he does.     3. Orthostatic dizziness---we had a long discussion about how to manage---he was on losartan for HTN in the past---it was stopped last year due to SVETA but also no need to restart it---his b/p was 116/70 today--he is on no other meds     Follow up with Dr. Zamudio in 3 months.     As always, it has been a pleasure to participate in your patient's care.      Sincerely,         PAULA Flores

## 2024-08-01 ENCOUNTER — TRANSCRIBE ORDERS (OUTPATIENT)
Dept: ADMINISTRATIVE | Facility: HOSPITAL | Age: 79
End: 2024-08-01
Payer: MEDICARE

## 2024-08-01 ENCOUNTER — LAB (OUTPATIENT)
Facility: HOSPITAL | Age: 79
End: 2024-08-01
Payer: MEDICARE

## 2024-08-01 DIAGNOSIS — M10.072 IDIOPATHIC GOUT OF LEFT FOOT, UNSPECIFIED CHRONICITY: ICD-10-CM

## 2024-08-01 DIAGNOSIS — M10.072 IDIOPATHIC GOUT OF LEFT ANKLE, UNSPECIFIED CHRONICITY: ICD-10-CM

## 2024-08-01 DIAGNOSIS — M10.072 IDIOPATHIC GOUT OF LEFT FOOT, UNSPECIFIED CHRONICITY: Primary | ICD-10-CM

## 2024-08-01 LAB
ALBUMIN SERPL-MCNC: 4 G/DL (ref 3.5–5.2)
ALBUMIN/GLOB SERPL: 1.4 G/DL
ALP SERPL-CCNC: 88 U/L (ref 39–117)
ALT SERPL W P-5'-P-CCNC: 16 U/L (ref 1–41)
ANION GAP SERPL CALCULATED.3IONS-SCNC: 10 MMOL/L (ref 5–15)
AST SERPL-CCNC: 18 U/L (ref 1–40)
BILIRUB SERPL-MCNC: 0.6 MG/DL (ref 0–1.2)
BUN SERPL-MCNC: 20 MG/DL (ref 8–23)
BUN/CREAT SERPL: 16.8 (ref 7–25)
CALCIUM SPEC-SCNC: 9.6 MG/DL (ref 8.6–10.5)
CHLORIDE SERPL-SCNC: 104 MMOL/L (ref 98–107)
CO2 SERPL-SCNC: 21 MMOL/L (ref 22–29)
CREAT SERPL-MCNC: 1.19 MG/DL (ref 0.76–1.27)
EGFRCR SERPLBLD CKD-EPI 2021: 62.9 ML/MIN/1.73
GLOBULIN UR ELPH-MCNC: 2.8 GM/DL
GLUCOSE SERPL-MCNC: 175 MG/DL (ref 65–99)
POTASSIUM SERPL-SCNC: 4.5 MMOL/L (ref 3.5–5.2)
PROT SERPL-MCNC: 6.8 G/DL (ref 6–8.5)
SODIUM SERPL-SCNC: 135 MMOL/L (ref 136–145)
URATE SERPL-MCNC: 8.3 MG/DL (ref 3.4–7)

## 2024-08-01 PROCEDURE — 36415 COLL VENOUS BLD VENIPUNCTURE: CPT

## 2024-08-01 PROCEDURE — 84550 ASSAY OF BLOOD/URIC ACID: CPT

## 2024-08-01 PROCEDURE — 80053 COMPREHEN METABOLIC PANEL: CPT

## 2024-08-20 ENCOUNTER — OFFICE VISIT (OUTPATIENT)
Dept: INTERNAL MEDICINE | Facility: CLINIC | Age: 79
End: 2024-08-20
Payer: MEDICARE

## 2024-08-20 ENCOUNTER — LAB (OUTPATIENT)
Facility: HOSPITAL | Age: 79
End: 2024-08-20
Payer: MEDICARE

## 2024-08-20 ENCOUNTER — TRANSCRIBE ORDERS (OUTPATIENT)
Dept: ADMINISTRATIVE | Facility: HOSPITAL | Age: 79
End: 2024-08-20
Payer: MEDICARE

## 2024-08-20 VITALS
SYSTOLIC BLOOD PRESSURE: 142 MMHG | BODY MASS INDEX: 31.11 KG/M2 | HEIGHT: 74 IN | WEIGHT: 242.4 LBS | DIASTOLIC BLOOD PRESSURE: 82 MMHG | HEART RATE: 42 BPM

## 2024-08-20 DIAGNOSIS — M10.072 IDIOPATHIC GOUT, LEFT ANKLE AND FOOT: ICD-10-CM

## 2024-08-20 DIAGNOSIS — Z00.00 ENCOUNTER FOR SUBSEQUENT ANNUAL WELLNESS VISIT (AWV) IN MEDICARE PATIENT: Primary | ICD-10-CM

## 2024-08-20 DIAGNOSIS — D12.6 TUBULAR ADENOMA OF COLON: ICD-10-CM

## 2024-08-20 DIAGNOSIS — R73.03 PRE-DIABETES: ICD-10-CM

## 2024-08-20 DIAGNOSIS — R03.0 ELEVATED BLOOD PRESSURE READING: ICD-10-CM

## 2024-08-20 DIAGNOSIS — M10.072 IDIOPATHIC GOUT, LEFT ANKLE AND FOOT: Primary | ICD-10-CM

## 2024-08-20 DIAGNOSIS — E03.2 HYPOTHYROIDISM DUE TO MEDICATION: Chronic | ICD-10-CM

## 2024-08-20 DIAGNOSIS — G47.33 OSA ON CPAP: ICD-10-CM

## 2024-08-20 DIAGNOSIS — N18.31 STAGE 3A CHRONIC KIDNEY DISEASE: ICD-10-CM

## 2024-08-20 PROBLEM — Z98.890 H/O CARDIAC RADIOFREQUENCY ABLATION: Status: RESOLVED | Noted: 2024-07-17 | Resolved: 2024-08-20

## 2024-08-20 PROBLEM — M35.3 PMR (POLYMYALGIA RHEUMATICA): Status: RESOLVED | Noted: 2020-03-12 | Resolved: 2024-08-20

## 2024-08-20 LAB — URATE SERPL-MCNC: 5.3 MG/DL (ref 3.4–7)

## 2024-08-20 PROCEDURE — 84550 ASSAY OF BLOOD/URIC ACID: CPT

## 2024-08-20 PROCEDURE — 1159F MED LIST DOCD IN RCRD: CPT | Performed by: INTERNAL MEDICINE

## 2024-08-20 PROCEDURE — 3077F SYST BP >= 140 MM HG: CPT | Performed by: INTERNAL MEDICINE

## 2024-08-20 PROCEDURE — 1170F FXNL STATUS ASSESSED: CPT | Performed by: INTERNAL MEDICINE

## 2024-08-20 PROCEDURE — 3078F DIAST BP <80 MM HG: CPT | Performed by: INTERNAL MEDICINE

## 2024-08-20 PROCEDURE — 36415 COLL VENOUS BLD VENIPUNCTURE: CPT

## 2024-08-20 PROCEDURE — 99214 OFFICE O/P EST MOD 30 MIN: CPT | Performed by: INTERNAL MEDICINE

## 2024-08-20 PROCEDURE — 1160F RVW MEDS BY RX/DR IN RCRD: CPT | Performed by: INTERNAL MEDICINE

## 2024-08-20 PROCEDURE — G0439 PPPS, SUBSEQ VISIT: HCPCS | Performed by: INTERNAL MEDICINE

## 2024-08-20 PROCEDURE — 1126F AMNT PAIN NOTED NONE PRSNT: CPT | Performed by: INTERNAL MEDICINE

## 2024-08-20 RX ORDER — ALLOPURINOL 300 MG/1
1 TABLET ORAL DAILY
COMMUNITY
Start: 2024-08-02

## 2024-08-20 RX ORDER — LEVOTHYROXINE SODIUM 0.07 MG/1
75 TABLET ORAL DAILY
Qty: 90 TABLET | Refills: 1 | Status: SHIPPED | OUTPATIENT
Start: 2024-08-20

## 2024-08-20 NOTE — PROGRESS NOTES
Subjective   The ABCs of the Annual Wellness Visit  Medicare Wellness Visit      Gary Grullon is a 79 y.o. patient who presents for a Medicare Wellness Visit.    The following portions of the patient's history were reviewed and   updated as appropriate: allergies, current medications, past family history, past medical history, past social history, past surgical history, and problem list.    Compared to one year ago, the patient's physical   health is the same.  Compared to one year ago, the patient's mental   health is the same.    Recent Hospitalizations:  He was admitted within the past 365 days at [facility].     Current Medical Providers:  Patient Care Team:  Rommel Wilkerson MD as PCP - General (Internal Medicine)  Yash Soliz MD as Consulting Physician (Cardiology)    Outpatient Medications Prior to Visit   Medication Sig Dispense Refill    allopurinol (ZYLOPRIM) 300 MG tablet Take 1 tablet by mouth Daily.      atorvastatin (LIPITOR) 20 MG tablet Take 1 tablet by mouth Daily. 90 tablet 3    cholecalciferol (VITAMIN D3) 1000 units tablet Take 1 tablet by mouth Daily.      cycloSPORINE (Vevye) 0.1 % solution instill one drop in both eyes 2 times a day      Eliquis 5 MG tablet tablet TAKE 1 TABLET EVERY 12 HOURS 180 tablet 3    metoprolol tartrate (LOPRESSOR) 25 MG tablet Take 1 tablet by mouth Daily As Needed (elevated heart rate). (Patient taking differently: Take 1 tablet by mouth Daily As Needed (elevated heart rate). PRN ONLY) 90 tablet 3    NON FORMULARY Ayers Revive Supplement      sotalol (Betapace) 120 MG tablet Take 0.5 tablets by mouth Daily. 45 tablet 2    TART CHERRY PO Take  by mouth.      vitamin C (ASCORBIC ACID) 500 MG tablet Take 2 tablets by mouth Daily.      levothyroxine (SYNTHROID, LEVOTHROID) 50 MCG tablet TAKE 1 TABLET DAILY 90 tablet 3     No facility-administered medications prior to visit.     No opioid medication identified on active medication list. I have  "reviewed chart for other potential  high risk medication/s and harmful drug interactions in the elderly.      Aspirin is not on active medication list.  Aspirin use is not indicated based on review of current medical condition/s. Risk of harm outweighs potential benefits.  .    Patient Active Problem List   Diagnosis    Hypercholesteremia    Essential hypertension    Tubular adenoma of colon    HEDY on CPAP    Pre-diabetes    Class 1 obesity due to excess calories without serious comorbidity with body mass index (BMI) of 31.0 to 31.9 in adult    Paroxysmal atrial fibrillation    Stage 3a chronic kidney disease    Tachycardia induced cardiomyopathy    Hypothyroidism due to medication    SVT (supraventricular tachycardia)    Orthostatic dizziness     Advance Care Planning Advance Directive is not on file.  ACP discussion was held with the patient during this visit. Patient has an advance directive (not in EMR), copy requested.            Objective   Vitals:    08/20/24 0827 08/20/24 0857   BP: 150/70 142/82   Pulse:  (!) 42   Weight: 110 kg (242 lb 6.4 oz)    Height: 188 cm (74.02\")        Estimated body mass index is 31.11 kg/m² as calculated from the following:    Height as of this encounter: 188 cm (74.02\").    Weight as of this encounter: 110 kg (242 lb 6.4 oz).    BMI is >= 30 and <35. (Class 1 Obesity). The following options were offered after discussion;: exercise counseling/recommendations       Does the patient have evidence of cognitive impairment? No  Lab Results   Component Value Date    HGBA1C 6.00 (H) 08/13/2024                                                                                                Health  Risk Assessment    Smoking Status:  Social History     Tobacco Use   Smoking Status Never   Smokeless Tobacco Never   Tobacco Comments    Caffeine use      Alcohol Consumption:  Social History     Substance and Sexual Activity   Alcohol Use Yes    Comment: 2 glasses of wine daily       Fall Risk " Screen  STEADI Fall Risk Assessment was completed, and patient is at LOW risk for falls.Assessment completed on:2024    Depression Screenin/20/2024     8:32 AM   PHQ-2/PHQ-9 Depression Screening   Little Interest or Pleasure in Doing Things 0-->not at all   Feeling Down, Depressed or Hopeless 0-->not at all   PHQ-9: Brief Depression Severity Measure Score 0     Health Habits and Functional and Cognitive Screenin/20/2024     8:32 AM   Functional & Cognitive Status   Do you have difficulty preparing food and eating? No   Do you have difficulty bathing yourself, getting dressed or grooming yourself? No   Do you have difficulty using the toilet? No   Do you have difficulty moving around from place to place? No   Do you have trouble with steps or getting out of a bed or a chair? No   Current Diet Well Balanced Diet   Dental Exam Up to date   Eye Exam Up to date   Exercise (times per week) 5 times per week   Current Exercises Include Walking;Tennis;Pickleball   Do you need help using the phone?  No   Are you deaf or do you have serious difficulty hearing?  No   Do you need help to go to places out of walking distance? No   Do you need help shopping? No   Do you need help preparing meals?  No   Do you need help with housework?  No   Do you need help with laundry? No   Do you need help taking your medications? No   Do you need help managing money? No   Do you ever drive or ride in a car without wearing a seat belt? No   Have you felt unusual stress, anger or loneliness in the last month? No   Who do you live with? Spouse   If you need help, do you have trouble finding someone available to you? No   Have you been bothered in the last four weeks by sexual problems? No   Do you have difficulty concentrating, remembering or making decisions? No           Age-appropriate Screening Schedule:  Refer to the list below for future screening recommendations based on patient's age, sex and/or medical conditions.  Orders for these recommended tests are listed in the plan section. The patient has been provided with a written plan.    Health Maintenance List  Health Maintenance   Topic Date Due    RSV Vaccine - Adults (1 - 1-dose 60+ series) Never done    COVID-19 Vaccine (7 - 2023-24 season) 04/13/2024    BMI FOLLOWUP  08/15/2024    INFLUENZA VACCINE  08/01/2024    LIPID PANEL  02/19/2025    ANNUAL WELLNESS VISIT  08/20/2025    COLORECTAL CANCER SCREENING  04/04/2028    TDAP/TD VACCINES (3 - Td or Tdap) 11/02/2029    HEPATITIS C SCREENING  Completed    Pneumococcal Vaccine 65+  Completed    ZOSTER VACCINE  Completed                                                                                                                                                CMS Preventative Services Quick Reference  Risk Factors Identified During Encounter  None Identified    The above risks/problems have been discussed with the patient.  Pertinent information has been shared with the patient in the After Visit Summary.  An After Visit Summary and PPPS were made available to the patient.    Follow Up:   Next Medicare Wellness visit to be scheduled in 1 year.         Additional E&M Note during same encounter follows:  Patient has additional, significant, and separately identifiable condition(s)/problem(s) that require work above and beyond the Medicare Wellness Visit     Chief Complaint  Medicare Wellness-subsequent    Subjective   HPI AWV and hypothyroidism    He had cardiac ablation in June. Denies chest pain, dyspnea or palpitations. He is playing tennis and pickle ball. He was diagnosed with gout in his left great toe and was given steroids. Then he was started on allopurinol; he is to get his uric acid level drawn. He uses CPAP all night. He has decreased his wine intake to one glass of wine daily given his gout. He has decreased his shellfish. He is traveling to [place] in November. He can feel dizzy when he stands up. He denies presyncope.  "He adds salt to his food. He denies sudafed or NSAID usage.              Objective   Vital Signs:  /82   Pulse (!) 42   Ht 188 cm (74.02\")   Wt 110 kg (242 lb 6.4 oz)   BMI 31.11 kg/m²   Physical Exam  Vitals reviewed.   Constitutional:       Appearance: He is well-developed.   HENT:      Head: Normocephalic and atraumatic.   Neck:      Vascular: No carotid bruit.   Cardiovascular:      Rate and Rhythm: Regular rhythm. Bradycardia present. Occasional Extrasystoles are present.     Heart sounds: Normal heart sounds, S1 normal and S2 normal.   Pulmonary:      Effort: Pulmonary effort is normal.      Breath sounds: Normal breath sounds.   Abdominal:      Palpations: Abdomen is soft. There is no hepatomegaly or splenomegaly.   Skin:     General: Skin is warm.   Neurological:      Mental Status: He is alert.   Psychiatric:         Behavior: Behavior normal.                 Assessment and Plan               Encounter for subsequent annual wellness visit (AWV) in Medicare patient    HEDY on CPAP    Tubular adenoma of colon    Hypothyroidism due to medication    Pre-diabetes    Stage 3a chronic kidney disease    Elevated blood pressure reading    Diagnoses and all orders for this visit:    1. Encounter for subsequent annual wellness visit (AWV) in Medicare patient (Primary)    2. HEDY on CPAP  Comments:  uses CPAP    3. Tubular adenoma of colon  -     Ambulatory Referral For Screening Colonoscopy    4. Hypothyroidism due to medication  -     levothyroxine (Synthroid) 75 MCG tablet; Take 1 tablet by mouth Daily.  Dispense: 90 tablet; Refill: 1  -     T4, Free; Future  -     TSH; Future    5. Pre-diabetes    6. Stage 3a chronic kidney disease  Comments:  Stable. aware to avoid NSAIDS.  Assessment & Plan:        7. Elevated blood pressure reading  Comments:  He will decrease salt. He will start BP again and bring in monitor       New Medications Ordered This Visit   Medications    levothyroxine (Synthroid) 75 MCG " tablet     Sig: Take 1 tablet by mouth Daily.     Dispense:  90 tablet     Refill:  1          Follow Up   Return in about 6 weeks (around 10/1/2024), or 30 minutes, for Lab Before FUP.  Patient was given instructions and counseling regarding his condition or for health maintenance advice. Please see specific information pulled into the AVS if appropriate.    Reviewed DC summary, echo, TSH, FT4, A1c, and uric acid. He will call [employer] for consult for  trip. Decrease salt and start checking BP at home regularly.

## 2024-08-21 DIAGNOSIS — I48.19 PERSISTENT ATRIAL FIBRILLATION: ICD-10-CM

## 2024-08-21 DIAGNOSIS — I10 ESSENTIAL HYPERTENSION: Chronic | ICD-10-CM

## 2024-08-22 RX ORDER — ATORVASTATIN CALCIUM 20 MG/1
20 TABLET, FILM COATED ORAL DAILY
Qty: 90 TABLET | Refills: 3 | Status: SHIPPED | OUTPATIENT
Start: 2024-08-22

## 2024-08-22 RX ORDER — APIXABAN 5 MG/1
TABLET, FILM COATED ORAL
Qty: 180 TABLET | Refills: 3 | Status: SHIPPED | OUTPATIENT
Start: 2024-08-22

## 2024-08-22 RX ORDER — LOSARTAN POTASSIUM 25 MG/1
TABLET ORAL
Qty: 45 TABLET | Refills: 3 | OUTPATIENT
Start: 2024-08-22

## 2024-09-06 ENCOUNTER — PREP FOR SURGERY (OUTPATIENT)
Dept: SURGERY | Facility: SURGERY CENTER | Age: 79
End: 2024-09-06
Payer: MEDICARE

## 2024-09-06 DIAGNOSIS — D12.6 TUBULAR ADENOMA OF COLON: ICD-10-CM

## 2024-09-06 DIAGNOSIS — Z12.11 ENCOUNTER FOR SCREENING FOR MALIGNANT NEOPLASM OF COLON: Primary | ICD-10-CM

## 2024-09-06 RX ORDER — SODIUM CHLORIDE 0.9 % (FLUSH) 0.9 %
3 SYRINGE (ML) INJECTION EVERY 12 HOURS SCHEDULED
OUTPATIENT
Start: 2024-09-06

## 2024-09-06 RX ORDER — SODIUM CHLORIDE 0.9 % (FLUSH) 0.9 %
10 SYRINGE (ML) INJECTION AS NEEDED
OUTPATIENT
Start: 2024-09-06

## 2024-09-06 RX ORDER — SODIUM CHLORIDE, SODIUM LACTATE, POTASSIUM CHLORIDE, CALCIUM CHLORIDE 600; 310; 30; 20 MG/100ML; MG/100ML; MG/100ML; MG/100ML
30 INJECTION, SOLUTION INTRAVENOUS CONTINUOUS PRN
OUTPATIENT
Start: 2024-09-06

## 2024-09-09 RX ORDER — SOTALOL HYDROCHLORIDE 120 MG/1
60 TABLET ORAL DAILY
Qty: 45 TABLET | Refills: 2 | Status: SHIPPED | OUTPATIENT
Start: 2024-09-09

## 2024-09-30 ENCOUNTER — TELEPHONE (OUTPATIENT)
Dept: GASTROENTEROLOGY | Facility: CLINIC | Age: 79
End: 2024-09-30
Payer: MEDICARE

## 2024-09-30 NOTE — TELEPHONE ENCOUNTER
"        Hub staff attempted to follow warm transfer process and was unsuccessful     Caller: Gary Grullon W \"Ed\"    Relationship to patient: Self    Best call back number: [phone]    Patient is needing: PT IS CALLING TO ASK ABOUT HOW LONG AFTER PROCEDURE WILL HE BE DISCHARGED ON 10/03/24. PLEASE CALL BACK AND ADVISE.          "

## 2024-10-01 ENCOUNTER — OFFICE VISIT (OUTPATIENT)
Dept: INTERNAL MEDICINE | Facility: CLINIC | Age: 79
End: 2024-10-01
Payer: MEDICARE

## 2024-10-01 VITALS
DIASTOLIC BLOOD PRESSURE: 82 MMHG | BODY MASS INDEX: 30.98 KG/M2 | HEIGHT: 74 IN | SYSTOLIC BLOOD PRESSURE: 134 MMHG | WEIGHT: 241.4 LBS

## 2024-10-01 DIAGNOSIS — R73.03 PRE-DIABETES: ICD-10-CM

## 2024-10-01 DIAGNOSIS — E03.2 HYPOTHYROIDISM DUE TO MEDICATION: Primary | Chronic | ICD-10-CM

## 2024-10-01 DIAGNOSIS — I10 ESSENTIAL HYPERTENSION: Chronic | ICD-10-CM

## 2024-10-01 DIAGNOSIS — Z78.9 H/O FOREIGN TRAVEL: ICD-10-CM

## 2024-10-01 PROCEDURE — G2211 COMPLEX E/M VISIT ADD ON: HCPCS | Performed by: INTERNAL MEDICINE

## 2024-10-01 PROCEDURE — 99214 OFFICE O/P EST MOD 30 MIN: CPT | Performed by: INTERNAL MEDICINE

## 2024-10-01 PROCEDURE — 1126F AMNT PAIN NOTED NONE PRSNT: CPT | Performed by: INTERNAL MEDICINE

## 2024-10-01 PROCEDURE — 1160F RVW MEDS BY RX/DR IN RCRD: CPT | Performed by: INTERNAL MEDICINE

## 2024-10-01 PROCEDURE — 3075F SYST BP GE 130 - 139MM HG: CPT | Performed by: INTERNAL MEDICINE

## 2024-10-01 PROCEDURE — 1159F MED LIST DOCD IN RCRD: CPT | Performed by: INTERNAL MEDICINE

## 2024-10-01 PROCEDURE — 3079F DIAST BP 80-89 MM HG: CPT | Performed by: INTERNAL MEDICINE

## 2024-10-01 NOTE — PROGRESS NOTES
Subjective        Chief Complaint   Patient presents with    Hypertension           Gary Grullon is a 79 y.o. male who presents for    Patient Active Problem List   Diagnosis    Hypercholesteremia    Essential hypertension    Tubular adenoma of colon    HEDY on CPAP    Pre-diabetes    Class 1 obesity due to excess calories without serious comorbidity with body mass index (BMI) of 31.0 to 31.9 in adult    Paroxysmal atrial fibrillation    Stage 3a chronic kidney disease    Tachycardia induced cardiomyopathy    Hypothyroidism due to medication    SVT (supraventricular tachycardia)    Orthostatic dizziness    Encounter for screening for malignant neoplasm of colon       History of Present Illness       His -132/[id number]. Denies chest pain or dyspnea. He is traveling to [place] soon. He is doing a zoom call with ID from [employer].  No Known Allergies    Current Outpatient Medications on File Prior to Visit   Medication Sig Dispense Refill    allopurinol (ZYLOPRIM) 300 MG tablet Take 1 tablet by mouth Daily.      atorvastatin (LIPITOR) 20 MG tablet TAKE 1 TABLET DAILY 90 tablet 3    cholecalciferol (VITAMIN D3) 1000 units tablet Take 1 tablet by mouth Daily.      cycloSPORINE (Vevye) 0.1 % solution instill one drop in both eyes 2 times a day      Eliquis 5 MG tablet tablet TAKE 1 TABLET EVERY 12 HOURS 180 tablet 3    levothyroxine (Synthroid) 75 MCG tablet Take 1 tablet by mouth Daily. 90 tablet 1    metoprolol tartrate (LOPRESSOR) 25 MG tablet Take 1 tablet by mouth Daily As Needed (elevated heart rate). (Patient taking differently: Take 1 tablet by mouth Daily As Needed (elevated heart rate). PRN ONLY) 90 tablet 3    sotalol (Betapace) 120 MG tablet Take 0.5 tablets by mouth Daily. 45 tablet 2    [DISCONTINUED] vitamin C (ASCORBIC ACID) 500 MG tablet Take 2 tablets by mouth Daily.      [DISCONTINUED] NON FORMULARY Ayers Revive Supplement (Patient not taking: Reported on 10/1/2024)      [DISCONTINUED] TARGEOVANY MCCARTYRY PO  Take  by mouth. (Patient not taking: Reported on 10/1/2024)       No current facility-administered medications on file prior to visit.       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 INVASIVE LOCATION;  Service: Cardiovascular;  Laterality: N/A;    CATARACT EXTRACTION      COLONOSCOPY  04/04/2018    DR Gary Guerrero [facility]    CYST REMOVAL  1970    PILONIDAL CYST DRAINAGE      TEMPORAL ARTERY BIOPSY Bilateral 10/11/2022    Procedure: BILATERAL TEMPORAL ARTERY BIOPSY;  Surgeon: José Antonio Ramirez MD;  Location: [facility] OR;  Service: Ophthalmology;  Laterality: Bilateral;       Family History   Problem Relation Age of Onset    COPD Mother     Hypertension Father     Diabetes Father     Stroke Father     Coronary artery disease Father     Kidney disease Sister     Diabetes Brother     Other Brother         coronary artery calcification    No Known Problems Maternal Grandmother     No Known Problems Maternal Grandfather     No Known Problems Paternal Grandmother     No Known Problems Paternal Grandfather     No Known Problems Maternal Aunt     No Known Problems Maternal Uncle     No Known Problems Paternal Aunt     No Known Problems Paternal Uncle     Anesthesia problems Neg Hx     Broken bones Neg Hx     Cancer Neg Hx     Clotting disorder Neg Hx     Collagen disease Neg Hx     Dislocations Neg Hx     Osteoporosis Neg Hx     Rheumatologic disease Neg Hx      "Scoliosis Neg Hx     Severe sprains Neg Hx     Malig Hyperthermia Neg Hx        Social History     Socioeconomic History    Marital status:    Tobacco Use    Smoking status: Never    Smokeless tobacco: Never    Tobacco comments:     Caffeine use    Vaping Use    Vaping status: Never Used   Substance and Sexual Activity    Alcohol use: Yes     Comment: 2 glasses of wine daily    Drug use: No    Sexual activity: Defer           The following portions of the patient's history were reviewed and updated as appropriate: problem list, allergies, current medications, past medical history, past family history, past social history, and past surgical history.    Review of Systems    Immunization History   Administered Date(s) Administered    COVID-19 (MODERNA) 12YRS+ (SPIKEVAX) 12/13/2023, 09/02/2024    COVID-19 (MODERNA) 1st,2nd,3rd Dose Monovalent 01/13/2021, 02/10/2021, 08/25/2021    COVID-19 (MODERNA) BIVALENT 12+YRS 10/19/2022    COVID-19 (MODERNA) Monovalent Original Booster 04/16/2022    FLUAD TRI 65YR+ 10/02/2022    Fluad Quad 65+ 09/13/2019, 10/10/2021    Fluzone High-Dose 65+YRS 09/01/2016, 09/06/2018, 09/01/2020, 09/02/2024    Fluzone High-Dose 65+yrs 09/30/2022, 10/05/2023    Hepatitis A 03/21/2006, 10/25/2011    Hepatitis B Adult/Adolescent IM 03/21/2006, 07/08/2007, 02/02/2012    MMR 03/13/2008    Meningococcal, Unspecified 01/14/2013    Pneumococcal Conjugate 13-Valent (PCV13) 08/31/2016    Pneumococcal Conjugate 20-Valent (PCV20) 09/30/2022    Pneumococcal Polysaccharide (PPSV23) 12/10/2011    Polio, Unspecified 01/15/2003    RSV, unspecified (Vaccine or MAB) 09/26/2024    Shingrix 08/12/2019, 06/19/2020    Tdap 01/01/2012, 11/02/2019    Typhoid, Unspecified 01/14/2013    Yellow Fever 01/14/2013    Zostavax 10/19/2012, 08/12/2019       Objective   Vitals:    10/01/24 1127   BP: 134/82   Weight: 109 kg (241 lb 6.4 oz)   Height: 188 cm (74.02\")     Body mass index is 30.98 kg/m².  Physical Exam  Vitals " reviewed.   Constitutional:       Appearance: He is well-developed.   HENT:      Head: Normocephalic and atraumatic.   Cardiovascular:      Rate and Rhythm: Normal rate and regular rhythm.      Heart sounds: Normal heart sounds, S1 normal and S2 normal.   Pulmonary:      Effort: Pulmonary effort is normal.      Breath sounds: Normal breath sounds.   Skin:     General: Skin is warm.   Neurological:      Mental Status: He is alert.   Psychiatric:         Behavior: Behavior normal.         Procedures    Assessment & Plan   Diagnoses and all orders for this visit:    1. Hypothyroidism due to medication (Primary)  -     T4, Free; Future  -     TSH; Future    2. H/O foreign travel  Comments:  recc get pepto bismol. He has zoom call with [employer] ID yolanda.    3. Pre-diabetes  -     Comprehensive Metabolic Panel; Future  -     Hemoglobin A1c; Future    4. Essential hypertension  Comments:  BP OK today                 TSH and Ft4 nl.  Return in about 4 months (around 2/1/2025), or 30 minutes, for Lab Before FUP.

## 2024-10-03 ENCOUNTER — HOSPITAL ENCOUNTER (OUTPATIENT)
Facility: SURGERY CENTER | Age: 79
Setting detail: HOSPITAL OUTPATIENT SURGERY
Discharge: HOME | End: 2024-10-03
Attending: INTERNAL MEDICINE | Admitting: INTERNAL MEDICINE
Payer: MEDICARE

## 2024-10-03 ENCOUNTER — ANESTHESIA (OUTPATIENT)
Dept: SURGERY | Facility: SURGERY CENTER | Age: 79
End: 2024-10-03
Payer: MEDICARE

## 2024-10-03 ENCOUNTER — ANESTHESIA EVENT (OUTPATIENT)
Dept: SURGERY | Facility: SURGERY CENTER | Age: 79
End: 2024-10-03
Payer: MEDICARE

## 2024-10-03 VITALS
SYSTOLIC BLOOD PRESSURE: 119 MMHG | OXYGEN SATURATION: 96 % | WEIGHT: 239 LBS | TEMPERATURE: 97.8 F | DIASTOLIC BLOOD PRESSURE: 81 MMHG | HEART RATE: 56 BPM | RESPIRATION RATE: 16 BRPM | BODY MASS INDEX: 30.67 KG/M2 | HEIGHT: 74 IN

## 2024-10-03 DIAGNOSIS — D12.6 TUBULAR ADENOMA OF COLON: ICD-10-CM

## 2024-10-03 DIAGNOSIS — Z12.11 ENCOUNTER FOR SCREENING FOR MALIGNANT NEOPLASM OF COLON: ICD-10-CM

## 2024-10-03 PROCEDURE — 25010000002 PROPOFOL 10 MG/ML EMULSION: Performed by: NURSE ANESTHETIST, CERTIFIED REGISTERED

## 2024-10-03 PROCEDURE — 25010000002 PROPOFOL 200 MG/20ML EMULSION: Performed by: NURSE ANESTHETIST, CERTIFIED REGISTERED

## 2024-10-03 PROCEDURE — 25810000003 LACTATED RINGERS PER 1000 ML: Performed by: INTERNAL MEDICINE

## 2024-10-03 PROCEDURE — 88305 TISSUE EXAM BY PATHOLOGIST: CPT | Performed by: INTERNAL MEDICINE

## 2024-10-03 PROCEDURE — 45385 COLONOSCOPY W/LESION REMOVAL: CPT | Performed by: INTERNAL MEDICINE

## 2024-10-03 PROCEDURE — 45380 COLONOSCOPY AND BIOPSY: CPT | Performed by: INTERNAL MEDICINE

## 2024-10-03 PROCEDURE — 25010000002 LIDOCAINE 1 % SOLUTION: Performed by: NURSE ANESTHETIST, CERTIFIED REGISTERED

## 2024-10-03 RX ORDER — SODIUM CHLORIDE 0.9 % (FLUSH) 0.9 %
3 SYRINGE (ML) INJECTION EVERY 12 HOURS SCHEDULED
Status: DISCONTINUED | OUTPATIENT
Start: 2024-10-03 | End: 2024-10-03 | Stop reason: HOSPADM

## 2024-10-03 RX ORDER — LEVOTHYROXINE SODIUM 50 UG/1
1 CAPSULE ORAL EVERY 24 HOURS
COMMUNITY

## 2024-10-03 RX ORDER — SODIUM CHLORIDE, SODIUM LACTATE, POTASSIUM CHLORIDE, CALCIUM CHLORIDE 600; 310; 30; 20 MG/100ML; MG/100ML; MG/100ML; MG/100ML
30 INJECTION, SOLUTION INTRAVENOUS CONTINUOUS PRN
Status: DISCONTINUED | OUTPATIENT
Start: 2024-10-03 | End: 2024-10-03 | Stop reason: HOSPADM

## 2024-10-03 RX ORDER — LIDOCAINE HYDROCHLORIDE 10 MG/ML
INJECTION, SOLUTION INFILTRATION; PERINEURAL AS NEEDED
Status: DISCONTINUED | OUTPATIENT
Start: 2024-10-03 | End: 2024-10-03 | Stop reason: SURG

## 2024-10-03 RX ORDER — SODIUM CHLORIDE 0.9 % (FLUSH) 0.9 %
10 SYRINGE (ML) INJECTION AS NEEDED
Status: DISCONTINUED | OUTPATIENT
Start: 2024-10-03 | End: 2024-10-03 | Stop reason: HOSPADM

## 2024-10-03 RX ORDER — PROPOFOL 10 MG/ML
INJECTION, EMULSION INTRAVENOUS AS NEEDED
Status: DISCONTINUED | OUTPATIENT
Start: 2024-10-03 | End: 2024-10-03 | Stop reason: SURG

## 2024-10-03 RX ADMIN — PROPOFOL 160 MCG/KG/MIN: 10 INJECTION, EMULSION INTRAVENOUS at 12:39

## 2024-10-03 RX ADMIN — LIDOCAINE HYDROCHLORIDE 100 MG: 10 INJECTION, SOLUTION INFILTRATION; PERINEURAL at 12:39

## 2024-10-03 RX ADMIN — PROPOFOL 100 MG: 10 INJECTION, EMULSION INTRAVENOUS at 12:39

## 2024-10-03 RX ADMIN — SODIUM CHLORIDE, POTASSIUM CHLORIDE, SODIUM LACTATE AND CALCIUM CHLORIDE 30 ML/HR: 600; 310; 30; 20 INJECTION, SOLUTION INTRAVENOUS at 11:28

## 2024-10-03 NOTE — ANESTHESIA PREPROCEDURE EVALUATION
Anesthesia Evaluation     Patient summary reviewed   no history of anesthetic complications:   NPO Solid Status: > 8 hours  NPO Liquid Status: > 2 hours           Airway   Mallampati: II  TM distance: >3 FB  Neck ROM: full  No difficulty expected  Comment: Grade 1v with prvs DL  Dental      Pulmonary     breath sounds clear to auscultation  (+) ,sleep apnea on CPAP  (-) shortness of breath, recent URI, not a smoker  Cardiovascular   Exercise tolerance: good (4-7 METS)    PT is on anticoagulation therapy  Patient on routine beta blocker and Beta blocker given within 24 hours of surgery  Rhythm: regular  Rate: normal    (+) hypertension, dysrhythmias (s/p ablation 6/2024) Paroxysmal Atrial Fib, hyperlipidemia  (-) past MI, angina    ROS comment: ECHO 2023    ·  Left ventricular systolic function is normal. Calculated left ventricular EF = 63.6%  ·  Left ventricular diastolic function is consistent with (grade II w/high LAP) pseudonormalization.  ·  Saline test results are negative.  ·  There is calcification of the aortic valve.  ·  Mild mitral valve regurgitation is present.  ·  Trace to mild tricuspid valve regurgitation is present  ·  Estimated right ventricular systolic pressure from tricuspid regurgitation is normal (<35 mmHg). Calculated right ventricular systolic pressure from tricuspid regurgitation is 30 mmHg.         Neuro/Psych  (-) seizures, CVA  GI/Hepatic/Renal/Endo    (+) obesity, renal disease (Stg 3a, Cr 1.28)- CRI, thyroid problem (HbA1c 6.0) hypothyroidism  (-) morbid obesity, diabetes    Musculoskeletal     (-) neck stiffness  Abdominal    Substance History      OB/GYN          Other        ROS/Med Hx Other: Chronic dry eyes and non-arteritic anterior ischemic optic neuropathy (NAION) b/l    Last Eliquis 9/29/24                  Anesthesia Plan    ASA 3     MAC     (MAC anesthesia discussed with patient and/or patient representative. Risks (including but not limited to intra-op awareness),  benefits, and alternatives were discussed. Understanding was voiced with an agreement to proceed with a MAC technique and General as a backup option. )    Anesthetic plan, risks, benefits, and alternatives have been provided, discussed and informed consent has been obtained with: patient.      CODE STATUS:

## 2024-10-03 NOTE — H&P
No chief complaint on file.      HPI  Patient today for screening colonoscopy.  He has a history of colon polyps.         Problem List:    Patient Active Problem List   Diagnosis    Hypercholesteremia    Essential hypertension    Tubular adenoma of colon    HEDY on CPAP    Pre-diabetes    Class 1 obesity due to excess calories without serious comorbidity with body mass index (BMI) of 31.0 to 31.9 in adult    Paroxysmal atrial fibrillation    Stage 3a chronic kidney disease    Tachycardia induced cardiomyopathy    Hypothyroidism due to medication    SVT (supraventricular tachycardia)    Orthostatic dizziness    Encounter for screening for malignant neoplasm of colon       Medical History:    Past Medical History:   Diagnosis Date    Chronically dry eyes, bilateral 11/16/2023    COVID-19 01/2022    Gout     Gout 2024    left great toe    H/O cardiac radiofrequency ablation---6/2024 PVI 07/17/2024    NAION (non-arteritic anterior ischemic optic neuropathy), 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    HEDY (obstructive sleep apnea)     AHI 15/h supine 49/h    PMR (polymyalgia rheumatica) 03/12/2020        Social History:    Social History     Socioeconomic History    Marital status:    Tobacco Use    Smoking status: Never    Smokeless tobacco: Never    Tobacco comments:     Caffeine use    Vaping Use    Vaping status: Never Used   Substance and Sexual Activity    Alcohol use: Yes     Comment: 2 glasses of wine daily    Drug use: No    Sexual activity: Defer       Family History:   Family History   Problem Relation Age of Onset    COPD Mother     Hypertension Father     Diabetes Father     Stroke Father     Coronary artery disease Father     Kidney disease Sister     Diabetes Brother     Other Brother         coronary artery calcification    No Known Problems Maternal Grandmother     No Known Problems Maternal Grandfather     No Known Problems Paternal Grandmother     No Known Problems Paternal Grandfather     No Known Problems Maternal  Aunt     No Known Problems Maternal Uncle     No Known Problems Paternal Aunt     No Known Problems Paternal Uncle     Anesthesia problems Neg Hx     Broken bones Neg Hx     Cancer Neg Hx     Clotting disorder Neg Hx     Collagen disease Neg Hx     Dislocations Neg Hx     Osteoporosis Neg Hx     Rheumatologic disease Neg Hx     Scoliosis Neg Hx     Severe sprains Neg Hx     Malig Hyperthermia Neg Hx        Surgical History:   Past Surgical History:   Procedure Laterality Date    CARDIAC ELECTROPHYSIOLOGY PROCEDURE N/A 6/13/2024    Procedure: Ablation atrial fibrillation;  Surgeon: Nolan Zamudio MD;  Location: [facility] CATH INVASIVE LOCATION;  Service: Cardiovascular;  Laterality: N/A;    CATARACT EXTRACTION      COLONOSCOPY  04/04/2018    DR Gary Guerrero [facility]    CYST REMOVAL  1970    PILONIDAL CYST DRAINAGE      TEMPORAL ARTERY BIOPSY Bilateral 10/11/2022    Procedure: BILATERAL TEMPORAL ARTERY BIOPSY;  Surgeon: José Antonio Ramirez MD;  Location: [facility] OR;  Service: Ophthalmology;  Laterality: Bilateral;         Current Facility-Administered Medications:     lactated ringers infusion, 30 mL/hr, Intravenous, Continuous PRN, Deonte Adhikari MD    sodium chloride 0.9 % flush 10 mL, 10 mL, Intravenous, PRN, Deonte Adhikari MD    sodium chloride 0.9 % flush 3 mL, 3 mL, Intravenous, Q12H, Deonte Adhikari MD    Allergies: No Known Allergies     The following portions of the patient's history were reviewed by me and updated as appropriate: review of systems, allergies, current medications, past family history, past medical history, past social history, past surgical history and problem list.    There were no vitals filed for this visit.    PHYSICAL EXAM:    CONSTITUTIONAL:  today's vital signs reviewed by me  GASTROINTESTINAL: abdomen is soft nontender nondistended with normal active bowel sounds, no masses are appreciated    Assessment/ Plan  Will proceed today with colonoscopy.    Risks  and benefits as well as alternatives to endoscopic evaluation were explained to the patient and they voiced understanding and wish to proceed.  These risks include but are not limited to the risk of bleeding, perforation, adverse reaction to sedation, and missed lesions.  The patient was given the opportunity to ask questions prior to the endoscopic procedure.

## 2024-10-03 NOTE — ANESTHESIA POSTPROCEDURE EVALUATION
"Patient: Gary Grullon    Procedure Summary       Date: 10/03/24 Room / Location: [facility] / [facility]    Anesthesia Start: 1233 Anesthesia Stop: 1308    Procedure: COLONOSCOPY Diagnosis:       Encounter for screening for malignant neoplasm of colon      Tubular adenoma of colon      (Encounter for screening for malignant neoplasm of colon [Z12.11])      (Tubular adenoma of colon [D12.6])    Surgeons: Deonte Adhikari MD Provider: Stanislaw Alexander DO    Anesthesia Type: MAC ASA Status: 3            Anesthesia Type: MAC    Vitals  Vitals Value Taken Time   /81 10/03/24 1320   Temp 36.6 °C (97.8 °F) 10/03/24 1305   Pulse 56 10/03/24 1320   Resp 16 10/03/24 1320   SpO2 96 % 10/03/24 1320           Post Anesthesia Care and Evaluation    Patient location during evaluation: bedside  Patient participation: complete - patient participated  Level of consciousness: awake and alert  Pain management: adequate    Airway patency: patent  Anesthetic complications: No anesthetic complications  PONV Status: controlled  Cardiovascular status: acceptable and hemodynamically stable  Respiratory status: acceptable, spontaneous ventilation and nonlabored ventilation  Hydration status: acceptable    Comments: /81 (BP Location: Left arm, Patient Position: Lying)   Pulse 56   Temp 36.6 °C (97.8 °F) (Temporal)   Resp 16   Ht 188 cm (74\")   Wt 108 kg (239 lb)   SpO2 96%   BMI 30.69 kg/m²       "

## 2024-10-04 LAB
CYTO UR: NORMAL
LAB AP CASE REPORT: NORMAL
PATH REPORT.FINAL DX SPEC: NORMAL
PATH REPORT.GROSS SPEC: NORMAL

## 2024-10-08 RX ORDER — ATOVAQUONE AND PROGUANIL HYDROCHLORIDE 250; 100 MG/1; MG/1
TABLET, FILM COATED ORAL
Qty: 34 TABLET | Refills: 0 | Status: SHIPPED | OUTPATIENT
Start: 2024-10-08

## 2024-10-23 ENCOUNTER — OFFICE VISIT (OUTPATIENT)
Age: 79
End: 2024-10-23
Payer: MEDICARE

## 2024-10-23 VITALS
BODY MASS INDEX: 31.57 KG/M2 | DIASTOLIC BLOOD PRESSURE: 78 MMHG | SYSTOLIC BLOOD PRESSURE: 120 MMHG | HEIGHT: 74 IN | HEART RATE: 51 BPM | WEIGHT: 246 LBS

## 2024-10-23 DIAGNOSIS — I48.0 PAROXYSMAL ATRIAL FIBRILLATION: Primary | Chronic | ICD-10-CM

## 2024-10-23 PROCEDURE — 3074F SYST BP LT 130 MM HG: CPT | Performed by: INTERNAL MEDICINE

## 2024-10-23 PROCEDURE — 93000 ELECTROCARDIOGRAM COMPLETE: CPT | Performed by: INTERNAL MEDICINE

## 2024-10-23 PROCEDURE — 3078F DIAST BP <80 MM HG: CPT | Performed by: INTERNAL MEDICINE

## 2024-10-23 PROCEDURE — 99214 OFFICE O/P EST MOD 30 MIN: CPT | Performed by: INTERNAL MEDICINE

## 2024-10-23 NOTE — PROGRESS NOTES
Date of Office Visit: 10/23/2024  Encounter Provider: Nolan Zamudio MD  Place of Service: [facility] CARDIOLOGY  Patient Name: Gary Grullon  : 1945    Subjective:     Encounter Date:10/23/2024      Patient ID: Gary Grullon is a 79 y.o. male who has a cc of  PAF and I did PVI in . No aF since.         The patient had a good year.   No anginal chest pain,   No sig brown,   No soa,   No fainting,  No orthostasis.   No edema.   Exercise tolerance: good.     There have been no hospital admission since the last visit.     There have been no bleeding events.       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    HEDY (obstructive sleep apnea)     AHI 15/h supine 49/h    PMR (polymyalgia rheumatica) 2020       Social History     Socioeconomic History    Marital status:    Tobacco Use    Smoking status: Never    Smokeless tobacco: Never    Tobacco comments:     Caffeine use    Vaping Use    Vaping status: Never Used   Substance and Sexual Activity    Alcohol use: Yes     Comment: 2 glasses of wine daily    Drug use: No    Sexual activity: Defer       Family History   Problem Relation Age of Onset    COPD Mother     Hypertension Father     Diabetes Father     Stroke Father     Coronary artery disease Father     Kidney disease Sister     Diabetes Brother     Other Brother         coronary artery calcification    No Known Problems Maternal Grandmother     No Known Problems Maternal Grandfather     No Known Problems Paternal Grandmother     No Known Problems Paternal Grandfather     No Known Problems Maternal Aunt     No Known Problems Maternal Uncle     No Known Problems Paternal Aunt     No Known Problems Paternal Uncle     Anesthesia problems  "Neg Hx     Broken bones Neg Hx     Cancer Neg Hx     Clotting disorder Neg Hx     Collagen disease Neg Hx     Dislocations Neg Hx     Osteoporosis Neg Hx     Rheumatologic disease Neg Hx     Scoliosis Neg Hx     Severe sprains Neg Hx     Malig Hyperthermia Neg Hx        Review of Systems   Constitutional: Negative for fever and night sweats.   HENT:  Negative for ear pain and stridor.    Eyes:  Negative for discharge and visual halos.   Cardiovascular:  Negative for cyanosis.   Respiratory:  Negative for hemoptysis and sputum production.    Hematologic/Lymphatic: Negative for adenopathy.   Skin:  Negative for nail changes and unusual hair distribution.   Musculoskeletal:  Positive for arthritis and joint pain. Negative for gout and joint swelling.   Gastrointestinal:  Negative for bowel incontinence and flatus.   Genitourinary:  Negative for dysuria and flank pain.   Neurological:  Negative for seizures and tremors.   Psychiatric/Behavioral:  Negative for altered mental status. The patient is not nervous/anxious.             Objective:     Vitals:    10/23/24 0842   BP: 120/78   BP Location: Right arm   Patient Position: Sitting   Pulse: 51   Weight: 112 kg (246 lb)   Height: 188 cm (74\")         Eyes:      General:         Right eye: No discharge.         Left eye: No discharge.   HENT:      Head: Normocephalic and atraumatic.   Neck:      Thyroid: No thyromegaly.      Vascular: No JVD.   Pulmonary:      Effort: Pulmonary effort is normal.      Breath sounds: Normal breath sounds. No rales.   Cardiovascular:      Normal rate. Regular rhythm.      No gallop.    Edema:     Peripheral edema absent.   Abdominal:      General: Bowel sounds are normal.      Palpations: Abdomen is soft.      Tenderness: There is no abdominal tenderness.   Musculoskeletal: Normal range of motion.         General: No deformity. Skin:     General: Skin is warm and dry.      Findings: No erythema.   Neurological:      Mental Status: Alert and " oriented to person, place, and time.      Motor: Normal muscle tone.   Psychiatric:         Behavior: Behavior normal.         Thought Content: Thought content normal.           ECG 12 Lead    Date/Time: 10/23/2024 9:30 AM  Performed by: Nolan Zamudio MD    Authorized by: Nolan Zamudio MD  Comparison: compared with previous ECG   Similar to previous ECG  Rhythm: sinus rhythm  Rate: normal  Conduction: conduction normal  ST Segments: ST segments normal  T Waves: T waves normal  QRS axis: normal    Clinical impression: normal ECG          Lab Review:       Assessment:          Diagnosis Plan   1. Paroxysmal atrial fibrillation               Plan:       I think we stay on the same meds now and when he comes back see him and if no AF wean his meds.     Great results.

## 2024-12-03 ENCOUNTER — LAB (OUTPATIENT)
Facility: HOSPITAL | Age: 79
End: 2024-12-03
Payer: MEDICARE

## 2024-12-03 ENCOUNTER — TRANSCRIBE ORDERS (OUTPATIENT)
Dept: ADMINISTRATIVE | Facility: HOSPITAL | Age: 79
End: 2024-12-03
Payer: MEDICARE

## 2024-12-03 DIAGNOSIS — M10.079 IDIOPATHIC GOUT OF FOOT, UNSPECIFIED CHRONICITY, UNSPECIFIED LATERALITY: ICD-10-CM

## 2024-12-03 DIAGNOSIS — M10.079 IDIOPATHIC GOUT OF FOOT, UNSPECIFIED CHRONICITY, UNSPECIFIED LATERALITY: Primary | ICD-10-CM

## 2024-12-03 LAB — URATE SERPL-MCNC: 6 MG/DL (ref 3.4–7)

## 2024-12-03 PROCEDURE — 84550 ASSAY OF BLOOD/URIC ACID: CPT

## 2024-12-03 PROCEDURE — 36415 COLL VENOUS BLD VENIPUNCTURE: CPT

## 2024-12-16 ENCOUNTER — HOSPITAL ENCOUNTER (OUTPATIENT)
Dept: ULTRASOUND IMAGING | Facility: HOSPITAL | Age: 79
Discharge: HOME | End: 2024-12-16
Admitting: INTERNAL MEDICINE
Payer: MEDICARE

## 2024-12-16 DIAGNOSIS — M79.89 CALF SWELLING: Primary | ICD-10-CM

## 2024-12-16 DIAGNOSIS — M79.89 CALF SWELLING: ICD-10-CM

## 2024-12-16 PROCEDURE — 93971 EXTREMITY STUDY: CPT

## 2025-01-09 ENCOUNTER — OFFICE VISIT (OUTPATIENT)
Dept: SLEEP MEDICINE | Facility: HOSPITAL | Age: 80
End: 2025-01-09
Payer: MEDICARE

## 2025-01-09 VITALS
OXYGEN SATURATION: 98 % | HEIGHT: 74 IN | WEIGHT: 252 LBS | SYSTOLIC BLOOD PRESSURE: 131 MMHG | DIASTOLIC BLOOD PRESSURE: 78 MMHG | HEART RATE: 54 BPM | BODY MASS INDEX: 32.34 KG/M2

## 2025-01-09 DIAGNOSIS — I48.0 PAROXYSMAL ATRIAL FIBRILLATION: Chronic | ICD-10-CM

## 2025-01-09 DIAGNOSIS — E66.811 CLASS 1 OBESITY DUE TO EXCESS CALORIES WITHOUT SERIOUS COMORBIDITY WITH BODY MASS INDEX (BMI) OF 31.0 TO 31.9 IN ADULT: ICD-10-CM

## 2025-01-09 DIAGNOSIS — I10 ESSENTIAL HYPERTENSION: Chronic | ICD-10-CM

## 2025-01-09 DIAGNOSIS — G47.33 OSA ON CPAP: Primary | ICD-10-CM

## 2025-01-09 DIAGNOSIS — E66.09 CLASS 1 OBESITY DUE TO EXCESS CALORIES WITHOUT SERIOUS COMORBIDITY WITH BODY MASS INDEX (BMI) OF 31.0 TO 31.9 IN ADULT: ICD-10-CM

## 2025-01-09 PROCEDURE — 1159F MED LIST DOCD IN RCRD: CPT | Performed by: INTERNAL MEDICINE

## 2025-01-09 PROCEDURE — 1160F RVW MEDS BY RX/DR IN RCRD: CPT | Performed by: INTERNAL MEDICINE

## 2025-01-09 PROCEDURE — 99213 OFFICE O/P EST LOW 20 MIN: CPT | Performed by: INTERNAL MEDICINE

## 2025-01-09 PROCEDURE — G0463 HOSPITAL OUTPT CLINIC VISIT: HCPCS

## 2025-01-09 PROCEDURE — 3075F SYST BP GE 130 - 139MM HG: CPT | Performed by: INTERNAL MEDICINE

## 2025-01-09 PROCEDURE — 3078F DIAST BP <80 MM HG: CPT | Performed by: INTERNAL MEDICINE

## 2025-01-09 NOTE — PROGRESS NOTES
"  [facility]  Sleep Medicine   [address]  [address]  [place] [address]  Phone   Fax       SLEEP CLINIC FOLLOW UP PROGRESS NOTE.    Gary Grullon  [id number]   1945  79 y.o.  male      PCP: Rommel Wilkerson MD      Date of visit: 1/9/2025    Chief Complaint   Patient presents with    Sleep Apnea    Obesity       HPI:  This is a 79 y.o. years old patient is here for the management of obstructive sleep apnea.  Sleep apnea is moderate in severity with a AHI of 15 /hr. Patient is using positive airway pressure therapy with auto CPAP and the symptoms of sleep apnea have improved significantly on the therapy. Normally patient goes to bed at 11 PM and wakes up at 6 AM .  The patient wakes up 1 time(s) during the night and has no problem going back to sleep.  Feels refreshed after waking up.     Medications and allergies are reviewed by me and documented in the encounter.     SOCIAL (habits pertaining to sleep medicine)  History tobacco use:No   History of alcohol use: 5 per week  Caffeine use: 6     REVIEW OF SYSTEMS:   Pertaining positive symptoms are:  Lucien Sleepiness Scale :Total score: 5       PHYSICAL EXAMINATION:  CONSTITUTIONAL:  Vitals:    01/09/25 0846   BP: 131/78   Pulse: 54   SpO2: 98%   Weight: 114 kg (252 lb)   Height: 188 cm (74\")    Body mass index is 32.35 kg/m².   NOSE: nasal passages are clear, No deformities noted   RESP SYSTEM: Not in any respiratory distress, no chest deformities noted,   CARDIOVASULAR: No edema noted  NEURO: Oriented x 3, gait normal,  Mood and affect appeared appropriate      Data reviewed:  The Smart card downloaded on 1/9/2025 has been reviewed independently by me for compliance and discussed the data with the patient.   Compliance; 100%  More than 4 hr use, 93%  Average use of the device 6 hours and 57-minute per night  Residual AHI: 1.2/hr (Optimal < 5/hr, Good <10/hr, Adequate reduce by 75% from baseline)  Mask " type: Nasal pillows  Device: ResMed  DME: [employer]           ASSESSMENT AND PLAN:  Obstructive sleep apnea ( G 47.33).  The symptoms of sleep apnea have improved with the device and the treatment.  Patient's compliance with the device is excellent for treatment of sleep apnea.  I have independently reviewed the smart card down load and discussed with the patient the download data and encouarged the patient to continue to use the device.The residual AHI is acceptable. The device is benefiting the patient and the device is medically necessary.  Without proper control of sleep apnea and good compliance there is a increased risk for hypertension, diabetes mellitus and nonrestorative sleep with hypersomnia which can increase risk for motor vehicle accidents.  Untreated sleep apnea is also a risk factor for development of atrial fibrillation, pulmonary hypertension, insulin resistance and stroke. The patient is also instructed to get the supplies from the DME company and and change them on a regular basis.  A prescription for supplies has been sent to the DME company.  I have also discussed the good sleep hygiene habits and adequate amount of sleep needed for good health.  Obesity  1 with BMI is Body mass index is 32.35 kg/m².. I have discuss the relationship between the weight and sleep apnea. The benefit of weight loss in reducing severity of sleep apnea was discussed. Discussed diet and exercise with the patient to achieve ideal BMI.  Hypertension  Atrial fibrillation.    Return in about 1 year (around 1/9/2026) for with smart card down load. . Patient's questions were answered.    1/9/2025  Justin Phipps MD  Sleep Medicine.  Medical Director,   [facility], [facility] sleep centers.

## 2025-01-24 ENCOUNTER — OFFICE VISIT (OUTPATIENT)
Age: 80
End: 2025-01-24
Payer: MEDICARE

## 2025-01-24 VITALS
BODY MASS INDEX: 32.08 KG/M2 | HEIGHT: 74 IN | HEART RATE: 50 BPM | WEIGHT: 250 LBS | SYSTOLIC BLOOD PRESSURE: 114 MMHG | DIASTOLIC BLOOD PRESSURE: 72 MMHG

## 2025-01-24 DIAGNOSIS — G47.33 OSA ON CPAP: ICD-10-CM

## 2025-01-24 DIAGNOSIS — I10 ESSENTIAL HYPERTENSION: Chronic | ICD-10-CM

## 2025-01-24 DIAGNOSIS — Z98.890 H/O CARDIAC RADIOFREQUENCY ABLATION: ICD-10-CM

## 2025-01-24 DIAGNOSIS — I48.0 PAROXYSMAL ATRIAL FIBRILLATION: Primary | Chronic | ICD-10-CM

## 2025-01-24 PROCEDURE — 1159F MED LIST DOCD IN RCRD: CPT | Performed by: PHYSICIAN ASSISTANT

## 2025-01-24 PROCEDURE — 3074F SYST BP LT 130 MM HG: CPT | Performed by: PHYSICIAN ASSISTANT

## 2025-01-24 PROCEDURE — 3078F DIAST BP <80 MM HG: CPT | Performed by: PHYSICIAN ASSISTANT

## 2025-01-24 PROCEDURE — 99214 OFFICE O/P EST MOD 30 MIN: CPT | Performed by: PHYSICIAN ASSISTANT

## 2025-01-24 PROCEDURE — 93000 ELECTROCARDIOGRAM COMPLETE: CPT | Performed by: PHYSICIAN ASSISTANT

## 2025-01-24 PROCEDURE — 1160F RVW MEDS BY RX/DR IN RCRD: CPT | Performed by: PHYSICIAN ASSISTANT

## 2025-01-24 NOTE — PROGRESS NOTES
ELECTROPHYSIOLOGY   Date of Office Visit: 2025  Patient Name: Gary Grullon  : 1945  Encounter Provider: Palomo Huggins PA-C  Primary Cardiologist: Yash Soliz MD  Electrophysiologist: Dr. Zamudio  CHIEF COMPLAINT / REASON FOR OFFICE VISIT     Follow-up (3 mths) and Atrial Fibrillation (Paroxysmal)  3 month follow up    HISTORY OF PRESENT ILLNESS     This is a 79 y.o. year old male who presents to [facility] CARDIOLOGY for a for a 3 month follow up.     He has a history of PAF first dx in . He had persistent AF start in 2022 while having COVID and he had an unsuccessful cardioversion. He as placed on amio then had another CV 2022 to restore SR.     He started to have eye issues and with nonarteritic anterior ischemic optic neuropathy. His amio was stopped.     He then had reoccurrence of AF and we placed him on sotalol inpatient at 60 mg BID due to bradycardia.     S/p  PVI in 2024.     He has been kept on his sotalol post op and is here today for evaluation of his medical therapy going forward.     He contacted us in December after increasing leg edema after long travel. We recommended compression socks which he wore for the flight. Venous doppler was negative.  He had gone to the [place] had an extensive 1 month trip.    Today the patient reports he has been doing very well over the past few months.  He has not had any recurrent episodes of atrial fibrillation.    He denies any changes in energy levels, dizziness or lightheadedness.  There is been no episodes chest pain or worsening shortness of breath.  He continues to maintain an active lifestyle and is playing tennis 3 times a week and pickleball 1 time a week.    His niece is a cardiology PA out in [place].    Apixaban for AC.     PMHx:  HTN, HL, PAF s/p PVI 2024, SVT, HEDY with CPAP use, CKD stage IIIa, pre-diabetes, obesity, hypothyroidism     PHYSICAL EXAMINATION     Vital Signs:  BP  "114/72 (BP Location: Left arm, Patient Position: Sitting, Cuff Size: Adult)   Pulse 50   Ht 188 cm (74\")   Wt 113 kg (250 lb)   BMI 32.10 kg/m²   Estimated body mass index is 32.1 kg/m² as calculated from the following:    Height as of this encounter: 188 cm (74\").    Weight as of this encounter: 113 kg (250 lb).               Physical Exam  Constitutional:       Appearance: Normal appearance.   HENT:      Head: Normocephalic and atraumatic.   Cardiovascular:      Rate and Rhythm: Normal rate and regular rhythm.      Pulses: Normal pulses.      Heart sounds: Normal heart sounds.   Pulmonary:      Effort: Pulmonary effort is normal.      Breath sounds: Normal breath sounds.   Musculoskeletal:      Right lower leg: No edema.      Left lower leg: No edema.   Skin:     General: Skin is warm and dry.   Neurological:      General: No focal deficit present.      Mental Status: He is alert and oriented to person, place, and time.          Cardiac Testing/Results     Cardiac Testing:   - Echo 6/2024: EF 61-65%     Result Review :  The following data was reviewed by: Palomo Huggins PA-C on 01/24/2025:    Lipid Panel          2/19/2024    14:57   Lipid Panel   Total Cholesterol 138    Triglycerides 88    HDL Cholesterol 49    VLDL Cholesterol 17    LDL Cholesterol  72       Lab Results   Component Value Date     08/13/2024     (L) 08/01/2024    K 4.3 08/13/2024    K 4.5 08/01/2024     08/13/2024     08/01/2024    CO2 24.3 08/13/2024    CO2 21.0 (L) 08/01/2024    BUN 13 08/13/2024    BUN 20 08/01/2024    CREATININE 1.28 (H) 08/13/2024    CREATININE 1.19 08/01/2024    EGFRIFNONA 51 (L) 02/16/2022    EGFRIFNONA 58 (L) 02/01/2022    EGFRIFAFRI 64 12/13/2021    EGFRIFAFRI 64 05/28/2021    GLUCOSE 112 (H) 08/13/2024    GLUCOSE 175 (H) 08/01/2024    CALCIUM 9.1 08/13/2024    CALCIUM 9.6 08/01/2024    ALBUMIN 4.0 08/01/2024    ALBUMIN 4.2 02/19/2024    AST 18 08/01/2024    AST 22 02/19/2024    ALT 16 " 08/01/2024    ALT 20 02/19/2024     Lab Results   Component Value Date    WBC 8.99 06/14/2024    WBC 7.56 06/11/2024    HGB 12.2 (L) 06/14/2024    HGB 15.1 06/11/2024    HCT 37.1 (L) 06/14/2024    HCT 45.7 06/11/2024    MCV 93.2 06/14/2024    MCV 93.6 06/11/2024     06/14/2024     06/11/2024     Lab Results   Component Value Date    PROBNP 1,524.0 02/01/2022     Lab Results   Component Value Date    CKTOTAL 96 01/10/2020    TROPONINT 24 (H) 05/17/2023     Lab Results   Component Value Date    TSH 3.410 09/26/2024    TSH 4.790 (H) 08/13/2024                 ECG 12 Lead    Date/Time: 1/24/2025 10:15 AM  Performed by: Palomo Calabrese PA-C    Authorized by: Palomo Calabrese PA-C  Comparison: compared with previous ECG from 10/23/2024  Rhythm: sinus tachycardia  Rate: normal  BPM: 50  ST Segments: ST segments normal  T Waves: T waves normal  QRS axis: normal  Comments: Qtc 407                ASSESSMENT & PLAN       Diagnoses and all orders for this visit:    1-2. Paroxysmal atrial fibrillation (Primary), s/p PVI ablation 6/2024  No recurrence of atrial fibrillation over the last 6 months post ablation.  He is currently taking sotalol 60 mg daily and will continue to wean him off this by discontinuing this medication today.  Did tell him he could have some tachyphylaxis for the next couple days but hopefully this would resolve quickly.  Would expect his baseline heart rate increased today on EKG is at 50  Continue apixaban 5 mg twice daily, [id number] score of 2  He does have hypothyroidism and his PCP manages his TSH/T4 levels.  3. Essential hypertension  Blood pressure today is really well-controlled.  It is on the lower end.  We are discontinuing his metoprolol if he maintains an active lifestyle that we will continue to use to help manage his blood pressures  He is seeing his PCP in a few weeks to recheck blood pressure at that time  4. HEDY on CPAP  Reports compliance with CPAP  use      Follow Up:  Return in about 6 months (around 7/24/2025) for Dr. Francesca Ramirez.  Patient was given instructions and counseling regarding his condition or for health maintenance advice. Please contact office if worsening symptoms or proceed to ER when appropriate.      Palomo Huggins PA-C  01/24/25  10:14 EST    MEDICATIONS         Discharge Medications            Accurate as of January 24, 2025 10:14 AM. If you have any questions, ask your nurse or doctor.                Continue These Medications        Instructions Start Date   allopurinol 300 MG tablet  Commonly known as: ZYLOPRIM   1 tablet, Daily      atorvastatin 20 MG tablet  Commonly known as: LIPITOR   20 mg, Oral, Daily      cholecalciferol 25 MCG (1000 UT) tablet  Commonly known as: VITAMIN D3   1,000 Units, Daily      Eliquis 5 MG tablet tablet  Generic drug: apixaban   TAKE 1 TABLET EVERY 12 HOURS      levothyroxine 75 MCG tablet  Commonly known as: Synthroid   75 mcg, Oral, Daily      Vevye 0.1 % solution  Generic drug: cycloSPORINE   instill one drop in both eyes 2 times a day                   **Lindsay Disclaimer: This note was dictated using an electronic transcription. The electronic translation of spoken language may permit erroneous, or at times, nonsensical words or phrases to be inadvertently transcribed. Although I have reviewed the note for such errors, some may still exist.

## 2025-02-10 ENCOUNTER — OFFICE VISIT (OUTPATIENT)
Dept: INTERNAL MEDICINE | Facility: CLINIC | Age: 80
End: 2025-02-10
Payer: MEDICARE

## 2025-02-10 VITALS
WEIGHT: 252.6 LBS | HEART RATE: 66 BPM | BODY MASS INDEX: 32.42 KG/M2 | SYSTOLIC BLOOD PRESSURE: 128 MMHG | HEIGHT: 74 IN | DIASTOLIC BLOOD PRESSURE: 84 MMHG

## 2025-02-10 DIAGNOSIS — R73.03 PRE-DIABETES: ICD-10-CM

## 2025-02-10 DIAGNOSIS — D12.6 TUBULOVILLOUS ADENOMA OF COLON: ICD-10-CM

## 2025-02-10 DIAGNOSIS — Z12.5 PROSTATE CANCER SCREENING: ICD-10-CM

## 2025-02-10 DIAGNOSIS — E03.2 HYPOTHYROIDISM DUE TO MEDICATION: Chronic | ICD-10-CM

## 2025-02-10 DIAGNOSIS — E78.00 HYPERCHOLESTEREMIA: Primary | Chronic | ICD-10-CM

## 2025-02-10 DIAGNOSIS — G47.33 OSA ON CPAP: ICD-10-CM

## 2025-02-10 PROCEDURE — 99214 OFFICE O/P EST MOD 30 MIN: CPT | Performed by: INTERNAL MEDICINE

## 2025-02-10 PROCEDURE — 1160F RVW MEDS BY RX/DR IN RCRD: CPT | Performed by: INTERNAL MEDICINE

## 2025-02-10 PROCEDURE — G2211 COMPLEX E/M VISIT ADD ON: HCPCS | Performed by: INTERNAL MEDICINE

## 2025-02-10 PROCEDURE — 1126F AMNT PAIN NOTED NONE PRSNT: CPT | Performed by: INTERNAL MEDICINE

## 2025-02-10 PROCEDURE — 3074F SYST BP LT 130 MM HG: CPT | Performed by: INTERNAL MEDICINE

## 2025-02-10 PROCEDURE — 3079F DIAST BP 80-89 MM HG: CPT | Performed by: INTERNAL MEDICINE

## 2025-02-10 PROCEDURE — 1159F MED LIST DOCD IN RCRD: CPT | Performed by: INTERNAL MEDICINE

## 2025-02-10 NOTE — PROGRESS NOTES
Subjective        Chief Complaint   Patient presents with    Hypothyroidism           Gary Grullon is a 79 y.o. male who presents for    Patient Active Problem List   Diagnosis    Hypercholesteremia    Essential hypertension    HEDY on CPAP    Pre-diabetes    Class 1 obesity due to excess calories without serious comorbidity with body mass index (BMI) of 31.0 to 31.9 in adult    Paroxysmal atrial fibrillation    Stage 3a chronic kidney disease    Tachycardia induced cardiomyopathy    Hypothyroidism due to medication    SVT (supraventricular tachycardia)    H/O cardiac radiofrequency ablation---6/2024 PVI    Orthostatic dizziness       History of Present Illness     He feels good. He had a cscope in October with a tubulovillous adenoma. He saw cards and was taken off of betapace. His BP has been 110-120/70-80. Denies gout, chest pain or dyspnea. He uses his CPAP nightly.    No Known Allergies    Current Outpatient Medications on File Prior to Visit   Medication Sig Dispense Refill    allopurinol (ZYLOPRIM) 300 MG tablet Take 1 tablet by mouth Daily.      atorvastatin (LIPITOR) 20 MG tablet TAKE 1 TABLET DAILY 90 tablet 3    cholecalciferol (VITAMIN D3) 1000 units tablet Take 1 tablet by mouth Daily.      cycloSPORINE (Vevye) 0.1 % solution instill one drop in both eyes 2 times a day      Eliquis 5 MG tablet tablet TAKE 1 TABLET EVERY 12 HOURS 180 tablet 3    levothyroxine (Synthroid) 75 MCG tablet Take 1 tablet by mouth Daily. 90 tablet 1     No current facility-administered medications on file prior to visit.       Past Medical History:   Diagnosis Date    Chronically dry eyes, bilateral 11/16/2023    COVID-19 01/2022    Gout     Gout 2024    left great toe    H/O cardiac radiofrequency ablation---6/2024 PVI 07/17/2024    NAION (non-arteritic anterior ischemic optic neuropathy), 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    HEDY (obstructive sleep apnea)     AHI 15/h supine 49/h    PMR (polymyalgia rheumatica) 03/12/2020    Tubular adenoma  2018    Tubulovillous adenoma 10/2024       Past Surgical History:   Procedure Laterality Date    CARDIAC ELECTROPHYSIOLOGY PROCEDURE N/A 6/13/2024    Procedure: Ablation atrial fibrillation;  Surgeon: Nolan Zamudio MD;  Location: [facility] INVASIVE LOCATION;  Service: Cardiovascular;  Laterality: N/A;    CATARACT EXTRACTION      COLONOSCOPY  04/04/2018    DR Gary Guerrero [place]    COLONOSCOPY N/A 10/3/2024    Procedure: COLONOSCOPY;  Surgeon: Deonte Adhikari MD;  Location: [facility] MAIN OR;  Service: Gastroenterology;  Laterality: N/A;  hemorrhoids, polyps, diverticulosis    CYST REMOVAL  1970    PILONIDAL CYST DRAINAGE      TEMPORAL ARTERY BIOPSY Bilateral 10/11/2022    Procedure: BILATERAL TEMPORAL ARTERY BIOPSY;  Surgeon: José Antonio Ramirez MD;  Location: [facility] MAIN OR;  Service: Ophthalmology;  Laterality: Bilateral;       Family History   Problem Relation Age of Onset    COPD Mother     Hypertension Father     Diabetes Father     Stroke Father     Coronary artery disease Father     Kidney disease Sister     Diabetes Brother     Other Brother         coronary artery calcification    No Known Problems Maternal Grandmother     No Known Problems Maternal Grandfather     No Known Problems Paternal Grandmother     No Known Problems Paternal Grandfather     No Known Problems Maternal Aunt     No Known Problems Maternal Uncle     No Known Problems Paternal Aunt     No Known Problems Paternal Uncle     Anesthesia problems Neg Hx     Broken bones Neg Hx     Cancer Neg Hx     Clotting disorder Neg Hx     Collagen disease Neg Hx     Dislocations Neg Hx     Osteoporosis Neg Hx     Rheumatologic disease Neg Hx     Scoliosis Neg Hx     Severe sprains Neg Hx     Malig Hyperthermia Neg Hx        Social History     Socioeconomic History    Marital status:    Tobacco Use    Smoking status: Never    Smokeless tobacco: Never    Tobacco comments:     Caffeine use    Vaping Use    Vaping status: Never  "Used   Substance and Sexual Activity    Alcohol use: Yes     Comment: 2 glasses of wine daily    Drug use: No    Sexual activity: Defer           The following portions of the patient's history were reviewed and updated as appropriate: problem list, allergies, current medications, past medical history, past family history, past social history, and past surgical history.    Review of Systems    Immunization History   Administered Date(s) Administered    COVID-19 (MODERNA) 12YRS+ (SPIKEVAX) 12/13/2023, 09/02/2024    COVID-19 (MODERNA) 1st,2nd,3rd Dose Monovalent 01/13/2021, 02/10/2021, 08/25/2021    COVID-19 (MODERNA) BIVALENT 12+YRS 10/19/2022    COVID-19 (MODERNA) Monovalent Original Booster 04/16/2022    FLUAD TRI 65YR+ 10/02/2022    Fluad Quad 65+ 09/13/2019, 10/10/2021    Fluzone High-Dose 65+YRS 09/01/2016, 09/06/2018, 09/01/2020, 09/02/2024    Fluzone High-Dose 65+yrs 09/30/2022, 10/05/2023    Hepatitis A 03/21/2006, 10/25/2011    Hepatitis B Adult/Adolescent IM 03/21/2006, 07/08/2007, 02/02/2012    Japanese Encephalitis, Unspecified 10/10/2024, 11/04/2024    MMR 03/13/2008    Meningococcal, Unspecified 01/14/2013    Pneumococcal Conjugate 13-Valent (PCV13) 08/31/2016    Pneumococcal Conjugate 20-Valent (PCV20) 09/30/2022    Pneumococcal Polysaccharide (PPSV23) 12/10/2011    Polio, Unspecified 01/15/2003    RSV, unspecified (Vaccine or MAB) 09/26/2024    Shingrix 08/12/2019, 06/19/2020    Tdap 01/01/2012, 11/02/2019    Typhoid, Unspecified 01/14/2013, 10/09/2024    Yellow Fever 01/14/2013    Zostavax 10/19/2012, 08/12/2019       Objective   Vitals:    02/10/25 0735   BP: 128/84   Pulse: 66   Weight: 115 kg (252 lb 9.6 oz)   Height: 188 cm (74.02\")     Body mass index is 32.42 kg/m².  Physical Exam  Vitals reviewed.   Constitutional:       Appearance: He is well-developed.   HENT:      Head: Normocephalic and atraumatic.   Cardiovascular:      Rate and Rhythm: Normal rate and regular rhythm.      Heart sounds: " Normal heart sounds, S1 normal and S2 normal.   Pulmonary:      Effort: Pulmonary effort is normal.      Breath sounds: Normal breath sounds.   Skin:     General: Skin is warm.   Neurological:      Mental Status: He is alert.   Psychiatric:         Behavior: Behavior normal.         Procedures    Assessment & Plan   Diagnoses and all orders for this visit:    1. Hypercholesteremia (Primary)  -     Comprehensive Metabolic Panel; Future  -     Lipid Panel With / Chol / HDL Ratio; Future    2. Pre-diabetes  -     Hemoglobin A1c; Future    3. Hypothyroidism due to medication  -     TSH; Future  -     T4, Free; Future    4. HEDY on CPAP  Comments:  uses CPAP nightly    5. Prostate cancer screening  -     PSA Screen; Future    6. Tubulovillous adenoma of colon                 To have repeat cscope this October given tubulovillous adenoma and number of polyps. Reviewed tsh, ft4, cmp and A1c. He is feeling good.  Return in about 6 months (around 8/21/2025) for Medicare Wellness, Lab Before FUP.

## 2025-03-04 ENCOUNTER — LAB (OUTPATIENT)
Facility: HOSPITAL | Age: 80
End: 2025-03-04
Payer: MEDICARE

## 2025-03-04 ENCOUNTER — TRANSCRIBE ORDERS (OUTPATIENT)
Facility: HOSPITAL | Age: 80
End: 2025-03-04
Payer: MEDICARE

## 2025-03-04 DIAGNOSIS — M10.072 IDIOPATHIC GOUT, LEFT ANKLE AND FOOT: ICD-10-CM

## 2025-03-04 DIAGNOSIS — M10.072 IDIOPATHIC GOUT, LEFT ANKLE AND FOOT: Primary | ICD-10-CM

## 2025-03-04 LAB — URATE SERPL-MCNC: 5 MG/DL (ref 3.4–7)

## 2025-03-04 PROCEDURE — 84550 ASSAY OF BLOOD/URIC ACID: CPT

## 2025-03-04 PROCEDURE — 36415 COLL VENOUS BLD VENIPUNCTURE: CPT

## 2025-03-19 DIAGNOSIS — E03.2 HYPOTHYROIDISM DUE TO MEDICATION: Chronic | ICD-10-CM

## 2025-03-19 RX ORDER — LEVOTHYROXINE SODIUM 75 UG/1
75 TABLET ORAL DAILY
Qty: 90 TABLET | Refills: 3 | Status: SHIPPED | OUTPATIENT
Start: 2025-03-19

## 2025-04-16 ENCOUNTER — TRANSCRIBE ORDERS (OUTPATIENT)
Dept: ADMINISTRATIVE | Facility: HOSPITAL | Age: 80
End: 2025-04-16
Payer: MEDICARE

## 2025-04-16 ENCOUNTER — LAB (OUTPATIENT)
Facility: HOSPITAL | Age: 80
End: 2025-04-16
Payer: MEDICARE

## 2025-04-16 DIAGNOSIS — M10.071 IDIOPATHIC GOUT, RIGHT ANKLE AND FOOT: ICD-10-CM

## 2025-04-16 DIAGNOSIS — M10.079 ACUTE IDIOPATHIC GOUT OF FOOT, UNSPECIFIED LATERALITY: ICD-10-CM

## 2025-04-16 DIAGNOSIS — M10.071 IDIOPATHIC GOUT, RIGHT ANKLE AND FOOT: Primary | ICD-10-CM

## 2025-04-16 LAB — URATE SERPL-MCNC: 5.1 MG/DL (ref 3.4–7)

## 2025-04-16 PROCEDURE — 84550 ASSAY OF BLOOD/URIC ACID: CPT

## 2025-04-16 PROCEDURE — 36415 COLL VENOUS BLD VENIPUNCTURE: CPT

## 2025-07-30 ENCOUNTER — OFFICE VISIT (OUTPATIENT)
Age: 80
End: 2025-07-30
Payer: MEDICARE

## 2025-07-30 VITALS
HEIGHT: 74 IN | DIASTOLIC BLOOD PRESSURE: 70 MMHG | HEART RATE: 61 BPM | WEIGHT: 245 LBS | SYSTOLIC BLOOD PRESSURE: 126 MMHG | BODY MASS INDEX: 31.44 KG/M2

## 2025-07-30 DIAGNOSIS — I48.0 PAROXYSMAL ATRIAL FIBRILLATION: Chronic | ICD-10-CM

## 2025-07-30 DIAGNOSIS — Z98.890 H/O CARDIAC RADIOFREQUENCY ABLATION: Primary | ICD-10-CM

## 2025-07-30 NOTE — PROGRESS NOTES
Date of Office Visit: 2025  Encounter Provider: Nolan Zamudio MD  Place of Service: [facility] CARDIOLOGY  Patient Name: Gary Grullon  : 1945    Subjective:     Encounter Date:2025      Patient ID: Gary Grullon is a 79 y.o. male who has a cc of  PAF and I did PVI in 2024. He has done well. But may have had an episode of AF recently after tennis on a super hot day.     Otherwise he is doing well.     The patient had a good year.   No anginal chest pain,   No sig brown,   No soa,   No fainting,  No orthostasis.   No edema.   Exercise tolerance: great     There have been no hospital admission since the last visit.     There have been no bleeding events.       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    HEDY (obstructive sleep apnea)     AHI 15/h supine 49/h    PMR (polymyalgia rheumatica) 2020    Tubular adenoma 2018    Tubulovillous adenoma 10/2024       Social History     Socioeconomic History    Marital status:    Tobacco Use    Smoking status: Never     Passive exposure: Never    Smokeless tobacco: Never    Tobacco comments:     Caffeine use    Vaping Use    Vaping status: Never Used   Substance and Sexual Activity    Alcohol use: Yes     Comment: 2 glasses of wine daily    Drug use: No    Sexual activity: Defer       Family History   Problem Relation Age of Onset    COPD Mother     Hypertension Father     Diabetes Father     Stroke Father     Coronary artery disease Father     Kidney disease Sister     Diabetes Brother     Other Brother         coronary artery calcification    No Known Problems Maternal Grandmother     No Known Problems Maternal Grandfather     No Known Problems Paternal Grandmother     No Known  "Problems Paternal Grandfather     No Known Problems Maternal Aunt     No Known Problems Maternal Uncle     No Known Problems Paternal Aunt     No Known Problems Paternal Uncle     Anesthesia problems Neg Hx     Broken bones Neg Hx     Cancer Neg Hx     Clotting disorder Neg Hx     Collagen disease Neg Hx     Dislocations Neg Hx     Osteoporosis Neg Hx     Rheumatologic disease Neg Hx     Scoliosis Neg Hx     Severe sprains Neg Hx     Malig Hyperthermia Neg Hx        Review of Systems   Constitutional: Negative for fever and night sweats.   HENT:  Negative for ear pain and stridor.    Eyes:  Negative for discharge and visual halos.   Cardiovascular:  Negative for cyanosis.   Respiratory:  Negative for hemoptysis and sputum production.    Hematologic/Lymphatic: Negative for adenopathy.   Skin:  Negative for nail changes and unusual hair distribution.   Musculoskeletal:  Negative for gout and joint swelling.   Gastrointestinal:  Negative for bowel incontinence and flatus.   Genitourinary:  Negative for dysuria and flank pain.   Neurological:  Negative for seizures and tremors.   Psychiatric/Behavioral:  Negative for altered mental status. The patient is not nervous/anxious.             Objective:     Vitals:    07/30/25 0859   BP: 126/70   BP Location: Left arm   Patient Position: Sitting   Cuff Size: Adult   Pulse: 61   Weight: 111 kg (245 lb)   Height: 188 cm (74.02\")         Eyes:      General:         Right eye: No discharge.         Left eye: No discharge.   HENT:      Head: Normocephalic and atraumatic.   Neck:      Thyroid: No thyromegaly.      Vascular: No JVD.   Pulmonary:      Effort: Pulmonary effort is normal.      Breath sounds: Normal breath sounds. No rales.   Cardiovascular:      Normal rate. Regular rhythm.      No gallop.    Edema:     Peripheral edema absent.   Abdominal:      General: Bowel sounds are normal.      Palpations: Abdomen is soft.      Tenderness: There is no abdominal tenderness. "   Musculoskeletal: Normal range of motion.         General: No deformity. Skin:     General: Skin is warm and dry.      Findings: No erythema.   Neurological:      Mental Status: Alert and oriented to person, place, and time.      Motor: Normal muscle tone.   Psychiatric:         Behavior: Behavior normal.         Thought Content: Thought content normal.           ECG 12 Lead    Date/Time: 7/30/2025 9:48 AM  Performed by: Nolan Zamudio MD    Authorized by: Nolan Zamudio MD  Comparison: compared with previous ECG   Similar to previous ECG  Rhythm: sinus rhythm  Ectopy: atrial premature contractions          Lab Review:       Assessment:          Diagnosis Plan   1. H/O cardiac radiofrequency ablation---6/2024 PVI        2. Paroxysmal atrial fibrillation               Plan:     He's only had one episode in a year.     I think we watch him      He was symptomatic but not bad. And he is on a-ban so he is protected.

## 2025-08-22 ENCOUNTER — OFFICE VISIT (OUTPATIENT)
Dept: INTERNAL MEDICINE | Facility: CLINIC | Age: 80
End: 2025-08-22
Payer: MEDICARE

## 2025-08-22 VITALS
BODY MASS INDEX: 30.9 KG/M2 | DIASTOLIC BLOOD PRESSURE: 82 MMHG | HEIGHT: 74 IN | SYSTOLIC BLOOD PRESSURE: 134 MMHG | WEIGHT: 240.8 LBS

## 2025-08-22 DIAGNOSIS — G47.33 OSA ON CPAP: ICD-10-CM

## 2025-08-22 DIAGNOSIS — R73.03 PRE-DIABETES: ICD-10-CM

## 2025-08-22 DIAGNOSIS — I10 ESSENTIAL HYPERTENSION: Chronic | ICD-10-CM

## 2025-08-22 DIAGNOSIS — E03.2 HYPOTHYROIDISM DUE TO MEDICATION: Chronic | ICD-10-CM

## 2025-08-22 DIAGNOSIS — D36.9 TUBULOVILLOUS ADENOMA: ICD-10-CM

## 2025-08-22 DIAGNOSIS — Z00.00 ENCOUNTER FOR SUBSEQUENT ANNUAL WELLNESS VISIT (AWV) IN MEDICARE PATIENT: Primary | ICD-10-CM

## 2025-08-22 DIAGNOSIS — N52.9 ERECTILE DYSFUNCTION, UNSPECIFIED ERECTILE DYSFUNCTION TYPE: ICD-10-CM

## 2025-08-22 DIAGNOSIS — E78.00 HYPERCHOLESTEREMIA: Chronic | ICD-10-CM

## 2025-08-22 DIAGNOSIS — N18.31 STAGE 3A CHRONIC KIDNEY DISEASE: ICD-10-CM

## 2025-08-22 RX ORDER — PERFLUOROHEXYLOCTANE 1 MG/MG
1 SOLUTION OPHTHALMIC EVERY 6 HOURS
COMMUNITY
Start: 2025-08-11

## 2025-08-22 RX ORDER — SILDENAFIL CITRATE 20 MG/1
TABLET ORAL
Qty: 90 TABLET | Refills: 1 | Status: SHIPPED | OUTPATIENT
Start: 2025-08-22

## (undated) DEVICE — ELECTRD BLD EZ CLN MOD 2.5IN

## (undated) DEVICE — PREF.GUIDING SHEATH W/MULT.CRV: Brand: PREFACE

## (undated) DEVICE — STRIP,CLOSURE,WOUND,MEDI-STRIP,1/2X4: Brand: MEDLINE

## (undated) DEVICE — PK ENT 40

## (undated) DEVICE — BI-DIRECTIONAL NAVIGATION CATHETER, NAV, D-F: Brand: QDOT MICRO

## (undated) DEVICE — MARKR SKIN W/RULR AND LBL

## (undated) DEVICE — CLEARSIGHT FINGER CUFF MEDIUM MULTI PACK: Brand: CLEARSIGHT

## (undated) DEVICE — SINGLE-USE BIOPSY FORCEPS: Brand: RADIAL JAW 4

## (undated) DEVICE — PINNACLE INTRODUCER SHEATH: Brand: PINNACLE

## (undated) DEVICE — LOU EP: Brand: MEDLINE INDUSTRIES, INC.

## (undated) DEVICE — GLV SURG BIOGEL SENSR LTX PF SZ7.5

## (undated) DEVICE — SWABSTK SKINPREP PVPI LF PK/3

## (undated) DEVICE — OCTA,PERSEID,2-2-2-2-2,F-CURVE: Brand: OCTARAY MAPPING CATHETER

## (undated) DEVICE — SYS TRNSEP ACC ACROSS ADLT BRK1 71CM

## (undated) DEVICE — GOWN ,SIRUS,NONREINFORCED 3XL: Brand: MEDLINE

## (undated) DEVICE — PATIENT RETURN ELECTRODE, SINGLE-USE, CONTACT QUALITY MONITORING, ADULT, WITH 9FT CORD, FOR PATIENTS WEIGING OVER 33LBS. (15KG): Brand: MEGADYNE

## (undated) DEVICE — SNAR POLYP CAPTIVATOR/COLD STFF RND 10MM 240CM

## (undated) DEVICE — GOWN ISOL W/THUMB UNIV BLU BX/15

## (undated) DEVICE — ADAPT CLN SCPE ENDO PORPOISE BX/50 DISP

## (undated) DEVICE — Device: Brand: WEBSTER CS

## (undated) DEVICE — TBG PENCL TELESCP MEGADYNE SMOKE EVAC 10FT

## (undated) DEVICE — Device: Brand: SMARTABLATE

## (undated) DEVICE — Device: Brand: REFERENCE PATCH CARTO 3

## (undated) DEVICE — FLEX ADVANTAGE 1500CC: Brand: FLEX ADVANTAGE

## (undated) DEVICE — SUT GUT PLN FAST ABS 5/0 PC1 18IN 1915G

## (undated) DEVICE — TRAP FLD MINIVAC MEGADYNE 100ML

## (undated) DEVICE — CANN O2 ETCO2 FITS ALL CONN CO2 SMPL A/ 7IN DISP LF

## (undated) DEVICE — THE SINGLE USE ETRAP – POLYP TRAP IS USED FOR SUCTION RETRIEVAL OF ENDOSCOPICALLY REMOVED POLYPS.: Brand: ETRAP

## (undated) DEVICE — PROXIMATE RH ROTATING HEAD SKIN STAPLERS (35 WIDE) CONTAINS 35 STAINLESS STEEL STAPLES: Brand: PROXIMATE

## (undated) DEVICE — SUT SILK 4/0 TIES 18IN A183H

## (undated) DEVICE — SYRINGE, LUER SLIP, STERILE, 60ML: Brand: MEDLINE

## (undated) DEVICE — Device

## (undated) DEVICE — SUT VIC 5/0 P3 18IN J493G

## (undated) DEVICE — SOUNDSTAR ECO 8F G CATHETER: Brand: SOUNDSTAR

## (undated) DEVICE — KT ORCA ORCAPOD DISP STRL

## (undated) DEVICE — NDL HYPO PRECISIONGLIDE/REG 30G 1/2 BRN

## (undated) DEVICE — NDL HYPO PRECISIONGLIDE REG 25G 1 1/2

## (undated) DEVICE — PROB S-CATH TEMP ESOPH 10F

## (undated) DEVICE — INTENDED FOR TISSUE SEPARATION, AND OTHER PROCEDURES THAT REQUIRE A SHARP SURGICAL BLADE TO PUNCTURE OR CUT.: Brand: BARD-PARKER ® CARBON RIB-BACK BLADES